# Patient Record
Sex: FEMALE | Race: WHITE | Employment: OTHER | URBAN - METROPOLITAN AREA
[De-identification: names, ages, dates, MRNs, and addresses within clinical notes are randomized per-mention and may not be internally consistent; named-entity substitution may affect disease eponyms.]

---

## 2020-09-04 ENCOUNTER — HOSPITAL ENCOUNTER (OUTPATIENT)
Dept: PREADMISSION TESTING | Age: 70
Discharge: HOME | End: 2020-09-04
Payer: MEDICARE

## 2020-09-04 PROCEDURE — 87635 SARS-COV-2 COVID-19 AMP PRB: CPT

## 2020-09-05 LAB
HEALTH STATUS, XMCV2T: NORMAL
SARS-COV-2, COV2NT: NOT DETECTED
SOURCE, COVRS: NORMAL
SPECIMEN SOURCE, FCOV2M: NORMAL
SPECIMEN TYPE, XMCV1T: NORMAL

## 2020-09-08 ENCOUNTER — HOSPITAL ENCOUNTER (OUTPATIENT)
Age: 70
Setting detail: OUTPATIENT SURGERY
Discharge: HOME | End: 2020-09-08
Attending: COLON & RECTAL SURGERY | Admitting: COLON & RECTAL SURGERY
Payer: MEDICARE

## 2020-09-08 ENCOUNTER — ANESTHESIA (OUTPATIENT)
Dept: ENDOSCOPY | Age: 70
End: 2020-09-08
Payer: MEDICARE

## 2020-09-08 ENCOUNTER — ANESTHESIA EVENT (OUTPATIENT)
Dept: ENDOSCOPY | Age: 70
End: 2020-09-08
Payer: MEDICARE

## 2020-09-08 VITALS
DIASTOLIC BLOOD PRESSURE: 41 MMHG | HEIGHT: 65 IN | OXYGEN SATURATION: 98 % | BODY MASS INDEX: 24.16 KG/M2 | WEIGHT: 145 LBS | TEMPERATURE: 97.9 F | RESPIRATION RATE: 16 BRPM | SYSTOLIC BLOOD PRESSURE: 129 MMHG | HEART RATE: 68 BPM

## 2020-09-08 PROCEDURE — 76060000031 HC ANESTHESIA FIRST 0.5 HR: Performed by: COLON & RECTAL SURGERY

## 2020-09-08 PROCEDURE — 74011250636 HC RX REV CODE- 250/636: Performed by: NURSE ANESTHETIST, CERTIFIED REGISTERED

## 2020-09-08 PROCEDURE — 74011250637 HC RX REV CODE- 250/637: Performed by: COLON & RECTAL SURGERY

## 2020-09-08 PROCEDURE — 76040000019: Performed by: COLON & RECTAL SURGERY

## 2020-09-08 PROCEDURE — 74011250636 HC RX REV CODE- 250/636: Performed by: COLON & RECTAL SURGERY

## 2020-09-08 RX ORDER — FLUMAZENIL 0.1 MG/ML
0.2 INJECTION INTRAVENOUS
Status: DISCONTINUED | OUTPATIENT
Start: 2020-09-08 | End: 2020-09-08 | Stop reason: HOSPADM

## 2020-09-08 RX ORDER — PROPOFOL 10 MG/ML
INJECTION, EMULSION INTRAVENOUS AS NEEDED
Status: DISCONTINUED | OUTPATIENT
Start: 2020-09-08 | End: 2020-09-08 | Stop reason: HOSPADM

## 2020-09-08 RX ORDER — EPINEPHRINE 0.1 MG/ML
1 INJECTION INTRACARDIAC; INTRAVENOUS
Status: DISCONTINUED | OUTPATIENT
Start: 2020-09-08 | End: 2020-09-08 | Stop reason: HOSPADM

## 2020-09-08 RX ORDER — SODIUM CHLORIDE 9 MG/ML
50 INJECTION, SOLUTION INTRAVENOUS CONTINUOUS
Status: DISCONTINUED | OUTPATIENT
Start: 2020-09-08 | End: 2020-09-08 | Stop reason: HOSPADM

## 2020-09-08 RX ORDER — SODIUM CHLORIDE 9 MG/ML
25 INJECTION, SOLUTION INTRAVENOUS CONTINUOUS
Status: DISCONTINUED | OUTPATIENT
Start: 2020-09-08 | End: 2020-09-08 | Stop reason: HOSPADM

## 2020-09-08 RX ORDER — ATROPINE SULFATE 0.1 MG/ML
0.5 INJECTION INTRAVENOUS
Status: DISCONTINUED | OUTPATIENT
Start: 2020-09-08 | End: 2020-09-08 | Stop reason: HOSPADM

## 2020-09-08 RX ORDER — DEXTROMETHORPHAN/PSEUDOEPHED 2.5-7.5/.8
1.2 DROPS ORAL
Status: DISCONTINUED | OUTPATIENT
Start: 2020-09-08 | End: 2020-09-08 | Stop reason: HOSPADM

## 2020-09-08 RX ORDER — NALOXONE HYDROCHLORIDE 0.4 MG/ML
0.4 INJECTION, SOLUTION INTRAMUSCULAR; INTRAVENOUS; SUBCUTANEOUS
Status: DISCONTINUED | OUTPATIENT
Start: 2020-09-08 | End: 2020-09-08 | Stop reason: HOSPADM

## 2020-09-08 RX ORDER — SODIUM CHLORIDE 0.9 % (FLUSH) 0.9 %
5-40 SYRINGE (ML) INJECTION AS NEEDED
Status: DISCONTINUED | OUTPATIENT
Start: 2020-09-08 | End: 2020-09-08 | Stop reason: HOSPADM

## 2020-09-08 RX ORDER — SODIUM CHLORIDE 0.9 % (FLUSH) 0.9 %
5-40 SYRINGE (ML) INJECTION EVERY 8 HOURS
Status: DISCONTINUED | OUTPATIENT
Start: 2020-09-08 | End: 2020-09-08 | Stop reason: HOSPADM

## 2020-09-08 RX ADMIN — SODIUM CHLORIDE 25 ML/HR: 900 INJECTION, SOLUTION INTRAVENOUS at 11:33

## 2020-09-08 RX ADMIN — SIMETHICONE 80 MG: 20 SUSPENSION/ DROPS ORAL at 12:33

## 2020-09-08 RX ADMIN — PROPOFOL 200 MG: 10 INJECTION, EMULSION INTRAVENOUS at 12:39

## 2020-09-08 RX ADMIN — SODIUM CHLORIDE 50 ML/HR: 900 INJECTION, SOLUTION INTRAVENOUS at 12:34

## 2020-09-08 NOTE — OP NOTES
Colonoscopy Procedure Note    Indications: Previous adenomatous polyp, guaiac + stool    Anesthesia/Sedation: MAC anesthesia Propofol    Pre-Procedure Exam:  Airway: clear   Heart: normal S1and S2    Lungs: clear bilateral  Abdomen: soft, nontender, bowel sounds present and normal in all quadrants   Mental Status: awake, alert, and oriented to person, place, and time      Procedure in Detail:  Informed consent was obtained for the procedure, including sedation. Risks of perforation, hemorrhage, adverse drug reaction, and aspiration were discussed. The patient was placed in the left lateral decubitus position. Based on the pre-procedure assessment, including review of the patient's medical history, medications, allergies, and review of systems, she had been deemed to be an appropriate candidate for moderate sedation; she was therefore sedated with the medications listed above. The patient was monitored continuously with ECG tracing, pulse oximetry, blood pressure monitoring, and direct observations. A rectal examination was performed. The [id number] was inserted into the rectum and advanced under direct vision to the cecum, which was identified by the ileocecal valve and appendiceal orifice. The quality of the colonic preparation was excellent. A careful inspection was made as the colonoscope was withdrawn, including a retroflexed view of the rectum; findings and interventions are described below. Appropriate photodocumentation was obtained. Findings:   Rectum:     - Protruding lesions:     -Internal Hemorrhoids  Sigmoid:     - normal colorectal anastomosis  Descending Colon:   Normal  Transverse Colon:   Normal  Ascending Colon:   Normal  Cecum:   Normal          Specimens: No specimens were collected. EBL: None    Complications: None; patient tolerated the procedure well. Attending Attestation: I performed the procedure. Recommendations:   - Repeat colonoscopy in 5 years.

## 2020-09-08 NOTE — PERIOP NOTES
Sabiha Gomez  1950  [id number]    Situation:  Verbal report received from: Karla Pinon RN  Procedure: Procedure(s):  COLONOSCOPY    Background:    Preoperative diagnosis: BLOOD IN STOOLS  Postoperative diagnosis: DIVERTICULOSIS, HEMORRHOIDS    :  Dr. Suárez Melvin  Assistant(s): Endoscopy Technician-1: Jennifer Campo  Endoscopy RN-1: Ruben Denis RN    Specimens: * No specimens in log *  H. Pylori  no    Assessment:  Intra-procedure medications   Anesthesia gave intra-procedure sedation and medications, see anesthesia flow sheet yes    Intravenous fluids: NS@ KVO     Vital signs stable   Abdominal assessment: round and soft     Recommendation:  Discharge patient per MD order.   Family or Friend   Permission to share finding with family or friend yes

## 2020-09-08 NOTE — DISCHARGE INSTRUCTIONS
Lisbet Hansen  [id number]  1950    COLON DISCHARGE INSTRUCTIONS  Discomfort:  Redness at IV site- apply warm compress to area; if redness or soreness persist- contact your physician  There may be a slight amount of blood passed from the rectum  Gaseous discomfort- walking, belching will help relieve any discomfort  You may not operate a vehicle for 12 hours  You may not engage in an occupation involving machinery or appliances for rest of today  You may not drink alcoholic beverages for at least 12 hours  Avoid making any critical decisions for at least 24 hour  DIET:   High fiber diet. - however -  remember your colon is empty and a heavy meal will produce gas. Avoid these foods:  vegetables, fried / greasy foods, carbonated drinks for today    MEDICATIONS:  resume       ACTIVITY:  You may resume your normal daily activities it is recommended that you spend the remainder of the day resting -  avoid any strenuous activity. CALL M.D. ANY SIGN OF:   Increasing pain, nausea, vomiting  Abdominal distension (swelling)  New increased bleeding (oral or rectal)  Fever (chills)  Pain in chest area  Bloody discharge from nose or mouth  Shortness of breath     Follow-up Instructions:   Call Rochelle Colorado MD if any questions or problems. Telephone # [phone]  Biopsy results will be available in  7 to10 days  Should have a repeat colonoscopy in 5 years. COLONOSCOPY FINDINGS:  Your colonoscopy showed: hemorrhoids, sigmoid diverticulosis.

## 2020-09-08 NOTE — ANESTHESIA PREPROCEDURE EVALUATION
Relevant Problems   No relevant active problems       Anesthetic History     PONV          Review of Systems / Medical History  Patient summary reviewed and pertinent labs reviewed    Pulmonary  Within defined limits                 Neuro/Psych   Within defined limits           Cardiovascular    Hypertension              Exercise tolerance: >4 METS     GI/Hepatic/Renal  Within defined limits              Endo/Other        Arthritis     Other Findings   Comments: Blood in stools    H/O polyps            Physical Exam    Airway  Mallampati: II  TM Distance: 4 - 6 cm  Neck ROM: normal range of motion   Mouth opening: Normal     Cardiovascular  Regular rate and rhythm,  S1 and S2 normal,  no murmur, click, rub, or gallop             Dental  No notable dental hx       Pulmonary  Breath sounds clear to auscultation               Abdominal  GI exam deferred       Other Findings            Anesthetic Plan    ASA: 2  Anesthesia type: MAC and total IV anesthesia          Induction: Intravenous  Anesthetic plan and risks discussed with: Patient

## 2020-09-08 NOTE — ANESTHESIA POSTPROCEDURE EVALUATION
Procedure(s):  COLONOSCOPY.    MAC, total IV anesthesia    Anesthesia Post Evaluation        Patient location during evaluation: PACU  Note status: Adequate. Level of consciousness: responsive to verbal stimuli and sleepy but conscious  Pain management: satisfactory to patient  Airway patency: patent  Anesthetic complications: no  Cardiovascular status: acceptable  Respiratory status: acceptable  Hydration status: acceptable  Comments: +Post-Anesthesia Evaluation and Assessment    Patient: Lisebt Hansen MRN: [id number]  SSN: [id number]   YOB: 1950  Age: 69 y.o. Sex: female      Cardiovascular Function/Vital Signs    /41   Pulse 68   Temp 36.6 °C (97.9 °F)   Resp 16   Ht 5' 5\" (1.651 m)   Wt 65.8 kg (145 lb)   SpO2 98%   Breastfeeding No   BMI 24.13 kg/m²     Patient is status post Procedure(s):  COLONOSCOPY. Nausea/Vomiting: Controlled. Postoperative hydration reviewed and adequate. Pain:  Pain Scale 1: Numeric (0 - 10) (09/08/20 1309)  Pain Intensity 1: 0 (09/08/20 1309)   Managed. Neurological Status: At baseline. Mental Status and Level of Consciousness: Arousable. Pulmonary Status:   O2 Device: Room air (09/08/20 1309)   Adequate oxygenation and airway patent. Complications related to anesthesia: None    Post-anesthesia assessment completed. No concerns. Signed By: Fernanda Breen MD    9/8/2020  Post anesthesia nausea and vomiting:  controlled      INITIAL Post-op Vital signs:   Vitals Value Taken Time   /40 9/8/2020  1:09 PM   Temp 36.6 °C (97.9 °F) 9/8/2020 12:49 PM   Pulse 65 9/8/2020  1:09 PM   Resp 15 9/8/2020  1:09 PM   SpO2 100 % 9/8/2020  1:09 PM   Vitals shown include unvalidated device data.

## 2021-08-11 ENCOUNTER — TRANSCRIBE ORDER (OUTPATIENT)
Dept: SCHEDULING | Age: 71
End: 2021-08-11

## 2021-08-11 DIAGNOSIS — R94.4 NONSPECIFIC ABNORMAL RESULTS OF KIDNEY FUNCTION STUDY: Primary | ICD-10-CM

## 2021-08-13 ENCOUNTER — HOSPITAL ENCOUNTER (OUTPATIENT)
Dept: ULTRASOUND IMAGING | Age: 71
Discharge: HOME | End: 2021-08-13
Attending: HOSPITALIST
Payer: MEDICARE

## 2021-08-13 DIAGNOSIS — R94.4 NONSPECIFIC ABNORMAL RESULTS OF KIDNEY FUNCTION STUDY: ICD-10-CM

## 2021-08-13 PROCEDURE — 76770 US EXAM ABDO BACK WALL COMP: CPT

## 2022-02-19 ENCOUNTER — HOSPITAL ENCOUNTER (INPATIENT)
Age: 72
LOS: 4 days | Discharge: HOME | DRG: 242 | End: 2022-02-23
Attending: EMERGENCY MEDICINE | Admitting: INTERNAL MEDICINE
Payer: MEDICARE

## 2022-02-19 ENCOUNTER — APPOINTMENT (OUTPATIENT)
Dept: GENERAL RADIOLOGY | Age: 72
DRG: 242 | End: 2022-02-19
Attending: EMERGENCY MEDICINE
Payer: MEDICARE

## 2022-02-19 DIAGNOSIS — I21.4 NON-STEMI (NON-ST ELEVATED MYOCARDIAL INFARCTION) (HCC): ICD-10-CM

## 2022-02-19 DIAGNOSIS — I21.4 NSTEMI (NON-ST ELEVATED MYOCARDIAL INFARCTION) (HCC): ICD-10-CM

## 2022-02-19 DIAGNOSIS — I45.9 HEART BLOCK: ICD-10-CM

## 2022-02-19 DIAGNOSIS — I44.1 SECOND DEGREE HEART BLOCK: Primary | ICD-10-CM

## 2022-02-19 DIAGNOSIS — I50.9 ACUTE CONGESTIVE HEART FAILURE, UNSPECIFIED HEART FAILURE TYPE (HCC): ICD-10-CM

## 2022-02-19 LAB
ABO + RH BLD: NORMAL
ACT BLD: 232 SECS (ref 79–138)
ACT BLD: 279 SECS (ref 79–138)
ACT BLD: 279 SECS (ref 79–138)
ALBUMIN SERPL-MCNC: 3.5 G/DL (ref 3.5–5)
ALBUMIN/GLOB SERPL: 0.8 {RATIO} (ref 1.1–2.2)
ALP SERPL-CCNC: 141 U/L (ref 45–117)
ALT SERPL-CCNC: 46 U/L (ref 12–78)
ANION GAP SERPL CALC-SCNC: 9 MMOL/L (ref 5–15)
APTT PPP: >130 SEC (ref 22.1–31)
AST SERPL-CCNC: 31 U/L (ref 15–37)
BASOPHILS # BLD: 0 K/UL (ref 0–0.1)
BASOPHILS # BLD: 0.1 K/UL (ref 0–0.1)
BASOPHILS NFR BLD: 0 % (ref 0–1)
BASOPHILS NFR BLD: 0 % (ref 0–1)
BILIRUB SERPL-MCNC: 0.5 MG/DL (ref 0.2–1)
BLOOD GROUP ANTIBODIES SERPL: NORMAL
BNP SERPL-MCNC: 4264 PG/ML
BUN SERPL-MCNC: 32 MG/DL (ref 6–20)
BUN/CREAT SERPL: 28 (ref 12–20)
CALCIUM SERPL-MCNC: 9 MG/DL (ref 8.5–10.1)
CHLORIDE SERPL-SCNC: 108 MMOL/L (ref 97–108)
CO2 SERPL-SCNC: 20 MMOL/L (ref 21–32)
COMMENT, HOLDF: NORMAL
COVID-19 RAPID TEST, COVR: NOT DETECTED
CREAT SERPL-MCNC: 1.13 MG/DL (ref 0.55–1.02)
DIFFERENTIAL METHOD BLD: ABNORMAL
DIFFERENTIAL METHOD BLD: ABNORMAL
EOSINOPHIL # BLD: 0.1 K/UL (ref 0–0.4)
EOSINOPHIL # BLD: 0.1 K/UL (ref 0–0.4)
EOSINOPHIL NFR BLD: 1 % (ref 0–7)
EOSINOPHIL NFR BLD: 1 % (ref 0–7)
ERYTHROCYTE [DISTWIDTH] IN BLOOD BY AUTOMATED COUNT: 13.4 % (ref 11.5–14.5)
ERYTHROCYTE [DISTWIDTH] IN BLOOD BY AUTOMATED COUNT: 13.7 % (ref 11.5–14.5)
GLOBULIN SER CALC-MCNC: 4.4 G/DL (ref 2–4)
GLUCOSE SERPL-MCNC: 123 MG/DL (ref 65–100)
HCT VFR BLD AUTO: 33.1 % (ref 35–47)
HCT VFR BLD AUTO: 34 % (ref 35–47)
HGB BLD-MCNC: 10.6 G/DL (ref 11.5–16)
HGB BLD-MCNC: 11 G/DL (ref 11.5–16)
IMM GRANULOCYTES # BLD AUTO: 0.1 K/UL (ref 0–0.04)
IMM GRANULOCYTES # BLD AUTO: 0.1 K/UL (ref 0–0.04)
IMM GRANULOCYTES NFR BLD AUTO: 0 % (ref 0–0.5)
IMM GRANULOCYTES NFR BLD AUTO: 1 % (ref 0–0.5)
LYMPHOCYTES # BLD: 1.4 K/UL (ref 0.8–3.5)
LYMPHOCYTES # BLD: 1.7 K/UL (ref 0.8–3.5)
LYMPHOCYTES NFR BLD: 12 % (ref 12–49)
LYMPHOCYTES NFR BLD: 13 % (ref 12–49)
MCH RBC QN AUTO: 26.6 PG (ref 26–34)
MCH RBC QN AUTO: 26.9 PG (ref 26–34)
MCHC RBC AUTO-ENTMCNC: 32 G/DL (ref 30–36.5)
MCHC RBC AUTO-ENTMCNC: 32.4 G/DL (ref 30–36.5)
MCV RBC AUTO: 83.1 FL (ref 80–99)
MCV RBC AUTO: 83.2 FL (ref 80–99)
MONOCYTES # BLD: 0.9 K/UL (ref 0–1)
MONOCYTES # BLD: 0.9 K/UL (ref 0–1)
MONOCYTES NFR BLD: 7 % (ref 5–13)
MONOCYTES NFR BLD: 7 % (ref 5–13)
NEUTS SEG # BLD: 9.6 K/UL (ref 1.8–8)
NEUTS SEG # BLD: 9.7 K/UL (ref 1.8–8)
NEUTS SEG NFR BLD: 79 % (ref 32–75)
NEUTS SEG NFR BLD: 79 % (ref 32–75)
NRBC # BLD: 0 K/UL (ref 0–0.01)
NRBC # BLD: 0 K/UL (ref 0–0.01)
NRBC BLD-RTO: 0 PER 100 WBC
NRBC BLD-RTO: 0 PER 100 WBC
PLATELET # BLD AUTO: 331 K/UL (ref 150–400)
PLATELET # BLD AUTO: 350 K/UL (ref 150–400)
PMV BLD AUTO: 9.3 FL (ref 8.9–12.9)
PMV BLD AUTO: 9.4 FL (ref 8.9–12.9)
POTASSIUM SERPL-SCNC: 4.6 MMOL/L (ref 3.5–5.1)
PROT SERPL-MCNC: 7.9 G/DL (ref 6.4–8.2)
RBC # BLD AUTO: 3.98 M/UL (ref 3.8–5.2)
RBC # BLD AUTO: 4.09 M/UL (ref 3.8–5.2)
SAMPLES BEING HELD,HOLD: NORMAL
SODIUM SERPL-SCNC: 137 MMOL/L (ref 136–145)
SOURCE, COVRS: NORMAL
SPECIMEN EXP DATE BLD: NORMAL
THERAPEUTIC RANGE,PTTT: ABNORMAL SECS (ref 58–77)
TROPONIN-HIGH SENSITIVITY: 2683 NG/L (ref 0–51)
WBC # BLD AUTO: 12.2 K/UL (ref 3.6–11)
WBC # BLD AUTO: 12.5 K/UL (ref 3.6–11)

## 2022-02-19 PROCEDURE — 74011250637 HC RX REV CODE- 250/637: Performed by: STUDENT IN AN ORGANIZED HEALTH CARE EDUCATION/TRAINING PROGRAM

## 2022-02-19 PROCEDURE — 77030019698 HC SYR ANGI MDLON MRTM -A: Performed by: STUDENT IN AN ORGANIZED HEALTH CARE EDUCATION/TRAINING PROGRAM

## 2022-02-19 PROCEDURE — B2151ZZ FLUOROSCOPY OF LEFT HEART USING LOW OSMOLAR CONTRAST: ICD-10-PCS | Performed by: STUDENT IN AN ORGANIZED HEALTH CARE EDUCATION/TRAINING PROGRAM

## 2022-02-19 PROCEDURE — 84484 ASSAY OF TROPONIN QUANT: CPT

## 2022-02-19 PROCEDURE — 93005 ELECTROCARDIOGRAM TRACING: CPT

## 2022-02-19 PROCEDURE — 80053 COMPREHEN METABOLIC PANEL: CPT

## 2022-02-19 PROCEDURE — 94761 N-INVAS EAR/PLS OXIMETRY MLT: CPT

## 2022-02-19 PROCEDURE — 86900 BLOOD TYPING SEROLOGIC ABO: CPT

## 2022-02-19 PROCEDURE — C1725 CATH, TRANSLUMIN NON-LASER: HCPCS | Performed by: STUDENT IN AN ORGANIZED HEALTH CARE EDUCATION/TRAINING PROGRAM

## 2022-02-19 PROCEDURE — 85730 THROMBOPLASTIN TIME PARTIAL: CPT

## 2022-02-19 PROCEDURE — 77030015766: Performed by: STUDENT IN AN ORGANIZED HEALTH CARE EDUCATION/TRAINING PROGRAM

## 2022-02-19 PROCEDURE — 33210 INSERT ELECTRD/PM CATH SNGL: CPT | Performed by: STUDENT IN AN ORGANIZED HEALTH CARE EDUCATION/TRAINING PROGRAM

## 2022-02-19 PROCEDURE — 74011000250 HC RX REV CODE- 250: Performed by: STUDENT IN AN ORGANIZED HEALTH CARE EDUCATION/TRAINING PROGRAM

## 2022-02-19 PROCEDURE — 74011000636 HC RX REV CODE- 636: Performed by: STUDENT IN AN ORGANIZED HEALTH CARE EDUCATION/TRAINING PROGRAM

## 2022-02-19 PROCEDURE — C1894 INTRO/SHEATH, NON-LASER: HCPCS | Performed by: STUDENT IN AN ORGANIZED HEALTH CARE EDUCATION/TRAINING PROGRAM

## 2022-02-19 PROCEDURE — 85347 COAGULATION TIME ACTIVATED: CPT

## 2022-02-19 PROCEDURE — 92928 PRQ TCAT PLMT NTRAC ST 1 LES: CPT | Performed by: STUDENT IN AN ORGANIZED HEALTH CARE EDUCATION/TRAINING PROGRAM

## 2022-02-19 PROCEDURE — 74011250637 HC RX REV CODE- 250/637: Performed by: EMERGENCY MEDICINE

## 2022-02-19 PROCEDURE — B2111ZZ FLUOROSCOPY OF MULTIPLE CORONARY ARTERIES USING LOW OSMOLAR CONTRAST: ICD-10-PCS | Performed by: STUDENT IN AN ORGANIZED HEALTH CARE EDUCATION/TRAINING PROGRAM

## 2022-02-19 PROCEDURE — C1887 CATHETER, GUIDING: HCPCS | Performed by: STUDENT IN AN ORGANIZED HEALTH CARE EDUCATION/TRAINING PROGRAM

## 2022-02-19 PROCEDURE — 77030010221 HC SPLNT WR POS TELE -B: Performed by: STUDENT IN AN ORGANIZED HEALTH CARE EDUCATION/TRAINING PROGRAM

## 2022-02-19 PROCEDURE — 99153 MOD SED SAME PHYS/QHP EA: CPT | Performed by: STUDENT IN AN ORGANIZED HEALTH CARE EDUCATION/TRAINING PROGRAM

## 2022-02-19 PROCEDURE — 93458 L HRT ARTERY/VENTRICLE ANGIO: CPT | Performed by: STUDENT IN AN ORGANIZED HEALTH CARE EDUCATION/TRAINING PROGRAM

## 2022-02-19 PROCEDURE — C1769 GUIDE WIRE: HCPCS | Performed by: STUDENT IN AN ORGANIZED HEALTH CARE EDUCATION/TRAINING PROGRAM

## 2022-02-19 PROCEDURE — 87635 SARS-COV-2 COVID-19 AMP PRB: CPT

## 2022-02-19 PROCEDURE — 71045 X-RAY EXAM CHEST 1 VIEW: CPT

## 2022-02-19 PROCEDURE — 85025 COMPLETE CBC W/AUTO DIFF WBC: CPT

## 2022-02-19 PROCEDURE — 99285 EMERGENCY DEPT VISIT HI MDM: CPT

## 2022-02-19 PROCEDURE — 77030008543 HC TBNG MON PRSS MRTM -A: Performed by: STUDENT IN AN ORGANIZED HEALTH CARE EDUCATION/TRAINING PROGRAM

## 2022-02-19 PROCEDURE — 77030042317 HC BND COMPR HEMSTAT -B: Performed by: STUDENT IN AN ORGANIZED HEALTH CARE EDUCATION/TRAINING PROGRAM

## 2022-02-19 PROCEDURE — 99152 MOD SED SAME PHYS/QHP 5/>YRS: CPT | Performed by: STUDENT IN AN ORGANIZED HEALTH CARE EDUCATION/TRAINING PROGRAM

## 2022-02-19 PROCEDURE — 77030002996 HC SUT SLK J&J -A: Performed by: STUDENT IN AN ORGANIZED HEALTH CARE EDUCATION/TRAINING PROGRAM

## 2022-02-19 PROCEDURE — 77030005320 HC CATH PACE TEMP STJU -B: Performed by: STUDENT IN AN ORGANIZED HEALTH CARE EDUCATION/TRAINING PROGRAM

## 2022-02-19 PROCEDURE — 65620000000 HC RM CCU GENERAL

## 2022-02-19 PROCEDURE — 77030040934 HC CATH DIAG DXTERITY MEDT -A: Performed by: STUDENT IN AN ORGANIZED HEALTH CARE EDUCATION/TRAINING PROGRAM

## 2022-02-19 PROCEDURE — 96365 THER/PROPH/DIAG IV INF INIT: CPT

## 2022-02-19 PROCEDURE — 74011250636 HC RX REV CODE- 250/636: Performed by: EMERGENCY MEDICINE

## 2022-02-19 PROCEDURE — 74011250636 HC RX REV CODE- 250/636: Performed by: STUDENT IN AN ORGANIZED HEALTH CARE EDUCATION/TRAINING PROGRAM

## 2022-02-19 PROCEDURE — 96375 TX/PRO/DX INJ NEW DRUG ADDON: CPT

## 2022-02-19 PROCEDURE — 77030012468 HC VLV BLEEDBK CNTRL ABBT -B: Performed by: STUDENT IN AN ORGANIZED HEALTH CARE EDUCATION/TRAINING PROGRAM

## 2022-02-19 PROCEDURE — 36415 COLL VENOUS BLD VENIPUNCTURE: CPT

## 2022-02-19 PROCEDURE — 83880 ASSAY OF NATRIURETIC PEPTIDE: CPT

## 2022-02-19 PROCEDURE — 027034Z DILATION OF CORONARY ARTERY, ONE ARTERY WITH DRUG-ELUTING INTRALUMINAL DEVICE, PERCUTANEOUS APPROACH: ICD-10-PCS | Performed by: STUDENT IN AN ORGANIZED HEALTH CARE EDUCATION/TRAINING PROGRAM

## 2022-02-19 PROCEDURE — C1874 STENT, COATED/COV W/DEL SYS: HCPCS | Performed by: STUDENT IN AN ORGANIZED HEALTH CARE EDUCATION/TRAINING PROGRAM

## 2022-02-19 PROCEDURE — 4A023N7 MEASUREMENT OF CARDIAC SAMPLING AND PRESSURE, LEFT HEART, PERCUTANEOUS APPROACH: ICD-10-PCS | Performed by: STUDENT IN AN ORGANIZED HEALTH CARE EDUCATION/TRAINING PROGRAM

## 2022-02-19 DEVICE — DISPOSABLE ADAPTER: Type: IMPLANTABLE DEVICE | Status: FUNCTIONAL

## 2022-02-19 DEVICE — BIPOLAR PACING CATHETER
Type: IMPLANTABLE DEVICE | Status: FUNCTIONAL
Brand: PACEL™

## 2022-02-19 DEVICE — STENT RONYX25015UX RESOLUTE ONYX 2.50X15
Type: IMPLANTABLE DEVICE | Status: FUNCTIONAL
Brand: RESOLUTE ONYX™

## 2022-02-19 RX ORDER — FUROSEMIDE 10 MG/ML
40 INJECTION INTRAMUSCULAR; INTRAVENOUS
Status: COMPLETED | OUTPATIENT
Start: 2022-02-19 | End: 2022-02-19

## 2022-02-19 RX ORDER — FUROSEMIDE 10 MG/ML
40 INJECTION INTRAMUSCULAR; INTRAVENOUS DAILY
Status: DISCONTINUED | OUTPATIENT
Start: 2022-02-20 | End: 2022-02-20

## 2022-02-19 RX ORDER — HEPARIN SODIUM 200 [USP'U]/100ML
INJECTION, SOLUTION INTRAVENOUS
Status: COMPLETED | OUTPATIENT
Start: 2022-02-19 | End: 2022-02-19

## 2022-02-19 RX ORDER — HEPARIN SODIUM 1000 [USP'U]/ML
2000 INJECTION, SOLUTION INTRAVENOUS; SUBCUTANEOUS AS NEEDED
Status: DISCONTINUED | OUTPATIENT
Start: 2022-02-20 | End: 2022-02-19

## 2022-02-19 RX ORDER — MIDAZOLAM HYDROCHLORIDE 1 MG/ML
INJECTION, SOLUTION INTRAMUSCULAR; INTRAVENOUS AS NEEDED
Status: DISCONTINUED | OUTPATIENT
Start: 2022-02-19 | End: 2022-02-19 | Stop reason: HOSPADM

## 2022-02-19 RX ORDER — LOSARTAN POTASSIUM 50 MG/1
50 TABLET ORAL DAILY
Status: DISCONTINUED | OUTPATIENT
Start: 2022-02-20 | End: 2022-02-21

## 2022-02-19 RX ORDER — GUAIFENESIN 100 MG/5ML
81 LIQUID (ML) ORAL DAILY
Status: DISCONTINUED | OUTPATIENT
Start: 2022-02-20 | End: 2022-02-23 | Stop reason: HOSPADM

## 2022-02-19 RX ORDER — FENTANYL CITRATE 50 UG/ML
INJECTION, SOLUTION INTRAMUSCULAR; INTRAVENOUS AS NEEDED
Status: DISCONTINUED | OUTPATIENT
Start: 2022-02-19 | End: 2022-02-19 | Stop reason: HOSPADM

## 2022-02-19 RX ORDER — HEPARIN SODIUM 10000 [USP'U]/100ML
12-25 INJECTION, SOLUTION INTRAVENOUS
Status: DISCONTINUED | OUTPATIENT
Start: 2022-02-19 | End: 2022-02-19

## 2022-02-19 RX ORDER — ROSUVASTATIN CALCIUM 20 MG/1
20 TABLET, COATED ORAL
Status: DISCONTINUED | OUTPATIENT
Start: 2022-02-19 | End: 2022-02-23 | Stop reason: HOSPADM

## 2022-02-19 RX ORDER — HEPARIN SODIUM 1000 [USP'U]/ML
INJECTION, SOLUTION INTRAVENOUS; SUBCUTANEOUS AS NEEDED
Status: DISCONTINUED | OUTPATIENT
Start: 2022-02-19 | End: 2022-02-19 | Stop reason: HOSPADM

## 2022-02-19 RX ORDER — HEPARIN SODIUM 1000 [USP'U]/ML
4000 INJECTION, SOLUTION INTRAVENOUS; SUBCUTANEOUS ONCE
Status: COMPLETED | OUTPATIENT
Start: 2022-02-19 | End: 2022-02-19

## 2022-02-19 RX ORDER — LIDOCAINE HYDROCHLORIDE 10 MG/ML
INJECTION INFILTRATION; PERINEURAL AS NEEDED
Status: DISCONTINUED | OUTPATIENT
Start: 2022-02-19 | End: 2022-02-19 | Stop reason: HOSPADM

## 2022-02-19 RX ORDER — GUAIFENESIN 100 MG/5ML
325 LIQUID (ML) ORAL
Status: COMPLETED | OUTPATIENT
Start: 2022-02-19 | End: 2022-02-19

## 2022-02-19 RX ORDER — VERAPAMIL HYDROCHLORIDE 2.5 MG/ML
INJECTION, SOLUTION INTRAVENOUS AS NEEDED
Status: DISCONTINUED | OUTPATIENT
Start: 2022-02-19 | End: 2022-02-19 | Stop reason: HOSPADM

## 2022-02-19 RX ORDER — HEPARIN SODIUM 1000 [USP'U]/ML
4000 INJECTION, SOLUTION INTRAVENOUS; SUBCUTANEOUS AS NEEDED
Status: DISCONTINUED | OUTPATIENT
Start: 2022-02-20 | End: 2022-02-19

## 2022-02-19 RX ADMIN — FUROSEMIDE 40 MG: 10 INJECTION, SOLUTION INTRAMUSCULAR; INTRAVENOUS at 19:19

## 2022-02-19 RX ADMIN — NITROGLYCERIN 1 INCH: 20 OINTMENT TOPICAL at 19:20

## 2022-02-19 RX ADMIN — HEPARIN SODIUM AND DEXTROSE 12 UNITS/KG/HR: 10000; 5 INJECTION INTRAVENOUS at 19:35

## 2022-02-19 RX ADMIN — ASPIRIN 324 MG: 81 TABLET, CHEWABLE ORAL at 19:16

## 2022-02-19 RX ADMIN — ROSUVASTATIN 20 MG: 20 TABLET, FILM COATED ORAL at 23:01

## 2022-02-19 RX ADMIN — HEPARIN SODIUM 4000 UNITS: 1000 INJECTION INTRAVENOUS; SUBCUTANEOUS at 19:30

## 2022-02-19 NOTE — Clinical Note
Temporary pacemaker inserted. Inserted through the right internal jugular. Temporary Pacer pacing in the right ventricle. Device secured using: tegaderm and suture.

## 2022-02-19 NOTE — Clinical Note
Left subclavian vein. Accessed successfully. Micropuncture needle used. Using fluoro guidance.  Access x2

## 2022-02-19 NOTE — ED PROVIDER NOTES
EMERGENCY DEPARTMENT HISTORY AND PHYSICAL EXAM      Date: 2/19/2022  Patient Name: Maurice Bell  Patient Age and Sex: 66 y.o. female     History of Presenting Illness     Chief Complaint   Patient presents with    Shortness of Breath     Reports worsening SOB x one week. Occasional left sided chest pain. Reports she has recently switched HTN meds. Denied hx of COPD or Asthma. History Provided By: Patient    HPI: Maurice Bell is a 19-year-old female with a history of IBS, hypertension presenting for shortness of breath. Patient states that they switched her high blood pressure medications in June and since then has been having some episodes of dizziness. In January started to measure her blood pressures and heart rates and noticed that her heart rate was as low as the 30s. PCP referred her to Cards. She has an appointment with Dr. Jessica Mojica in March. Then in the past 1 week has been having worsening dizziness and lightheadedness in addition to shortness of breath, fatigue. Has been having intermittent chest pain and had some chest pain around 4 PM when she first came to the ER but it is since resolved. Denies any nausea, vomiting, diarrhea but has been having some nausea.  states that patient does appear paler. Patient not on any blood thinners and she has noticed any blood in her stool with no melena. .     There are no other complaints, changes, or physical findings at this time. PCP: Daniel Frye MD    No current facility-administered medications on file prior to encounter. Current Outpatient Medications on File Prior to Encounter   Medication Sig Dispense Refill    OTHER Morphine 240mg / Aquaphon 160 mg  Ointment for right knee      AZELASTINE/FLUTICASONE (DYMISTA NA) by Nasal route. As needed      lidocaine (LIDODERM) 5 %(700 mg/patch) 1 Patch by TransDERmal route every twenty-four (24) hours.  As needed for pain      lisinopril-hydrochlorothiazide (PRINZIDE, ZESTORETIC) 20-12.5 mg per tablet Take 1 Tab by mouth every morning. Past History     Past Medical History:  Past Medical History:   Diagnosis Date    Arthritis     hands,  left knee    Colon polyps     Hypertension     IBS (irritable bowel syndrome)     colostomy  revresed    Nausea & vomiting     anesthesia related    Other ill-defined conditions(799.89)     seasonal allergies       Past Surgical History:  Past Surgical History:   Procedure Laterality Date    ABDOMEN SURGERY PROC UNLISTED  3/10    sigmoid colectomy (colostomy)-reversed    COLONOSCOPY N/A 9/8/2020    COL[facility] END[facility]COPY    HX APPENDECTOMY      HX CHOLECYSTECTOMY  09/06/13    Laparoscopic Cholecystectomty @ [facility]    HX DILATION AND CURETTAGE      HX HYSTERECTOMY      RSO    HX MOHS PROCEDURES      right    HX ORTHOPAEDIC  3457-3031    \"removal right kneecap\"--\"infection\"    HX ORTHOPAEDIC      right knee bursa    HX OTHER SURGICAL  2010    colonoscopy-polyps --perforated sigmoid     HX PELVIC LAPAROSCOPY      HX TONSILLECTOMY         Family History:  Family History   Problem Relation Age of Onset    Heart Failure Mother     Heart Attack Father     Diabetes Father     Heart Failure Brother        Social History:  Social History     Tobacco Use    Smoking status: Never Smoker    Smokeless tobacco: Never Used   Substance Use Topics    Alcohol use: No    Drug use: No     Types: Prescription, OTC       Allergies:   Allergies   Allergen Reactions    Adhesive Tape Rash     Severe rash if adhesive on for more than one day    Scopolamine Other (comments)     Had scopolamine patch when removed area looked burned and skin red and irritated    Codeine Nausea and Vomiting    Sulfa (Sulfonamide Antibiotics) Nausea and Vomiting    Azithromycin Rash    Ceclor [Cefaclor] Nausea and Vomiting    Trazodone Other (comments)     dizzy    Zolpidem Other (comments)     dizzy Review of Systems   Review of Systems   Constitutional: Positive for fatigue. Negative for chills and fever. Respiratory: Positive for cough and shortness of breath. Cardiovascular: Negative for chest pain. Gastrointestinal: Positive for nausea. Negative for abdominal pain, constipation, diarrhea and vomiting. Genitourinary: Negative for dysuria, frequency and hematuria. Neurological: Positive for light-headedness. Negative for weakness and numbness. All other systems reviewed and are negative. Physical Exam   Physical Exam  Vitals and nursing note reviewed. Constitutional:       Appearance: She is well-developed. HENT:      Head: Normocephalic and atraumatic. Nose: Nose normal.      Mouth/Throat:      Mouth: Mucous membranes are moist.   Eyes:      Extraocular Movements: Extraocular movements intact. Conjunctiva/sclera: Conjunctivae normal.   Cardiovascular:      Rate and Rhythm: Regular rhythm. Bradycardia present. Pulmonary:      Effort: Pulmonary effort is normal. No respiratory distress. Breath sounds: Normal breath sounds. Comments: Patient ambulated from triage all the way to room 9 and while she was able to ambulate on her own, was tachypneic afterwards. Abdominal:      General: There is no distension. Palpations: Abdomen is soft. Tenderness: There is no abdominal tenderness. Musculoskeletal:         General: Normal range of motion. Cervical back: Normal range of motion and neck supple. Skin:     General: Skin is warm and dry. Coloration: Skin is pale. Neurological:      General: No focal deficit present. Mental Status: She is alert and oriented to person, place, and time. Mental status is at baseline.    Psychiatric:         Mood and Affect: Mood normal.          Diagnostic Study Results     Labs -     Recent Results (from the past 12 hour(s))   EKG, 12 LEAD, INITIAL    Collection Time: 02/19/22  4:44 PM   Result Value Ref Range    Ventricular Rate 70 BPM    Atrial Rate 70 BPM    P-R Interval 178 ms    QRS Duration 126 ms    Q-T Interval 444 ms    QTC Calculation (Bezet) 479 ms    Calculated P Axis 68 degrees    Calculated R Axis 31 degrees    Calculated T Axis 56 degrees    Diagnosis       Sinus rhythm with marked sinus arrhythmia  Possible Left atrial enlargement  Left bundle branch block  When compared with ECG of 30-AUG-2013 09:50,  Left bundle branch block is now present     CBC WITH AUTOMATED DIFF    Collection Time: 02/19/22  6:03 PM   Result Value Ref Range    WBC 12.2 (H) 3.6 - 11.0 K/uL    RBC 3.98 3.80 - 5.20 M/uL    HGB 10.6 (L) 11.5 - 16.0 g/dL    HCT 33.1 (L) 35.0 - 47.0 %    MCV 83.2 80.0 - 99.0 FL    MCH 26.6 26.0 - 34.0 PG    MCHC 32.0 30.0 - 36.5 g/dL    RDW 13.7 11.5 - 14.5 %    PLATELET 764 521 - 715 K/uL    MPV 9.4 8.9 - 12.9 FL    NRBC 0.0 0  WBC    ABSOLUTE NRBC 0.00 0.00 - 0.01 K/uL    NEUTROPHILS 79 (H) 32 - 75 %    LYMPHOCYTES 12 12 - 49 %    MONOCYTES 7 5 - 13 %    EOSINOPHILS 1 0 - 7 %    BASOPHILS 0 0 - 1 %    IMMATURE GRANULOCYTES 1 (H) 0.0 - 0.5 %    ABS. NEUTROPHILS 9.6 (H) 1.8 - 8.0 K/UL    ABS. LYMPHOCYTES 1.4 0.8 - 3.5 K/UL    ABS. MONOCYTES 0.9 0.0 - 1.0 K/UL    ABS. EOSINOPHILS 0.1 0.0 - 0.4 K/UL    ABS. BASOPHILS 0.0 0.0 - 0.1 K/UL    ABS. IMM.  GRANS. 0.1 (H) 0.00 - 0.04 K/UL    DF AUTOMATED     METABOLIC PANEL, COMPREHENSIVE    Collection Time: 02/19/22  6:03 PM   Result Value Ref Range    Sodium 137 136 - 145 mmol/L    Potassium 4.6 3.5 - 5.1 mmol/L    Chloride 108 97 - 108 mmol/L    CO2 20 (L) 21 - 32 mmol/L    Anion gap 9 5 - 15 mmol/L    Glucose 123 (H) 65 - 100 mg/dL    BUN 32 (H) 6 - 20 MG/DL    Creatinine 1.13 (H) 0.55 - 1.02 MG/DL    BUN/Creatinine ratio 28 (H) 12 - 20      GFR est AA 57 (L) >60 ml/min/1.73m2    GFR est non-AA 47 (L) >60 ml/min/1.73m2    Calcium 9.0 8.5 - 10.1 MG/DL    Bilirubin, total 0.5 0.2 - 1.0 MG/DL    ALT (SGPT) 46 12 - 78 U/L    AST (SGOT) 31 15 - 37 U/L Alk. phosphatase 141 (H) 45 - 117 U/L    Protein, total 7.9 6.4 - 8.2 g/dL    Albumin 3.5 3.5 - 5.0 g/dL    Globulin 4.4 (H) 2.0 - 4.0 g/dL    A-G Ratio 0.8 (L) 1.1 - 2.2     TROPONIN-HIGH SENSITIVITY    Collection Time: 02/19/22  6:03 PM   Result Value Ref Range    Troponin-High Sensitivity 2,683 (HH) 0 - 51 ng/L   NT-PRO BNP    Collection Time: 02/19/22  6:03 PM   Result Value Ref Range    NT pro-BNP 4,264 (H) <125 PG/ML   TYPE & SCREEN    Collection Time: 02/19/22  6:03 PM   Result Value Ref Range    Crossmatch Expiration 02/22/2022,2359     ABO/Rh(D) Chana Bath POSITIVE     Antibody screen NEG    COVID-19 RAPID TEST    Collection Time: 02/19/22  6:19 PM   Result Value Ref Range    Specimen source Nasopharyngeal      COVID-19 rapid test Not detected NOTD     EKG, 12 LEAD, INITIAL    Collection Time: 02/19/22  7:13 PM   Result Value Ref Range    Ventricular Rate 53 BPM    Atrial Rate 53 BPM    P-R Interval 160 ms    QRS Duration 118 ms    Q-T Interval 522 ms    QTC Calculation (Bezet) 489 ms    Calculated P Axis 81 degrees    Calculated R Axis 88 degrees    Calculated T Axis 68 degrees    Diagnosis       Sinus bradycardia  Possible Left atrial enlargement  Low voltage QRS  Anterolateral infarct , age undetermined  When compared with ECG of 19-FEB-2022 16:44,  MANUAL COMPARISON REQUIRED, DATA IS UNCONFIRMED         Radiologic Studies -   XR CHEST PORT   Final Result   Diffuse increased interstitial prominence which can be seen with edema and   atypical (viral) infection. CT Results  (Last 48 hours)    None        CXR Results  (Last 48 hours)               02/19/22 1732  XR CHEST PORT Final result    Impression:  Diffuse increased interstitial prominence which can be seen with edema and   atypical (viral) infection. Narrative:  EXAM: XR CHEST PORT       HISTORY: Chest Pain. COMPARISON: 3/3/2010       FINDINGS: Single view(s) of the chest. The heart is on the upper limits of   normal for size.  There is diffuse interstitial prominence that has increased. The patient is status post bilateral breast implants. Medical Decision Making   I am the first provider for this patient. I reviewed the vital signs, available nursing notes, past medical history, past surgical history, family history and social history. Vital Signs-Reviewed the patient's vital signs. Patient Vitals for the past 12 hrs:   Temp Pulse Resp BP SpO2   02/19/22 1853 -- (!) 55 20 (!) 155/100 96 %   02/19/22 1635 97.7 °F (36.5 °C) (!) 49 18 (!) 177/67 97 %       Records Reviewed: Nursing Notes and Old Medical Records    Provider Notes (Medical Decision Making):   Patient presenting with fatigue and lightheadedness. Differential includes symptomatic bradycardia, anemia, dehydration, electrolyte abnormality, infection though less likely, blood loss anemia. ED Course:   Initial assessment performed. The patients presenting problems have been discussed, and they are in agreement with the care plan formulated and outlined with them. I have encouraged them to ask questions as they arise throughout their visit. ED Course as of 02/19/22 1933   Sat Feb 19, 2022   1800 EKG interpreted by me. Shows normal sinus rhythm with a heart rate of 70. Has a left bundle branch block. No STEMI [JS]   1851 Reassessed patient and she is still tachypneic and slightly diaphoretic. Troponin came back at 2600 and her proBNP at 4200. Given her chest x-ray was equivocal, most likely edema that is causing all of this. Likely had a small heart attack 2 weeks ago when this all began and then now heart failure. [JS]   1199 [facility] cardiology looked at EKG. ?dropped beat. Mukesh Muniz. Getting rpt EKG. Start Heparin. [JS]   1915 Repeat EKG shows sinus bradycardia with a heart rate of 53.   No STEMI.  2:1 heart block [JS]   1916 Spoke again with Paul Rao, patient has a 2-1 heart block based on her second EKG so plan will be to go to cardiac cath jeannette. [JS]   1925 Dr. Marina Avila called this in as a STEMI but the second EKG did not show any STEMI. This was to alert the Cath Lab and get them in sooner given the second-degree heart block. Patient already had the aspirin and heparin going now. [JS]      ED Course User Index  [JS] Sandy Israel MD     Critical Care Time:   CRITICAL CARE NOTE :    5:03 PM    IMPENDING DETERIORATION -Airway, Respiratory and Cardiovascular  ASSOCIATED RISK FACTORS - Hypotension, Shock, Hypoxia and Dysrhythmia  MANAGEMENT- Bedside Assessment and Supervision of Care  INTERPRETATION -  Xrays, ECG, Blood Pressure and Cardiac Output Measures   INTERVENTIONS - hemodynamic mngmt and vent mngmt  CASE REVIEW - Hospitalist/Intensivist, Medical Sub-Specialist, Nursing and Family  TREATMENT RESPONSE -Improved  PERFORMED BY - Self    NOTES   :    I have spent 70 minutes of critical care time involved in lab review, consultations with specialist, family decision- making, bedside attention and documentation. During this entire length of time I was immediately available to the patient . Disposition:    Admission Note:  Patient is being admitted to the hospital by Dr. Kalyan Coburn, Service: Hospitalist.  The results of their tests and reasons for their admission have been discussed with them and available family. They convey agreement and understanding for the need to be admitted and for their admission diagnosis. Diagnosis     Clinical Impression:   1. Second degree heart block    2. NSTEMI (non-ST elevated myocardial infarction) ([address]    3. Acute congestive heart failure, unspecified heart failure type (HCC)        Attestations:  Citlalli De Leon M.D. Please note that this dictation was completed with Relead, the computer voice recognition software. Quite often unanticipated grammatical, syntax, homophones, and other interpretive errors are inadvertently transcribed by the computer software. Please disregard these errors.   Please excuse any errors that have escaped final proofreading. Thank you.

## 2022-02-19 NOTE — Clinical Note
Right internal jugular vein. Accessed successfully. Micropuncture needle used. Using fluoro and ultrasound guidance.  Number of attempts =  1.

## 2022-02-19 NOTE — Clinical Note
TRANSFER - OUT REPORT:     Verbal report given to: CCU. Report consisted of patient's Situation, Background, Assessment and   Recommendations(SBAR). Opportunity for questions and clarification was provided. Patient transported with a Registered Nurse and [address] [place] [address] / [employer] [facility] [employer]. Patient transported to: CCU.

## 2022-02-19 NOTE — Clinical Note
A venogram was performed on the coronary sinus. Injected with single hand injection. Injection volume  = 15 mL.

## 2022-02-19 NOTE — Clinical Note
TRANSFER - IN REPORT:     Verbal report received from: CCU. Report consisted of patient's Situation, Background, Assessment and   Recommendations(SBAR). Opportunity for questions and clarification was provided. Assessment completed upon patient's arrival to unit and care assumed. Patient transported with a Registered Nurse and [address] [place] [address] / [facility] [employer]

## 2022-02-19 NOTE — Clinical Note
Rate = 60 bpm.   Sensitivity setting = 5.   2 mA. Electrical capture and mechanical capture obtained.

## 2022-02-19 NOTE — Clinical Note
TRANSFER - OUT REPORT:     Verbal report given to: LINDA VALENCIA. Report consisted of patient's Situation, Background, Assessment and   Recommendations(SBAR). Opportunity for questions and clarification was provided. Patient transported with a Registered Nurse. Patient transported to: CCU, 2548.

## 2022-02-20 ENCOUNTER — APPOINTMENT (OUTPATIENT)
Dept: NON INVASIVE DIAGNOSTICS | Age: 72
DRG: 242 | End: 2022-02-20
Attending: INTERNAL MEDICINE
Payer: MEDICARE

## 2022-02-20 LAB
ANION GAP SERPL CALC-SCNC: 12 MMOL/L (ref 5–15)
ANION GAP SERPL CALC-SCNC: 5 MMOL/L (ref 5–15)
ATRIAL RATE: 53 BPM
ATRIAL RATE: 70 BPM
BASOPHILS # BLD: 0 K/UL (ref 0–0.1)
BASOPHILS NFR BLD: 0 % (ref 0–1)
BUN SERPL-MCNC: 33 MG/DL (ref 6–20)
BUN SERPL-MCNC: 42 MG/DL (ref 6–20)
BUN/CREAT SERPL: 28 (ref 12–20)
BUN/CREAT SERPL: 31 (ref 12–20)
CALCIUM SERPL-MCNC: 8.5 MG/DL (ref 8.5–10.1)
CALCIUM SERPL-MCNC: 8.9 MG/DL (ref 8.5–10.1)
CALCULATED P AXIS, ECG09: 68 DEGREES
CALCULATED P AXIS, ECG09: 81 DEGREES
CALCULATED R AXIS, ECG10: 31 DEGREES
CALCULATED R AXIS, ECG10: 88 DEGREES
CALCULATED T AXIS, ECG11: 56 DEGREES
CALCULATED T AXIS, ECG11: 68 DEGREES
CHLORIDE SERPL-SCNC: 108 MMOL/L (ref 97–108)
CHLORIDE SERPL-SCNC: 111 MMOL/L (ref 97–108)
CO2 SERPL-SCNC: 16 MMOL/L (ref 21–32)
CO2 SERPL-SCNC: 21 MMOL/L (ref 21–32)
CREAT SERPL-MCNC: 1.08 MG/DL (ref 0.55–1.02)
CREAT SERPL-MCNC: 1.52 MG/DL (ref 0.55–1.02)
DIAGNOSIS, 93000: NORMAL
DIAGNOSIS, 93000: NORMAL
DIFFERENTIAL METHOD BLD: ABNORMAL
ECHO AV AREA PEAK VELOCITY: 2.7 CM2
ECHO AV AREA PEAK VELOCITY: 2.9 CM2
ECHO AV AREA PEAK VELOCITY: 2.9 CM2
ECHO AV AREA PEAK VELOCITY: 3.9 CM2
ECHO AV AREA PEAK VELOCITY: 3.9 CM2
ECHO AV AREA VTI: 2.4 CM2
ECHO AV AREA VTI: 2.9 CM2
ECHO AV CUSP MM: 1.3 CM
ECHO AV MEAN GRADIENT: 2 MMHG
ECHO AV MEAN VELOCITY: 0.7 M/S
ECHO AV PEAK GRADIENT: 4 MMHG
ECHO AV PEAK GRADIENT: 5 MMHG
ECHO AV PEAK VELOCITY: 1 M/S
ECHO AV PEAK VELOCITY: 1.1 M/S
ECHO AV VTI: 20 CM
ECHO LA DIAMETER INDEX: 2.16 CM/M2
ECHO LA DIAMETER: 3.8 CM
ECHO LA VOL 2C: 51 ML (ref 22–52)
ECHO LA VOL 4C: 55 ML (ref 22–52)
ECHO LA VOL BP: 55 ML (ref 22–52)
ECHO LA VOL BP: 55 ML (ref 22–52)
ECHO LA VOLUME AREA LENGTH: 57 ML
ECHO LA VOLUME INDEX A2C: 29 ML/M2 (ref 16–34)
ECHO LA VOLUME INDEX A4C: 31 ML/M2 (ref 16–34)
ECHO LA VOLUME INDEX AREA LENGTH: 32 ML/M2 (ref 16–34)
ECHO LV FRACTIONAL SHORTENING: 14 % (ref 28–44)
ECHO LV INTERNAL DIMENSION DIASTOLE INDEX: 2.5 CM/M2
ECHO LV INTERNAL DIMENSION DIASTOLIC: 4.4 CM (ref 3.9–5.3)
ECHO LV INTERNAL DIMENSION SYSTOLIC INDEX: 2.16 CM/M2
ECHO LV INTERNAL DIMENSION SYSTOLIC: 3.8 CM
ECHO LV IVSD: 1.2 CM (ref 0.6–0.9)
ECHO LV MASS 2D: 158.2 G (ref 67–162)
ECHO LV MASS INDEX 2D: 89.9 G/M2 (ref 43–95)
ECHO LV POSTERIOR WALL DIASTOLIC: 0.9 CM (ref 0.6–0.9)
ECHO LV RELATIVE WALL THICKNESS RATIO: 0.41
ECHO LVOT AREA: 3.1 CM2
ECHO LVOT AV VTI INDEX: 0.74
ECHO LVOT DIAM: 2 CM
ECHO LVOT MEAN GRADIENT: 2 MMHG
ECHO LVOT PEAK GRADIENT: 3 MMHG
ECHO LVOT PEAK GRADIENT: 3 MMHG
ECHO LVOT PEAK VELOCITY: 0.9 M/S
ECHO LVOT PEAK VELOCITY: 0.9 M/S
ECHO LVOT STROKE VOLUME INDEX: 26.2 ML/M2
ECHO LVOT SV: 46.2 ML
ECHO LVOT VTI: 14.7 CM
ECHO MV AREA PHT: 8.5 CM2
ECHO MV E DECELERATION TIME (DT): 78.4 MS
ECHO MV E VELOCITY: 1.06 M/S
ECHO MV PRESSURE HALF TIME (PHT): 26 MS
ECHO MV REGURGITANT PEAK GRADIENT: 74 MMHG
ECHO MV REGURGITANT PEAK VELOCITY: 4.3 M/S
ECHO PV MAX VELOCITY: 1 M/S
ECHO PV PEAK GRADIENT: 4 MMHG
ECHO RV FREE WALL PEAK S': 15 CM/S
ECHO RV INTERNAL DIMENSION: 3.6 CM
ECHO RVOT PEAK GRADIENT: 2 MMHG
ECHO RVOT PEAK VELOCITY: 0.8 M/S
ECHO TV REGURGITANT MAX VELOCITY: 3.36 M/S
ECHO TV REGURGITANT PEAK GRADIENT: 45 MMHG
EOSINOPHIL # BLD: 0 K/UL (ref 0–0.4)
EOSINOPHIL NFR BLD: 0 % (ref 0–7)
ERYTHROCYTE [DISTWIDTH] IN BLOOD BY AUTOMATED COUNT: 13.3 % (ref 11.5–14.5)
GLUCOSE SERPL-MCNC: 109 MG/DL (ref 65–100)
GLUCOSE SERPL-MCNC: 143 MG/DL (ref 65–100)
HCT VFR BLD AUTO: 32.7 % (ref 35–47)
HGB BLD-MCNC: 10.8 G/DL (ref 11.5–16)
IMM GRANULOCYTES # BLD AUTO: 0.1 K/UL (ref 0–0.04)
IMM GRANULOCYTES NFR BLD AUTO: 1 % (ref 0–0.5)
LYMPHOCYTES # BLD: 0.8 K/UL (ref 0.8–3.5)
LYMPHOCYTES NFR BLD: 6 % (ref 12–49)
MAGNESIUM SERPL-MCNC: 2.2 MG/DL (ref 1.6–2.4)
MCH RBC QN AUTO: 26.7 PG (ref 26–34)
MCHC RBC AUTO-ENTMCNC: 33 G/DL (ref 30–36.5)
MCV RBC AUTO: 80.9 FL (ref 80–99)
MONOCYTES # BLD: 0.9 K/UL (ref 0–1)
MONOCYTES NFR BLD: 7 % (ref 5–13)
NEUTS SEG # BLD: 11.1 K/UL (ref 1.8–8)
NEUTS SEG NFR BLD: 86 % (ref 32–75)
NRBC # BLD: 0 K/UL (ref 0–0.01)
NRBC BLD-RTO: 0 PER 100 WBC
P-R INTERVAL, ECG05: 160 MS
P-R INTERVAL, ECG05: 178 MS
PHOSPHATE SERPL-MCNC: 4.1 MG/DL (ref 2.6–4.7)
PLATELET # BLD AUTO: 327 K/UL (ref 150–400)
PMV BLD AUTO: 9.4 FL (ref 8.9–12.9)
POTASSIUM SERPL-SCNC: 3.5 MMOL/L (ref 3.5–5.1)
POTASSIUM SERPL-SCNC: 4.3 MMOL/L (ref 3.5–5.1)
Q-T INTERVAL, ECG07: 444 MS
Q-T INTERVAL, ECG07: 522 MS
QRS DURATION, ECG06: 118 MS
QRS DURATION, ECG06: 126 MS
QTC CALCULATION (BEZET), ECG08: 479 MS
QTC CALCULATION (BEZET), ECG08: 489 MS
RBC # BLD AUTO: 4.04 M/UL (ref 3.8–5.2)
RBC MORPH BLD: ABNORMAL
SODIUM SERPL-SCNC: 136 MMOL/L (ref 136–145)
SODIUM SERPL-SCNC: 137 MMOL/L (ref 136–145)
TROPONIN-HIGH SENSITIVITY: 4525 NG/L (ref 0–51)
TROPONIN-HIGH SENSITIVITY: 5140 NG/L (ref 0–51)
VENTRICULAR RATE, ECG03: 53 BPM
VENTRICULAR RATE, ECG03: 70 BPM
WBC # BLD AUTO: 12.9 K/UL (ref 3.6–11)

## 2022-02-20 PROCEDURE — 74011250636 HC RX REV CODE- 250/636: Performed by: NURSE PRACTITIONER

## 2022-02-20 PROCEDURE — 84484 ASSAY OF TROPONIN QUANT: CPT

## 2022-02-20 PROCEDURE — 65620000000 HC RM CCU GENERAL

## 2022-02-20 PROCEDURE — 74011250637 HC RX REV CODE- 250/637: Performed by: NURSE PRACTITIONER

## 2022-02-20 PROCEDURE — 77030038269 HC DRN EXT URIN PURWCK BARD -A

## 2022-02-20 PROCEDURE — 83735 ASSAY OF MAGNESIUM: CPT

## 2022-02-20 PROCEDURE — 80048 BASIC METABOLIC PNL TOTAL CA: CPT

## 2022-02-20 PROCEDURE — 77010033678 HC OXYGEN DAILY

## 2022-02-20 PROCEDURE — 74011000250 HC RX REV CODE- 250: Performed by: INTERNAL MEDICINE

## 2022-02-20 PROCEDURE — 74011250637 HC RX REV CODE- 250/637: Performed by: STUDENT IN AN ORGANIZED HEALTH CARE EDUCATION/TRAINING PROGRAM

## 2022-02-20 PROCEDURE — 36415 COLL VENOUS BLD VENIPUNCTURE: CPT

## 2022-02-20 PROCEDURE — 84100 ASSAY OF PHOSPHORUS: CPT

## 2022-02-20 PROCEDURE — 74011250636 HC RX REV CODE- 250/636: Performed by: STUDENT IN AN ORGANIZED HEALTH CARE EDUCATION/TRAINING PROGRAM

## 2022-02-20 PROCEDURE — 85025 COMPLETE CBC W/AUTO DIFF WBC: CPT

## 2022-02-20 PROCEDURE — 93306 TTE W/DOPPLER COMPLETE: CPT

## 2022-02-20 RX ORDER — SPIRONOLACTONE 25 MG/1
25 TABLET ORAL DAILY
Status: DISCONTINUED | OUTPATIENT
Start: 2022-02-21 | End: 2022-02-21

## 2022-02-20 RX ORDER — POTASSIUM CHLORIDE 20 MEQ/1
40 TABLET, EXTENDED RELEASE ORAL ONCE
Status: COMPLETED | OUTPATIENT
Start: 2022-02-20 | End: 2022-02-20

## 2022-02-20 RX ORDER — POLYETHYLENE GLYCOL 3350 17 G/17G
17 POWDER, FOR SOLUTION ORAL DAILY
Status: DISCONTINUED | OUTPATIENT
Start: 2022-02-21 | End: 2022-02-23 | Stop reason: HOSPADM

## 2022-02-20 RX ORDER — AMOXICILLIN 250 MG
1 CAPSULE ORAL DAILY
Status: DISCONTINUED | OUTPATIENT
Start: 2022-02-21 | End: 2022-02-23 | Stop reason: HOSPADM

## 2022-02-20 RX ORDER — HYDRALAZINE HYDROCHLORIDE 20 MG/ML
10 INJECTION INTRAMUSCULAR; INTRAVENOUS ONCE
Status: COMPLETED | OUTPATIENT
Start: 2022-02-20 | End: 2022-02-20

## 2022-02-20 RX ORDER — SPIRONOLACTONE 25 MG/1
25 TABLET ORAL DAILY
Status: DISCONTINUED | OUTPATIENT
Start: 2022-02-20 | End: 2022-02-21

## 2022-02-20 RX ORDER — POTASSIUM CHLORIDE 20 MEQ/1
40 TABLET, EXTENDED RELEASE ORAL 2 TIMES DAILY
Status: DISCONTINUED | OUTPATIENT
Start: 2022-02-20 | End: 2022-02-21

## 2022-02-20 RX ORDER — SODIUM BICARBONATE 1 MEQ/ML
50 SYRINGE (ML) INTRAVENOUS ONCE
Status: COMPLETED | OUTPATIENT
Start: 2022-02-20 | End: 2022-02-20

## 2022-02-20 RX ADMIN — TICAGRELOR 90 MG: 90 TABLET ORAL at 21:39

## 2022-02-20 RX ADMIN — ASPIRIN 81 MG CHEWABLE TABLET 81 MG: 81 TABLET CHEWABLE at 08:59

## 2022-02-20 RX ADMIN — ROSUVASTATIN 20 MG: 20 TABLET, FILM COATED ORAL at 21:39

## 2022-02-20 RX ADMIN — TICAGRELOR 90 MG: 90 TABLET ORAL at 09:02

## 2022-02-20 RX ADMIN — HYDRALAZINE HYDROCHLORIDE 10 MG: 20 INJECTION INTRAMUSCULAR; INTRAVENOUS at 00:47

## 2022-02-20 RX ADMIN — FUROSEMIDE 40 MG: 10 INJECTION, SOLUTION INTRAVENOUS at 08:59

## 2022-02-20 RX ADMIN — POTASSIUM CHLORIDE 40 MEQ: 20 TABLET, EXTENDED RELEASE ORAL at 08:59

## 2022-02-20 RX ADMIN — SODIUM BICARBONATE 50 MEQ: 84 INJECTION, SOLUTION INTRAVENOUS at 12:45

## 2022-02-20 RX ADMIN — LOSARTAN POTASSIUM 50 MG: 50 TABLET, FILM COATED ORAL at 09:00

## 2022-02-20 NOTE — H&P
Hospitalist Admission Note    NAME: Saeed Seo   :  1950   MRN:  [id number]     Date/Time:  2022 7:36 PM    Patient PCP: Cabrera Youngblood MD  _____________________________________________________________________  Given the patient's current clinical presentation, I have a high level of concern for decompensation if discharged from the emergency department. Complex decision making was performed, which includes reviewing the patient's available past medical records, laboratory results, and x-ray films. My assessment of this patient's clinical condition and my plan of care is as follows. Assessment / Plan:  NSTEMI  Interstitial edema  Bradycardia  Admit to step down  Trop 2.6k  BNP 4k  Cardiology consulted  Plan for cardiac cath tonight  Full dose ASA  Hold off on BB due to bradycardia  Currently with nitro patch  Lasix 40 mg IV given  Check ECHO  Tele  Admit to step down  Maintain SPO2 > 92%   C/w heparin gtt    HTN  Hold antihypertensives    Code Status: Full  Surrogate Decision Maker: Umu Guidry    DVT Prophylaxis: Heparin gtt  GI Prophylaxis: not indicated    Baseline: Independent        Subjective:   CHIEF COMPLAINT:  SOB, diziness    HISTORY OF PRESENT ILLNESS:     Lyly Bone is a 66 y.o. female with a past medical history significant for hypertension who presents to the ED with worsening shortness of breath. Patient states that she began to have symptoms of a slow heart rate and occasional shortness of breath back in /July when she was started on nifedipine for her blood pressure. Her symptoms began to worsen in January and her physician instructed her to cut back to 1 tab a day from 2 tabs. She also states that she has been taking her heart rate at home and sometimes drops into the 40s. She states that she will often get lightheaded and dizzy and shortness of breath on exertion.   However, over the past week her shortness of breath progressed and she is now short of breath at rest.  She does have conversational dyspnea. She did have a appointment to see the cardiologist but was not till next month and she cannot wait that long due to her worsening symptoms. She states that she is unable to lie flat without becoming short of breath. She also has frequent episodes of lightheadedness and dizziness. She did have one isolated episode of chest pain that was located on the left side today but that has since resolved. She denies any fevers or chills. No nausea vomiting or diarrhea. She states that she has never had anything like this before starting her medication in June/July    She denies any swelling in her lower extremities    Patient's  and nephew are at bedside during the encounter    In the ED, she was found to have an elevated troponin with abnormal EKG. Cardiology was called and she is going to undergo cardiac catheterization this evening. She has placed on supplemental oxygen as well. She was given full dose aspirin and a nitro patch and placed on a heparin drip. We were asked to admit for work up and evaluation of the above problems.      Past Medical History:   Diagnosis Date    Arthritis     hands,  left knee    Colon polyps     Hypertension     IBS (irritable bowel syndrome)     colostomy  revresed    Nausea & vomiting     anesthesia related    Other ill-defined conditions(799.89)     seasonal allergies        Past Surgical History:   Procedure Laterality Date    ABDOMEN SURGERY PROC UNLISTED  3/10    sigmoid colectomy (colostomy)-reversed    COLONOSCOPY N/A 9/8/2020    COLONOSCOPY performed by Mariia Campos [facility] at [facility]COPY    HX APPENDECTOMY      HX CHOLECYSTECTOMY  09/06/13    Laparoscopic Cholecystectomty @ [address]    HX DILATION AND CURETTAGE      HX HYSTERECTOMY      RSO    HX MOHS PROCEDURES      right    HX ORTHOPAEDIC  [id number]    \"removal right kneecap\"--\"infection\"    HX ORTHOPAEDIC      right knee bursa  HX OTHER SURGICAL  2010    colonoscopy-polyps --perforated sigmoid     HX PELVIC LAPAROSCOPY      HX TONSILLECTOMY         Social History     Tobacco Use    Smoking status: Never Smoker    Smokeless tobacco: Never Used   Substance Use Topics    Alcohol use: No        Family History   Problem Relation Age of Onset    Heart Failure Mother     Heart Attack Father     Diabetes Father     Heart Failure Brother      Allergies   Allergen Reactions    Adhesive Tape Rash     Severe rash if adhesive on for more than one day    Scopolamine Other (comments)     Had scopolamine patch when removed area looked burned and skin red and irritated    Codeine Nausea and Vomiting    Sulfa (Sulfonamide Antibiotics) Nausea and Vomiting    Azithromycin Rash    Ceclor [Cefaclor] Nausea and Vomiting    Trazodone Other (comments)     dizzy    Zolpidem Other (comments)     dizzy        Prior to Admission medications    Medication Sig Start Date End Date Taking? Authorizing Provider   OTHER Morphine 240mg / Aquaphon 160 mg  Ointment for right knee    Provider, Historical   AZELASTINE/FLUTICASONE (DYMISTA NA) by Nasal route. As needed    Provider, Historical   lidocaine (LIDODERM) 5 %(700 mg/patch) 1 Patch by TransDERmal route every twenty-four (24) hours. As needed for pain    Provider, Historical   lisinopril-hydrochlorothiazide (PRINZIDE, ZESTORETIC) 20-12.5 mg per tablet Take 1 Tab by mouth every morning. Other, MD Brennon       REVIEW OF SYSTEMS:     I am not able to complete the review of systems because:    The patient is intubated and sedated    The patient has altered mental status due to his acute medical problems    The patient has baseline aphasia from prior stroke(s)    The patient has baseline dementia and is not reliable historian    The patient is in acute medical distress and unable to provide information           Total of 12 systems reviewed as follows:       POSITIVE= underlined text  Negative = text not underlined  General:  fever, chills, sweats, generalized weakness, weight loss/gain,      loss of appetite   Eyes:    blurred vision, eye pain, loss of vision, double vision  ENT:    rhinorrhea, pharyngitis      Gastrointestinal:  abdominal pain , N/V, diarrhea, dysphagia, constipation, bleeding   Genitourinary:  frequency, urgency, dysuria, hematuria, incontinence   Muskuloskeletal :  arthralgia, myalgia, back pain  Hematology:  easy bruising, nose or gum bleeding, lymphadenopathy   Dermatological: rash, ulceration, pruritis, color change / jaundice  Endocrine:   hot flashes or polydipsia   Neurological:  headache, dizziness, confusion, focal weakness, paresthesia,     Speech difficulties, memory loss, gait difficulty  Psychological: Feelings of anxiety, depression, agitation    Objective:   VITALS:    Visit Vitals  BP (!) 155/100 (BP Patient Position: At rest)   Pulse (!) 55   Temp 97.7 °F (36.5 °C)   Resp 20   Ht 5' 5\" (1.651 m)   Wt 69.2 kg (152 lb 8.9 oz)   SpO2 96%   BMI 25.39 kg/m²       PHYSICAL EXAM:    General:    Alert, cooperative, no distress, appears stated age. HEENT: Atraumatic, anicteric sclerae, pink conjunctivae     No oral ulcers, mucosa moist, throat clear, dentition fair  Neck:  Supple, symmetrical,  thyroid: non tender  Lungs:   Clear to auscultation bilaterally. No Wheezing or Rhonchi. No rales. Chest wall:  No tenderness  No Accessory muscle use. Heart:   Regular  rhythm,  No  murmur   No edema  Abdomen:   Soft, non-tender. Not distended. Bowel sounds normal  Extremities: No cyanosis. No clubbing,      Skin turgor normal, Capillary refill normal, Radial dial pulse 2+  Skin:     Not pale. Not Jaundiced  No rashes   Psych:  Good insight. Not depressed. Not anxious or agitated. Neurologic: EOMs intact. No facial asymmetry. No aphasia or slurred speech. Symmetrical strength, Sensation grossly intact. Alert and oriented X 4. _______________________________________________________________________  Care Plan discussed with:    Comments   Patient x    Family      RN     Care Manager                    Consultant:      _______________________________________________________________________  Expected  Disposition:   Home with Family    HH/PT/OT/RN    SNF/LTC    [facility]    ________________________________________________________________________  TOTAL TIME:  39  Minutes    Critical Care Provided     Minutes non procedure based      Comments     Reviewed previous records   >50% of visit spent in counseling and coordination of care  Discussion with patient and/or family and questions answered       Given the patient's current clinical presentation, I have a high level of concern for decompensation if discharged from the ED. Complex decision making was performed which includes reviewing the patient's available past medical records, laboratory results, and Xray films. I have also directly communicated my plan and discussed this case with the involved ED physician.     ____________________________________________________________________  Jamil Guerrier MD    Procedures: see electronic medical records for all procedures/Xrays and details which were not copied into this note but were reviewed prior to creation of Plan.     LAB DATA REVIEWED:    Recent Results (from the past 24 hour(s))   EKG, 12 LEAD, INITIAL    Collection Time: 02/19/22  4:44 PM   Result Value Ref Range    Ventricular Rate 70 BPM    Atrial Rate 70 BPM    P-R Interval 178 ms    QRS Duration 126 ms    Q-T Interval 444 ms    QTC Calculation (Bezet) 479 ms    Calculated P Axis 68 degrees    Calculated R Axis 31 degrees    Calculated T Axis 56 degrees    Diagnosis       Sinus rhythm with marked sinus arrhythmia  Possible Left atrial enlargement  Left bundle branch block  When compared with ECG of 30-AUG-2013 09:50,  Left bundle branch block is now present     CBC WITH AUTOMATED DIFF Collection Time: 02/19/22  6:03 PM   Result Value Ref Range    WBC 12.2 (H) 3.6 - 11.0 K/uL    RBC 3.98 3.80 - 5.20 M/uL    HGB 10.6 (L) 11.5 - 16.0 g/dL    HCT 33.1 (L) 35.0 - 47.0 %    MCV 83.2 80.0 - 99.0 FL    MCH 26.6 26.0 - 34.0 PG    MCHC 32.0 30.0 - 36.5 g/dL    RDW 13.7 11.5 - 14.5 %    PLATELET 428 325 - 864 K/uL    MPV 9.4 8.9 - 12.9 FL    NRBC 0.0 0  WBC    ABSOLUTE NRBC 0.00 0.00 - 0.01 K/uL    NEUTROPHILS 79 (H) 32 - 75 %    LYMPHOCYTES 12 12 - 49 %    MONOCYTES 7 5 - 13 %    EOSINOPHILS 1 0 - 7 %    BASOPHILS 0 0 - 1 %    IMMATURE GRANULOCYTES 1 (H) 0.0 - 0.5 %    ABS. NEUTROPHILS 9.6 (H) 1.8 - 8.0 K/UL    ABS. LYMPHOCYTES 1.4 0.8 - 3.5 K/UL    ABS. MONOCYTES 0.9 0.0 - 1.0 K/UL    ABS. EOSINOPHILS 0.1 0.0 - 0.4 K/UL    ABS. BASOPHILS 0.0 0.0 - 0.1 K/UL    ABS. IMM. GRANS. 0.1 (H) 0.00 - 0.04 K/UL    DF AUTOMATED     METABOLIC PANEL, COMPREHENSIVE    Collection Time: 02/19/22  6:03 PM   Result Value Ref Range    Sodium 137 136 - 145 mmol/L    Potassium 4.6 3.5 - 5.1 mmol/L    Chloride 108 97 - 108 mmol/L    CO2 20 (L) 21 - 32 mmol/L    Anion gap 9 5 - 15 mmol/L    Glucose 123 (H) 65 - 100 mg/dL    BUN 32 (H) 6 - 20 MG/DL    Creatinine 1.13 (H) 0.55 - 1.02 MG/DL    BUN/Creatinine ratio 28 (H) 12 - 20      GFR est AA 57 (L) >60 ml/min/1.73m2    GFR est non-AA 47 (L) >60 ml/min/1.73m2    Calcium 9.0 8.5 - 10.1 MG/DL    Bilirubin, total 0.5 0.2 - 1.0 MG/DL    ALT (SGPT) 46 12 - 78 U/L    AST (SGOT) 31 15 - 37 U/L    Alk.  phosphatase 141 (H) 45 - 117 U/L    Protein, total 7.9 6.4 - 8.2 g/dL    Albumin 3.5 3.5 - 5.0 g/dL    Globulin 4.4 (H) 2.0 - 4.0 g/dL    A-G Ratio 0.8 (L) 1.1 - 2.2     TROPONIN-HIGH SENSITIVITY    Collection Time: 02/19/22  6:03 PM   Result Value Ref Range    Troponin-High Sensitivity 2,683 (HH) 0 - 51 ng/L   NT-PRO BNP    Collection Time: 02/19/22  6:03 PM   Result Value Ref Range    NT pro-BNP 4,264 (H) <125 PG/ML   TYPE & SCREEN    Collection Time: 02/19/22  6:03 PM   Result Value Ref Range    Crossmatch Expiration 02/22/2022,2359     ABO/Rh(D) Andreina Davidson POSITIVE     Antibody screen NEG    COVID-19 RAPID TEST    Collection Time: 02/19/22  6:19 PM   Result Value Ref Range    Specimen source Nasopharyngeal      COVID-19 rapid test Not detected NOTD     EKG, 12 LEAD, INITIAL    Collection Time: 02/19/22  7:13 PM   Result Value Ref Range    Ventricular Rate 53 BPM    Atrial Rate 53 BPM    P-R Interval 160 ms    QRS Duration 118 ms    Q-T Interval 522 ms    QTC Calculation (Bezet) 489 ms    Calculated P Axis 81 degrees    Calculated R Axis 88 degrees    Calculated T Axis 68 degrees    Diagnosis       Sinus bradycardia  Possible Left atrial enlargement  Low voltage QRS  Anterolateral infarct , age undetermined  When compared with ECG of 19-FEB-2022 16:44,  MANUAL COMPARISON REQUIRED, DATA IS UNCONFIRMED

## 2022-02-20 NOTE — PROGRESS NOTES
[facility]    ICU TEAM Progress Note    Name: Gomez Thompson   : 1950   MRN: [id number]   Date: 2022      Subjective:     Progress Note: 2022    Denies any chest pain or shortness of breath  Still appears to be 2: 1 block    Reason for ICU Admission: s/p temp pacemaker     Overnight Events: cardiac cath and temp pacemaker insertion    HPI: Oswald Nix is a 66 y.o. female with a past medical history significant for hypertension who presents to the ED with worsening shortness of breath. Patient states that she began to have symptoms of a slow heart rate and occasional shortness of breath back in  when she was started on nifedipine for her blood pressure. Her symptoms began to worsen in January and her physician instructed her to cut back to 1 tab a day from 2 tabs. She also states that she has been taking her heart rate at home and sometimes drops into the 40s. She states that she will often get lightheaded and dizzy and shortness of breath on exertion. However, over the past week her shortness of breath progressed and she is now short of breath at rest.  She does have conversational dyspnea. She did have a appointment to see the cardiologist but was not till next month and she cannot wait that long due to her worsening symptoms. She states that she is unable to lie flat without becoming short of breath. She also has frequent episodes of lightheadedness and dizziness. She did have one isolated episode of chest pain that was located on the left side today but that has since resolved. She denies any fevers or chills. No nausea vomiting or diarrhea. She states that she has never had anything like this before starting her medication in      She denies any swelling in her lower extremities     Patient's  and nephew are at bedside during the encounter     In the ED, she was found to have an elevated troponin with abnormal EKG.   Cardiology was called and she is going to undergo cardiac catheterization this evening. She has placed on supplemental oxygen as well. She was given full dose aspirin and a nitro patch and placed on a heparin drip\" (Dr. Chiquis Burnett MD). Transferred to the ICU s/p cath and temp pacemaker        POD:Day of Surgery 2/19/22    S/P: Procedure(s):  LEFT HEART CATH / CORONARY ANGIOGRAPHY  PERCUTANEOUS CORONARY INTERVENTION  ANGIOPLASTY CORONARY  INSERT STENT ANAYA CORONARY  LEFT VENTRICULOGRAPHY  INSERT TEMPORARY PACEMAKER    Active Problem List:     Problem List  Date Reviewed: 9/8/2020          Codes Class    NSTEMI (non-ST elevated myocardial infarction) ([facility] [address] ICD-10-CM: I21.4  ICD-9-CM: 410.70         [facility] ICD-10-CM: K80.20  ICD-9-CM: 574.20               Past Medical History:      has a past medical history of Arthritis, Colon polyps, Hypertension, IBS (irritable bowel syndrome), Nausea & vomiting, and Other ill-defined conditions(799.89). She has no past medical history of Unspecified adverse effect of anesthesia. Past Surgical History:      has a past surgical history that includes hx mohs procedure; pr abdomen surgery proc unlisted (3/10); hx cholecystectomy (09/06/13); hx appendectomy; hx hysterectomy; hx dilation and curettage; hx pelvic laparoscopy; hx other surgical (2010); hx orthopaedic ([id number]); hx orthopaedic; hx tonsillectomy; and colonoscopy (N/A, 9/8/2020). Home Medications:     Prior to Admission medications    Medication Sig Start Date End Date Taking? Authorizing Provider   OTHER Morphine 240mg / Aquaphon 160 mg  Ointment for right knee    Provider, Historical   AZELASTINE/FLUTICASONE (DYMISTA NA) by Nasal route. As needed    Provider, Historical   lidocaine (LIDODERM) 5 %(700 mg/patch) 1 Patch by TransDERmal route every twenty-four (24) hours. As needed for pain    Provider, Historical   lisinopril-hydrochlorothiazide (PRINZIDE, ZESTORETIC) 20-12.5 mg per tablet Take 1 Tab by mouth every morning.     Other, MD Brennon       Allergies/Social/Family History: Allergies   Allergen Reactions    Adhesive Tape Rash     Severe rash if adhesive on for more than one day    Scopolamine Other (comments)     Had scopolamine patch when removed area looked burned and skin red and irritated    Codeine Nausea and Vomiting    Sulfa (Sulfonamide Antibiotics) Nausea and Vomiting    Azithromycin Rash    Ceclor [Cefaclor] Nausea and Vomiting    Trazodone Other (comments)     dizzy    Zolpidem Other (comments)     dizzy      Social History     Tobacco Use    Smoking status: Never Smoker    Smokeless tobacco: Never Used   Substance Use Topics    Alcohol use: No      Family History   Problem Relation Age of Onset    Heart Failure Mother     Heart Attack Father     Diabetes Father     Heart Failure Brother        Review of Systems:     Deferred     Objective:   Vital Signs:  Visit Vitals  /62   Pulse (!) 54   Temp 97.9 °F (36.6 °C)   Resp 24   Ht 5' 5\" (1.651 m)   Wt 69.2 kg (152 lb 8.9 oz)   SpO2 95%   BMI 25.39 kg/m²    O2 Flow Rate (L/min): 2 l/min O2 Device: Nasal cannula Temp (24hrs), Av.8 °F (36.6 °C), Min:97.6 °F (36.4 °C), Max:97.9 °F (36.6 °C)           Intake/Output:     Intake/Output Summary (Last 24 hours) at 2022 1158  Last data filed at 2022 0856  Gross per 24 hour   Intake --   Output 1200 ml   Net -1200 ml       Physical Exam:    Physical Exam  Constitutional:       Appearance: Normal appearance. She is normal weight. HENT:      Head: Normocephalic and atraumatic. Nose: Nose normal.      Mouth/Throat:      Mouth: Mucous membranes are moist.      Pharynx: No oropharyngeal exudate or posterior oropharyngeal erythema. Eyes:      Extraocular Movements: Extraocular movements intact. Conjunctiva/sclera: Conjunctivae normal.      Pupils: Pupils are equal, round, and reactive to light. Cardiovascular:      Rate and Rhythm: Normal rate and regular rhythm.       Heart sounds: No murmur heard.  No friction rub. No gallop. Comments: Pm at 50 with mAs 5   Pulmonary:      Effort: Pulmonary effort is normal.      Breath sounds: Normal breath sounds. Abdominal:      General: There is no distension. Palpations: Abdomen is soft. Tenderness: There is no abdominal tenderness. Musculoskeletal:         General: Normal range of motion. Cervical back: Normal range of motion. Skin:     General: Skin is warm and dry. Capillary Refill: Capillary refill takes less than 2 seconds. Comments: Right IJ temp pm insertion site c/d/i  Right radial with band in place   Neurological:      General: No focal deficit present. Mental Status: She is alert and oriented to person, place, and time. Psychiatric:         Mood and Affect: Mood normal.         Behavior: Behavior normal.           LABS AND  DATA: Personally reviewed  Recent Labs     02/20/22 0431 02/19/22 1939   WBC 12.9* 12.5*   HGB 10.8* 11.0*   HCT 32.7* 34.0*    350     Recent Labs     02/20/22 0431 02/19/22  1803    137   K 3.5 4.6    108   CO2 16* 20*   BUN 33* 32*   CREA 1.08* 1.13*   * 123*   CA 8.9 9.0   MG 2.2  --    PHOS 4.1  --      Recent Labs     02/19/22 1803   *   TP 7.9   ALB 3.5   GLOB 4.4*     Recent Labs     02/19/22 1939   APTT >130.0*      No results for input(s): PHI, PCO2I, PO2I, FIO2I in the last 72 hours. No results for input(s): CPK, CKMB, TROIQ, BNPP in the last 72 hours. H  MEDS: Reviewed    Chest X-Ray: personally reviewed and report checked     FINDINGS: Single view(s) of the chest. The heart is on the upper limits of  normal for size. There is diffuse interstitial prominence that has increased.   The patient is status post bilateral breast implants.     IMPRESSION  Diffuse increased interstitial prominence which can be seen with edema and  atypical (viral) infection.     Cath results:  Brief procedure Result:   Single vessel CAD involving critical lesion of mid LAD but with SIMON 3 flow   Successful PCI of mid LAD with ANAYA x1  Successful placement of temporary pacemaker through right IJ approach (at HR of 50 and amp of 5)   EF 30-35% with apical hypokinesis     Assessment & Plan:   Heart block, type 2 with LBBB: s/p temp pacemaker. Currently set at 48 with mAs 5.   - Hold beta blocker  - consult to EP  - plan for ppm on Monday    NSTEMI r/t CAD: Status post cardiac catheterization demonstrating single-vessel CAD involving critical lesion of the mid LAD. Patient received drug-eluting stent to the mid LAD. -Continue aspirin and Brilinta  -Start statin therapy  -Resume losartan as blood pressure allows  -Follow-up cardiology recommendations  -Continuous telemetry monitoring    Heart failure with reduced EF: LV gram showed reduced EF of 30 to 35% during cardiac catheterization.  -Check echocardiogram in a.m.  -Optimize volume status  -Daily weights  -Strict I's and O's    Hypertension, essential:   -Continue losartan  -Add spironolactone as blood pressure allows    Renal : correct potassium   One amp of sodium bicarb  Cause of acidosis not clear : any hypotension? Appears to be non anion gap         ICU Comprehensive Plan of Care:   Plans for this Shift:   1. Cath recovery protocol  2. Supportive care   3. Echo in am     Multidisciplinary Rounds Completed:  Pending    ABCDEF Bundle/Checklist  Pain Medications: None  Target RASS: 0 - Alert & Calm - Spontaneously pays attention to caregiver  Sedation Medications: None  CAM-ICU:  Negative  Mobility: Good   PT/OT: NI   Restraints: None needed at this time  Discussed Plan of Care (goals of care): No  Addressed Code Status: Full Code    CARDIOVASCULAR  Cardiac Gtts: None  SBP Goal of: < 160 mmHg  MAP Goal of: > 65 mmHg  Transfusion Trigger (Hgb): <7 g/dL    RESPIRATORY  Vent Goals:    Incentive spirometry  DVT Prophylaxis (if no, list reason): SCD's or Sequential Compression Device   SPO2 Goal: > 92%  Pulmonary toilet: Incentive Spirometry     GI/  Montanez Catheter Present: Yes  GI Prophylaxis: NI  Nutrition: Pending   IVFs: PIV  Bowel Movement: Pending  Bowel Regimen: None needed at this time  Insulin: NI    ANTIBIOTICS  Antibiotics:  none     T/L/D  Tubes: None  Lines: Peripheral IV  Drains: Montanez Catheter    SPECIAL EQUIPMENT  Temporary Pacemaker    DISPOSITION  Stay in ICU    CRITICAL CARE CONSULTANT NOTE  I had a face to face encounter with the patient, reviewed and interpreted patient data including clinical events, labs, images, vital signs, I/O's, and examined patient. I have discussed the case and the plan and management of the patient's care with the consulting services, the bedside nurses and the respiratory therapist.      NOTE OF PERSONAL INVOLVEMENT IN CARE   This patient has a high probability of imminent, clinically significant deterioration, which requires the highest level of preparedness to intervene urgently. I participated in the decision-making and personally managed or directed the management of the following life and organ supporting interventions that required my frequent assessment to treat or prevent imminent deterioration. I personally spent 30 minutes of critical care time. This is time spent at this critically ill patient's bedside actively involved in patient care as well as the coordination of care and discussions with the patient's family. This does not include any procedural time which has been billed separately.     [address] [facility]  2/20/2022

## 2022-02-20 NOTE — PROGRESS NOTES
1700  BMP sent as ordered. 1900  poor po intake today. States she feel constipated. Dr. Joseph Hernandez notified.  Will start bowel regimen in AM.

## 2022-02-20 NOTE — CONSULTS
IP Cardiology Consult       Date of consult:  02/19/22  Date of admission: 2/19/2022  Primary Cardiologist: JL  Physician Requesting consult: Dr Dimas Nephew:    Problem list:   1. NSTEMI  2. Second degree type 2 AV block   3. LBBB  4. Heart failure, unknown EF  5. HTN  6. FH of CAD   7. Retired, , lives with         Recommendations:    1. Cont aspirin and heparin   2. Will need urgent LHC, high suspicion for ACS, discussed about risk and benefit of LHC, agreed to proceed   3. Will also need temp PM for now   4. Given IV lasix   5. Nitro paste   6. Cont crestor   7. No b blocker due to bradycardia   8. Echocardiogram     Thank you for this consult and allowing me to take part in this patients care. Please call with questions. [x]        High complexity decision making was performed      CC / Reason for consult:  NSTEMI, Bradycardia     History of the presenting illness:  Gomez Thompson is a 66 y.o. female with PMH of HTN   Had dizziness intermittently since January, thought it was due to nifedipine she was started on, it was discontinued   Her HR has been in 45s in January and since February beginning her HR is 30-40s multiple times (She has diary of these recordings)   She then on Tuesday started having sob with exertion, she talked to PCP and referred to [facility], has appointment in march   Today started chest pain, around 12 and intermittent, says if she tries to do any thing then has chest pain and resolves with rest   EKG showed LBBB, type 2 AV block, one EKG showed 2:1 AV block with LBBB pattern and there was minimal concordant ST elevation in V6   Trop 2683 and BNP 4264. CXR showed pulm edema.          Past Medical History:   Diagnosis Date    Arthritis     hands,  left knee    Colon polyps     Hypertension     IBS (irritable bowel syndrome)     colostomy  revresed    Nausea & vomiting     anesthesia related    Other ill-defined conditions(799.89)     seasonal allergies       Prior to Admission medications    Medication Sig Start Date End Date Taking? Authorizing Provider   OTHER Morphine 240mg / Aquaphon 160 mg  Ointment for right knee    Provider, Historical   AZELASTINE/FLUTICASONE (DYMISTA NA) by Nasal route. As needed    Provider, Historical   lidocaine (LIDODERM) 5 %(700 mg/patch) 1 Patch by TransDERmal route every twenty-four (24) hours. As needed for pain    Provider, Historical   lisinopril-hydrochlorothiazide (PRINZIDE, ZESTORETIC) 20-12.5 mg per tablet Take 1 Tab by mouth every morning. Other, MD Brennon       Family History   Problem Relation Age of Onset    Heart Failure Mother     Heart Attack Father     Diabetes Father     Heart Failure Brother         Social History     Socioeconomic History    Marital status:      Spouse name: Not on file    Number of children: Not on file    Years of education: Not on file    Highest education level: Not on file   Occupational History    Not on file   Tobacco Use    Smoking status: Never Smoker    Smokeless tobacco: Never Used   Substance and Sexual Activity    Alcohol use: No    Drug use: No     Types: Prescription, OTC    Sexual activity: Not on file   Other Topics Concern    Not on file   Social History Narrative    Not on file     Social Determinants of Health     Financial Resource Strain:     Difficulty of Paying Living Expenses: Not on file   Food Insecurity:     Worried About Running Out of Food in the Last Year: Not on file    Gonzalez of Food in the Last Year: Not on file   Transportation Needs:     Lack of Transportation (Medical): Not on file    Lack of Transportation (Non-Medical):  Not on file   Physical Activity:     Days of Exercise per Week: Not on file    Minutes of Exercise per Session: Not on file   Stress:     Feeling of Stress : Not on file   Social Connections:     Frequency of Communication with Friends and Family: Not on file    Frequency of Social Gatherings with Friends and Family: Not on file    Attends Sabianist Services: Not on file    Active Member of Clubs or Organizations: Not on file    Attends Club or Organization Meetings: Not on file    Marital Status: Not on file   Intimate Partner Violence:     Fear of Current or Ex-Partner: Not on file    Emotionally Abused: Not on file    Physically Abused: Not on file    Sexually Abused: Not on file   Housing Stability:     Unable to Pay for Housing in the Last Year: Not on file    Number of [place] in the Last Year: Not on file    Unstable Housing in the Last Year: Not on file         ROS      Total of 12 systems reviewed, all systems review was negative except Pertinent Positives included in HPI     Visit Vitals  BP (!) 155/100 (BP Patient Position: At rest)   Pulse (!) 55   Temp 97.7 °F (36.5 °C)   Resp 20   Ht 5' 5\" (1.651 m)   Wt 69.2 kg (152 lb 8.9 oz)   SpO2 96%   BMI 25.39 kg/m²       Physical Exam  Examination:     General: Alert + Oriented x3, no acute distress   HEENT: Normocephalic aromatic, MMM   Neck: Supple, JVP- not well appreciated   RS: Non labored, Wheezing    CVS: Regular rate and rhythm, S1S2, no murmur   Abd: Soft, non tender, non distended   Lower extremity: Warm to touch, Edema- None   Skin: Warm and dry, No significant bruises or rash   CNS: Oriented x3, no focal neuro deficit     Lab review:  BMP:   Lab Results   Component Value Date/Time     02/19/2022 06:03 PM    K 4.6 02/19/2022 06:03 PM     02/19/2022 06:03 PM    CO2 20 (L) 02/19/2022 06:03 PM    AGAP 9 02/19/2022 06:03 PM     (H) 02/19/2022 06:03 PM    BUN 32 (H) 02/19/2022 06:03 PM    CREA 1.13 (H) 02/19/2022 06:03 PM    GFRAA 57 (L) 02/19/2022 06:03 PM    GFRNA 47 (L) 02/19/2022 06:03 PM        CBC:  Lab Results   Component Value Date/Time    WBC 12.2 (H) 02/19/2022 06:03 PM    HGB 10.6 (L) 02/19/2022 06:03 PM    HCT 33.1 (L) 02/19/2022 06:03 PM    PLATELET 384 57/93/0170 06:03 PM    MCV 83.2 02/19/2022 06:03 PM       All Cardiac Markers in the last 24 hours:    Lab Results   Component Value Date/Time    BNPNT 4,264 (H) 02/19/2022 06:03 PM       Data review:    Tele:  2:1 AV block     EKG tracing personally reviewed:   NST, 2nd degree type 2 AV block, LBBB     Echocardiogram:  NA    Other cardiac testing:  NA    Other imaging:  Pulm edema       Signed:  Haley Devine MD  Interventional Cardiology  2/19/2022

## 2022-02-20 NOTE — PROGRESS NOTES
Cardiology Progress Note  2/20/2022     Admit Date: 2/19/2022  Admit Diagnosis: NSTEMI (non-ST elevated myocardial infarction) ([facility] [address] [I21.4]  CC: none currently  Cardiac Assessment/Plan (per Dr Alexsandra Núñez):     1. NSTEMI  2. Second degree type 2 AV block   3. LBBB  4. Heart failure, unknown EF  5. HTN  6. FH of CAD     Cath 2/19/22:  Single vessel CAD involving critical lesion of mid LAD but with SIMON 3 flow   Successful PCI of mid LAD with ANAYA x1  Successful placement of temporary pacemaker through right IJ approach (at HR of 50 and amp of 5)   EF 30-35% with apical hypokinesis      Recommendations:      Continue aspirin and brilinta   Statin   Hold b blocker due to bradycardia   Diuresis with IV lasix as tolerates   Resume losartan   Add spironolactone as BP tolerates      LV gram showed reduced EF- 30-35%, but appears to be stress cardiomyopathy pattern - though it could not be labeled as stress CMP as she has LAD disease. Will check 2d echocardiogram.       Her bradycardia is present since last several weeks and she started having chest pain today. Her bradycardia is NOT related to LAD disease, she has conduction system disease with type 2 AV block and 2:1 AV block and LBBB pattern. She will need EP eval and PPM or BiV pacemaker. Echo to reassess LVEF.     2/20: No CP/dyspnea at rest.  BPs 130-150; HR 50s; 2:1 AVblock, intermittent Vpacing after dropped P; 95% 2L; Good UOP; PM threshold 1. WBC 13 (c/w stress shift); Hg 10.8; C 3.5; Cr 1.08; CO2 16. Trop 2.7, 5.1, 4.5 ng/ml    Cardiac meds: asa; brilinta 90 bid; lasix 40 IV qday; losartan 50; crestor 20;     Changed PM to rate 45 and output 10 (to see if still having type 2 AVB). IV lasix today; add aldactone 25 tomorrow (extra lasix as needed). NPO p MN for possible PPM  Echo pending. Obviously no bblocker. For other plans, see orders.       High complexity decision making was performed  X Yes   High-risk of decompensation with multiple organ involvement X Yes         Hospital problem list     Active Hospital Problems    Diagnosis Date Noted    NSTEMI (non-ST elevated myocardial infarction) ([facility] [address] 2022        Subjective: Senthil Kelly reports       Chest pain X none  consistent with:  Non-cardiac CP         Atypical CP     None now  On going  Anginal CP     Dyspnea  none  at rest  with exertion        x improved  unchanged  worse              PND X none  overnight       Orthopnea X none  improved  unchanged  worse   Presyncope X none  improved  unchanged  worse     Ambulated in hallway without symptoms   Yes   Ambulated in room without symptoms  Yes   Objective:     Physical Exam:  Overall VSSAF;    Visit Vitals  BP (!) 143/61   Pulse (!) 54   Temp 97.8 °F (36.6 °C)   Resp 21   Ht 5' 5\" (1.651 m)   Wt 69.2 kg (152 lb 8.9 oz)   SpO2 95%   BMI 25.39 kg/m²     Temp (24hrs), Av.7 °F (36.5 °C), Min:97.6 °F (36.4 °C), Max:97.8 °F (36.6 °C)    Patient Vitals for the past 8 hrs:   Pulse   22 0500 (!) 54   22 0430 (!) 55   22 0400 (!) 52   22 0330 (!) 55   22 0300 (!) 54   22 0230 89   22 0200 (!) 109   22 0140 (!) 56   22 0135 (!) 56   22 0130 (!) 107   22 0125 (!) 55   22 0120 (!) 54   22 0115 72   22 0110 99   22 0105 (!) 54   22 0100 (!) 52     Patient Vitals for the past 8 hrs:   Resp   22 0500 21   22 0430 22   22 0400 26   22 0330 19   22 0300 23   22 0230 17   22 0200 28   22 0140 29   22 0135 24   22 0130 29   22 0125 26   22 0120 23   22 0115 25   22 0110 24   22 0105 27   22 0100 22     Patient Vitals for the past 8 hrs:   BP   22 0500 (!) 143/61   22 0430 (!) 143/59   22 0400 138/64   22 0330 135/68   22 0300 (!) 142/63   22 0230 (!) 147/58   22 0200 (!) 158/75   22 0140 (!) 141/53   22 0135 (!) 141/57   02/20/22 0130 (!) 155/73   02/20/22 0125 (!) 141/51   02/20/22 0120 (!) 151/54   02/20/22 0115 (!) 143/55   02/20/22 0110 (!) 145/55   02/20/22 0105 (!) 149/54   02/20/22 0100 (!) 150/69       Intake/Output Summary (Last 24 hours) at 2/20/2022 0855  Last data filed at 2/20/2022 3044  Gross per 24 hour   Intake --   Output 850 ml   Net -850 ml     General Appearance: Well developed, well nourished, no acute distress. Ears/Nose/Mouth/Throat:   Normal MM; anicteric. JVP: WNL   Resp:   Lungs few basal crackles bilaterally. Nl resp effort. Cardiovascular:  RRR, S1, S2 normal, no new murmur. No gallop or rub. Abdomen:   Soft, non-tender, bowel sounds are present. Extremities: No edema bilaterally. Skin:  Neuro: Warm and dry. A/O x3, grossly nonfocal       cath site intact w/o hematoma or new bruit; distal pulse unchanged  Yes   Data Review:     Telemetry independently reviewed x sinus x 2:1 AVB  parox afib  NSVT     ECG independently reviewed  NSR  afib  no significant changes  NSST-Tw chgs     x no new ECG provided for review   Lab results reviewed as noted below. Current medications reviewed as noted below. No results for input(s): PH, PCO2, PO2 in the last 72 hours. No results for input(s): CPK, CKMB, CKNDX, TROIQ in the last 72 hours.   Recent Labs     02/20/22  0431 02/19/22  1939 02/19/22  1803     --  137   K 3.5  --  4.6     --  108   CO2 16*  --  20*   BUN 33*  --  32*   CREA 1.08*  --  1.13*   GFRAA >60  --  57*   *  --  123*   PHOS 4.1  --   --    CA 8.9  --  9.0   ALB  --   --  3.5   WBC 12.9* 12.5* 12.2*   HGB 10.8* 11.0* 10.6*   HCT 32.7* 34.0* 33.1*    350 331     No results found for: CHOL, CHOLX, CHLST, CHOLV, HDL, HDLP, LDL, LDLC, DLDLP, TGLX, TRIGL, TRIGP, CHHD, CHHDX  Recent Labs     02/19/22  1803   *   TP 7.9   ALB 3.5   GLOB 4.4*     Recent Labs     02/19/22  1939   APTT >130.0*      No components found for: [place]    Current Facility-Administered Medications   Medication Dose Route Frequency    potassium chloride (K-DUR, KLOR-CON M20) SR tablet 40 mEq  40 mEq Oral ONCE    aspirin chewable tablet 81 mg  81 mg Oral DAILY    ticagrelor (BRILINTA) tablet 90 mg  90 mg Oral Q12H    rosuvastatin (CRESTOR) tablet 20 mg  20 mg Oral QHS    losartan (COZAAR) tablet 50 mg  50 mg Oral DAILY    nitroglycerin (NITROBID) 2 % ointment 1 Inch  1 Inch Topical BID PRN    furosemide (LASIX) injection 40 mg  40 mg IntraVENous DAILY        Jenna Mensah MD

## 2022-02-20 NOTE — PROGRESS NOTES
[facility]    ICU TEAM Progress Note    Name: Kimberly Colon   : 1950   MRN: [id number]   Date: 2022      Subjective:   Progress Note: 2022      Reason for ICU Admission: s/p temp pacemaker     Overnight Events: cardiac cath and temp pacemaker insertion    HPI: Stuart Mccurdy is a 66 y.o. female with a past medical history significant for hypertension who presents to the ED with worsening shortness of breath. Patient states that she began to have symptoms of a slow heart rate and occasional shortness of breath back in  when she was started on nifedipine for her blood pressure. Her symptoms began to worsen in January and her physician instructed her to cut back to 1 tab a day from 2 tabs. She also states that she has been taking her heart rate at home and sometimes drops into the 40s. She states that she will often get lightheaded and dizzy and shortness of breath on exertion. However, over the past week her shortness of breath progressed and she is now short of breath at rest.  She does have conversational dyspnea. She did have a appointment to see the cardiologist but was not till next month and she cannot wait that long due to her worsening symptoms. She states that she is unable to lie flat without becoming short of breath. She also has frequent episodes of lightheadedness and dizziness. She did have one isolated episode of chest pain that was located on the left side today but that has since resolved. She denies any fevers or chills. No nausea vomiting or diarrhea. She states that she has never had anything like this before starting her medication in      She denies any swelling in her lower extremities     Patient's  and nephew are at bedside during the encounter     In the ED, she was found to have an elevated troponin with abnormal EKG. Cardiology was called and she is going to undergo cardiac catheterization this evening.   She has placed on supplemental oxygen as well. She was given full dose aspirin and a nitro patch and placed on a heparin drip\" (Dr. Cristy Owen MD). Transferred to the ICU s/p cath and temp pacemaker        POD:Day of Surgery    S/P: Procedure(s):  LEFT HEART CATH / CORONARY ANGIOGRAPHY  PERCUTANEOUS CORONARY INTERVENTION  ANGIOPLASTY CORONARY  INSERT STENT ANAYA CORONARY  LEFT VENTRICULOGRAPHY  INSERT TEMPORARY PACEMAKER    Active Problem List:     Problem List  Date Reviewed: 9/8/2020          Codes Class    NSTEMI (non-ST elevated myocardial infarction) ([facility] [address] ICD-10-CM: I21.4  ICD-9-CM: 410.70         [place] ICD-10-CM: K80.20  ICD-9-CM: 574.20               Past Medical History:      has a past medical history of Arthritis, Colon polyps, Hypertension, IBS (irritable bowel syndrome), Nausea & vomiting, and Other ill-defined conditions(799.89). She has no past medical history of Unspecified adverse effect of anesthesia. Past Surgical History:      has a past surgical history that includes hx mohs procedure; pr abdomen surgery proc unlisted (3/10); hx cholecystectomy (09/06/13); hx appendectomy; hx hysterectomy; hx dilation and curettage; hx pelvic laparoscopy; hx other surgical (2010); hx orthopaedic ([id number]); hx orthopaedic; hx tonsillectomy; and colonoscopy (N/A, 9/8/2020). Home Medications:     Prior to Admission medications    Medication Sig Start Date End Date Taking? Authorizing Provider   OTHER Morphine 240mg / Aquaphon 160 mg  Ointment for right knee    Provider, Historical   AZELASTINE/FLUTICASONE (DYMISTA NA) by Nasal route. As needed    Provider, Historical   lidocaine (LIDODERM) 5 %(700 mg/patch) 1 Patch by TransDERmal route every twenty-four (24) hours. As needed for pain    Provider, Historical   lisinopril-hydrochlorothiazide (PRINZIDE, ZESTORETIC) 20-12.5 mg per tablet Take 1 Tab by mouth every morning. Other, MD Brennon       Allergies/Social/Family History:      Allergies   Allergen Reactions    Adhesive Tape Rash     Severe rash if adhesive on for more than one day    Scopolamine Other (comments)     Had scopolamine patch when removed area looked burned and skin red and irritated    Codeine Nausea and Vomiting    Sulfa (Sulfonamide Antibiotics) Nausea and Vomiting    Azithromycin Rash    Ceclor [Cefaclor] Nausea and Vomiting    Trazodone Other (comments)     dizzy    Zolpidem Other (comments)     dizzy      Social History     Tobacco Use    Smoking status: Never Smoker    Smokeless tobacco: Never Used   Substance Use Topics    Alcohol use: No      Family History   Problem Relation Age of Onset    Heart Failure Mother     Heart Attack Father     Diabetes Father     Heart Failure Brother        Review of Systems:     Deferred     Objective:   Vital Signs:  Visit Vitals  BP (!) 155/100 (BP Patient Position: At rest)   Pulse (!) 55   Temp 97.7 °F (36.5 °C)   Resp 20   Ht 5' 5\" (1.651 m)   Wt 69.2 kg (152 lb 8.9 oz)   SpO2 96%   BMI 25.39 kg/m²    O2 Flow Rate (L/min): 2 l/min O2 Device: None (Room air) Temp (24hrs), Av.7 °F (36.5 °C), Min:97.7 °F (36.5 °C), Max:97.7 °F (36.5 °C)           Intake/Output:   No intake or output data in the 24 hours ending 22    Physical Exam:    Physical Exam  Constitutional:       Appearance: Normal appearance. She is normal weight. HENT:      Head: Normocephalic and atraumatic. Nose: Nose normal.      Mouth/Throat:      Mouth: Mucous membranes are moist.      Pharynx: No oropharyngeal exudate or posterior oropharyngeal erythema. Eyes:      Extraocular Movements: Extraocular movements intact. Conjunctiva/sclera: Conjunctivae normal.      Pupils: Pupils are equal, round, and reactive to light. Cardiovascular:      Rate and Rhythm: Normal rate and regular rhythm. Heart sounds: No murmur heard. No friction rub. No gallop.        Comments: Pm at 50 with mAs 5   Pulmonary:      Effort: Pulmonary effort is normal.      Breath sounds: Normal breath sounds. Abdominal:      General: There is no distension. Palpations: Abdomen is soft. Tenderness: There is no abdominal tenderness. Musculoskeletal:         General: Normal range of motion. Cervical back: Normal range of motion. Skin:     General: Skin is warm and dry. Capillary Refill: Capillary refill takes less than 2 seconds. Comments: Right IJ temp pm insertion site c/d/i  Right radial with band in place   Neurological:      General: No focal deficit present. Mental Status: She is alert and oriented to person, place, and time. Psychiatric:         Mood and Affect: Mood normal.         Behavior: Behavior normal.           LABS AND  DATA: Personally reviewed  Recent Labs     02/19/22 1939 02/19/22 1803   WBC 12.5* 12.2*   HGB 11.0* 10.6*   HCT 34.0* 33.1*    331     Recent Labs     02/19/22 1803      K 4.6      CO2 20*   BUN 32*   CREA 1.13*   *   CA 9.0     Recent Labs     02/19/22 1803   *   TP 7.9   ALB 3.5   GLOB 4.4*     Recent Labs     02/19/22 1939   APTT >130.0*      No results for input(s): PHI, PCO2I, PO2I, FIO2I in the last 72 hours. No results for input(s): CPK, CKMB, TROIQ, BNPP in the last 72 hours. Hemodynamics:   PAP:   CO:     Wedge:   CI:     CVP:    SVR:       PVR:       Ventilator Settings:  Mode Rate Tidal Volume Pressure FiO2 PEEP                    Peak airway pressure:      Minute ventilation:          MEDS: Reviewed    Chest X-Ray: personally reviewed and report checked     FINDINGS: Single view(s) of the chest. The heart is on the upper limits of  normal for size. There is diffuse interstitial prominence that has increased.   The patient is status post bilateral breast implants.     IMPRESSION  Diffuse increased interstitial prominence which can be seen with edema and  atypical (viral) infection.     Cath results:  Brief procedure Result:   Single vessel CAD involving critical lesion of mid LAD but with SIMON 3 flow   Successful PCI of mid LAD with ANAYA x1  Successful placement of temporary pacemaker through right IJ approach (at HR of 50 and amp of 5)   EF 30-35% with apical hypokinesis     Assessment & Plan:   Heart block, type 2 with LBBB: s/p temp pacemaker. Currently set at 48 with mAs 5.   - Hold beta blocker  - consult to EP  - plan for ppm on Monday    NSTEMI r/t CAD: Status post cardiac catheterization demonstrating single-vessel CAD involving critical lesion of the mid LAD. Patient received drug-eluting stent to the mid LAD. -Continue aspirin and Brilinta  -Start statin therapy  -Resume losartan as blood pressure allows  -Follow-up cardiology recommendations  -Continuous telemetry monitoring    Heart failure with reduced EF: LV gram showed reduced EF of 30 to 35% during cardiac catheterization.  -Check echocardiogram in a.m.  -Optimize volume status  -Daily weights  -Strict I's and O's    Hypertension, essential:   -Continue losartan  -Add spironolactone as blood pressure allows        ICU Comprehensive Plan of Care:   Plans for this Shift:   1. Cath recovery protocol  2. Supportive care   3. Echo in am     Multidisciplinary Rounds Completed:  Pending    ABCDEF Bundle/Checklist  Pain Medications: None  Target RASS: 0 - Alert & Calm - Spontaneously pays attention to caregiver  Sedation Medications: None  CAM-ICU:  Negative  Mobility: Good   PT/OT: NI   Restraints: None needed at this time  Discussed Plan of Care (goals of care): No  Addressed Code Status: Full Code    CARDIOVASCULAR  Cardiac Gtts: None  SBP Goal of: < 160 mmHg  MAP Goal of: > 65 mmHg  Transfusion Trigger (Hgb): <7 g/dL    RESPIRATORY  Vent Goals:    Incentive spirometry  DVT Prophylaxis (if no, list reason): SCD's or Sequential Compression Device   SPO2 Goal: > 92%  Pulmonary toilet: Incentive Spirometry     GI/  Montanez Catheter Present: Yes  GI Prophylaxis: NI  Nutrition: Pending   IVFs: PIV  Bowel Movement: Pending  Bowel Regimen: None needed at this time  Insulin: NI    ANTIBIOTICS  Antibiotics:  none     T/L/D  Tubes: None  Lines: Peripheral IV  Drains: Montanez Catheter    SPECIAL EQUIPMENT  Temporary Pacemaker    DISPOSITION  Stay in ICU    CRITICAL CARE CONSULTANT NOTE  I had a face to face encounter with the patient, reviewed and interpreted patient data including clinical events, labs, images, vital signs, I/O's, and examined patient. I have discussed the case and the plan and management of the patient's care with the consulting services, the bedside nurses and the respiratory therapist.      NOTE OF PERSONAL INVOLVEMENT IN CARE   This patient has a high probability of imminent, clinically significant deterioration, which requires the highest level of preparedness to intervene urgently. I participated in the decision-making and personally managed or directed the management of the following life and organ supporting interventions that required my frequent assessment to treat or prevent imminent deterioration. I personally spent 45 minutes of critical care time. This is time spent at this critically ill patient's bedside actively involved in patient care as well as the coordination of care and discussions with the patient's family. This does not include any procedural time which has been billed separately.     Heavenly Betts NP  [facility]  2/19/2022

## 2022-02-20 NOTE — PROGRESS NOTES
Brief Procedure Note:         Indication: NSTEMI, Type 2 AV block, 2:1 conduction     Procedure:   LHC, Cors,   Rt radial artery access   Rt IJ access, US guided   PCI of LAD   Temp PM through tight IJ approach   LV gram     Complications: None   Blood loss: Minimal   Condition: Stable     Brief procedure Result:   Single vessel CAD involving critical lesion of mid LAD but with SIMON 3 flow   Successful PCI of mid LAD with ANAYA x1  Successful placement of temporary pacemaker through right IJ approach (at HR of 50 and amp of 5)   EF 30-35% with apical hypokinesis     Recommendations:     Continue aspirin and brilinta   Statin   Hold b blocker due to bradycardia   Diuresis with IV lasix as tolerates   Resume losartan   Add spironolactone as BP tolerates     LV gram showed reduced EF- 30-35%, but appears to be stress cardiomyopathy pattern - though it could not be labeled as stress CMP as she has LAD disease. Will check 2d echocardiogram.      Her bradycardia is present since last several weeks and she started having chest pain today. Her bradycardia is NOT related to LAD disease, she has conduction system disease with type 2 AV block and 2:1 AV block and LBBB pattern. She will need EP eval and PPM or BiV pacemaker. Echo to reassess LVEF. Discussed with Dr Gracie Moyer who will see patient tomorrow.      Full note and recommendations to follow. '

## 2022-02-20 NOTE — PROGRESS NOTES
2120 TRANSFER - IN REPORT:    Verbal report received from LINDA Burgos(name) on [address]  being received from Cath lab(unit) for routine progression of care      Report consisted of patients Situation, Background, Assessment and   Recommendations(SBAR). Information from the following report(s) SBAR, Kardex, ED Summary, Intake/Output, MAR and Recent Results was reviewed with the receiving nurse. Opportunity for questions and clarification was provided. 2200 Assessment and CHG bath completed upon patients arrival to unit and care assumed. 9 total amount of air in TR band. Start removal time 0100 .     2ml withdrawn. 0102. No bleeding, no hematoma. VSS  2ml withdrawn. 0107. No bleeding, no hematoma. VSS  2ml withdrawn. 0112. No bleeding, no hematoma. VSS  2ml withdrawn. 0118. No bleeding, no hematoma. VSS  1ml withdrawn. 8255. No bleeding, no hematoma. VSS    0130 TR band removed. Tegaderm placed. No bleeding, No hematoma. VSS. Will continue to monitor per protocol. 0430 Reassessment complete. Bedside and Verbal shift change report given to LINDA George (oncoming nurse) by Terri Mccoy (offgoing nurse). Report included the following information SBAR, Kardex, ED Summary, Intake/Output, MAR and Recent Results.

## 2022-02-20 NOTE — PROGRESS NOTES
Cath Lab team paged as requested by Dr. Malorie Pineda and all three have responded. ER Veronique MCKEON notified.

## 2022-02-21 ENCOUNTER — APPOINTMENT (OUTPATIENT)
Dept: GENERAL RADIOLOGY | Age: 72
DRG: 242 | End: 2022-02-21
Attending: INTERNAL MEDICINE
Payer: MEDICARE

## 2022-02-21 ENCOUNTER — TRANSCRIBE ORDER (OUTPATIENT)
Dept: CARDIAC REHAB | Age: 72
End: 2022-02-21

## 2022-02-21 DIAGNOSIS — I21.4 ACUTE MYOCARDIAL INFARCTION, SUBENDOCARDIAL INFARCTION, INITIAL EPISODE OF CARE (HCC): Primary | ICD-10-CM

## 2022-02-21 LAB
ANION GAP SERPL CALC-SCNC: 11 MMOL/L (ref 5–15)
BASOPHILS # BLD: 0 K/UL (ref 0–0.1)
BASOPHILS NFR BLD: 0 % (ref 0–1)
BUN SERPL-MCNC: 45 MG/DL (ref 6–20)
BUN/CREAT SERPL: 28 (ref 12–20)
CALCIUM SERPL-MCNC: 8.7 MG/DL (ref 8.5–10.1)
CHLORIDE SERPL-SCNC: 109 MMOL/L (ref 97–108)
CO2 SERPL-SCNC: 19 MMOL/L (ref 21–32)
CREAT SERPL-MCNC: 1.58 MG/DL (ref 0.55–1.02)
DIFFERENTIAL METHOD BLD: ABNORMAL
EOSINOPHIL # BLD: 0.2 K/UL (ref 0–0.4)
EOSINOPHIL NFR BLD: 2 % (ref 0–7)
ERYTHROCYTE [DISTWIDTH] IN BLOOD BY AUTOMATED COUNT: 13.8 % (ref 11.5–14.5)
GLUCOSE SERPL-MCNC: 111 MG/DL (ref 65–100)
HCT VFR BLD AUTO: 32.1 % (ref 35–47)
HGB BLD-MCNC: 10.7 G/DL (ref 11.5–16)
IMM GRANULOCYTES # BLD AUTO: 0.1 K/UL (ref 0–0.04)
IMM GRANULOCYTES NFR BLD AUTO: 1 % (ref 0–0.5)
LYMPHOCYTES # BLD: 2.3 K/UL (ref 0.8–3.5)
LYMPHOCYTES NFR BLD: 19 % (ref 12–49)
MAGNESIUM SERPL-MCNC: 2.4 MG/DL (ref 1.6–2.4)
MCH RBC QN AUTO: 26.9 PG (ref 26–34)
MCHC RBC AUTO-ENTMCNC: 33.3 G/DL (ref 30–36.5)
MCV RBC AUTO: 80.7 FL (ref 80–99)
MONOCYTES # BLD: 1.1 K/UL (ref 0–1)
MONOCYTES NFR BLD: 9 % (ref 5–13)
NEUTS SEG # BLD: 8.4 K/UL (ref 1.8–8)
NEUTS SEG NFR BLD: 69 % (ref 32–75)
NRBC # BLD: 0 K/UL (ref 0–0.01)
NRBC BLD-RTO: 0 PER 100 WBC
PHOSPHATE SERPL-MCNC: 4.2 MG/DL (ref 2.6–4.7)
PLATELET # BLD AUTO: 372 K/UL (ref 150–400)
PMV BLD AUTO: 9.4 FL (ref 8.9–12.9)
POTASSIUM SERPL-SCNC: 4.3 MMOL/L (ref 3.5–5.1)
RBC # BLD AUTO: 3.98 M/UL (ref 3.8–5.2)
SODIUM SERPL-SCNC: 139 MMOL/L (ref 136–145)
WBC # BLD AUTO: 12.1 K/UL (ref 3.6–11)

## 2022-02-21 PROCEDURE — C1892 INTRO/SHEATH,FIXED,PEEL-AWAY: HCPCS | Performed by: INTERNAL MEDICINE

## 2022-02-21 PROCEDURE — 77030031139 HC SUT VCRL2 J&J -A: Performed by: INTERNAL MEDICINE

## 2022-02-21 PROCEDURE — 74011250637 HC RX REV CODE- 250/637: Performed by: INTERNAL MEDICINE

## 2022-02-21 PROCEDURE — 77030018729 HC ELECTRD DEFIB PAD CARD -B: Performed by: INTERNAL MEDICINE

## 2022-02-21 PROCEDURE — 83735 ASSAY OF MAGNESIUM: CPT

## 2022-02-21 PROCEDURE — C1894 INTRO/SHEATH, NON-LASER: HCPCS | Performed by: INTERNAL MEDICINE

## 2022-02-21 PROCEDURE — 74011250636 HC RX REV CODE- 250/636: Performed by: INTERNAL MEDICINE

## 2022-02-21 PROCEDURE — 99152 MOD SED SAME PHYS/QHP 5/>YRS: CPT | Performed by: INTERNAL MEDICINE

## 2022-02-21 PROCEDURE — C1900 LEAD, CORONARY VENOUS: HCPCS | Performed by: INTERNAL MEDICINE

## 2022-02-21 PROCEDURE — 02H43JZ INSERTION OF PACEMAKER LEAD INTO CORONARY VEIN, PERCUTANEOUS APPROACH: ICD-10-PCS | Performed by: INTERNAL MEDICINE

## 2022-02-21 PROCEDURE — 36415 COLL VENOUS BLD VENIPUNCTURE: CPT

## 2022-02-21 PROCEDURE — 99153 MOD SED SAME PHYS/QHP EA: CPT | Performed by: INTERNAL MEDICINE

## 2022-02-21 PROCEDURE — 33208 INSRT HEART PM ATRIAL & VENT: CPT | Performed by: INTERNAL MEDICINE

## 2022-02-21 PROCEDURE — 74011000250 HC RX REV CODE- 250: Performed by: INTERNAL MEDICINE

## 2022-02-21 PROCEDURE — C2621 PMKR, OTHER THAN SING/DUAL: HCPCS | Performed by: INTERNAL MEDICINE

## 2022-02-21 PROCEDURE — 0JH606Z INSERTION OF PACEMAKER, DUAL CHAMBER INTO CHEST SUBCUTANEOUS TISSUE AND FASCIA, OPEN APPROACH: ICD-10-PCS | Performed by: INTERNAL MEDICINE

## 2022-02-21 PROCEDURE — 77030002996 HC SUT SLK J&J -A: Performed by: INTERNAL MEDICINE

## 2022-02-21 PROCEDURE — C1769 GUIDE WIRE: HCPCS | Performed by: INTERNAL MEDICINE

## 2022-02-21 PROCEDURE — 65660000001 HC RM ICU INTERMED STEPDOWN

## 2022-02-21 PROCEDURE — 74011000636 HC RX REV CODE- 636: Performed by: INTERNAL MEDICINE

## 2022-02-21 PROCEDURE — 84100 ASSAY OF PHOSPHORUS: CPT

## 2022-02-21 PROCEDURE — 77030039266 HC ADH SKN EXOFIN S2SG -A: Performed by: INTERNAL MEDICINE

## 2022-02-21 PROCEDURE — 74011250637 HC RX REV CODE- 250/637: Performed by: STUDENT IN AN ORGANIZED HEALTH CARE EDUCATION/TRAINING PROGRAM

## 2022-02-21 PROCEDURE — 77010033678 HC OXYGEN DAILY

## 2022-02-21 PROCEDURE — C1751 CATH, INF, PER/CENT/MIDLINE: HCPCS | Performed by: INTERNAL MEDICINE

## 2022-02-21 PROCEDURE — 74011000272 HC RX REV CODE- 272: Performed by: INTERNAL MEDICINE

## 2022-02-21 PROCEDURE — 71045 X-RAY EXAM CHEST 1 VIEW: CPT

## 2022-02-21 PROCEDURE — 85025 COMPLETE CBC W/AUTO DIFF WBC: CPT

## 2022-02-21 PROCEDURE — 80048 BASIC METABOLIC PNL TOTAL CA: CPT

## 2022-02-21 PROCEDURE — C1898 LEAD, PMKR, OTHER THAN TRANS: HCPCS | Performed by: INTERNAL MEDICINE

## 2022-02-21 PROCEDURE — 2709999900 HC NON-CHARGEABLE SUPPLY: Performed by: INTERNAL MEDICINE

## 2022-02-21 DEVICE — LEAD PCMKR TENDRIL 58CM --: Type: IMPLANTABLE DEVICE | Status: FUNCTIONAL

## 2022-02-21 DEVICE — LEAD PCMKR TENDRIL 52CM --: Type: IMPLANTABLE DEVICE | Status: FUNCTIONAL

## 2022-02-21 DEVICE — IMPLANTABLE DEVICE: Type: IMPLANTABLE DEVICE | Status: FUNCTIONAL

## 2022-02-21 DEVICE — LEAD PACE 47FR L86CM 60MM SPACE L HRT 16 CRV TRAD S SHP: Type: IMPLANTABLE DEVICE | Status: FUNCTIONAL

## 2022-02-21 RX ORDER — FENTANYL CITRATE 50 UG/ML
INJECTION, SOLUTION INTRAMUSCULAR; INTRAVENOUS AS NEEDED
Status: DISCONTINUED | OUTPATIENT
Start: 2022-02-21 | End: 2022-02-21 | Stop reason: HOSPADM

## 2022-02-21 RX ORDER — ACETAMINOPHEN 325 MG/1
650 TABLET ORAL
Status: DISCONTINUED | OUTPATIENT
Start: 2022-02-21 | End: 2022-02-23 | Stop reason: HOSPADM

## 2022-02-21 RX ORDER — MIDAZOLAM HYDROCHLORIDE 1 MG/ML
INJECTION, SOLUTION INTRAMUSCULAR; INTRAVENOUS AS NEEDED
Status: DISCONTINUED | OUTPATIENT
Start: 2022-02-21 | End: 2022-02-21 | Stop reason: HOSPADM

## 2022-02-21 RX ORDER — SODIUM CHLORIDE 0.9 % (FLUSH) 0.9 %
5-40 SYRINGE (ML) INJECTION AS NEEDED
Status: DISCONTINUED | OUTPATIENT
Start: 2022-02-21 | End: 2022-02-23 | Stop reason: HOSPADM

## 2022-02-21 RX ORDER — SODIUM CHLORIDE 0.9 % (FLUSH) 0.9 %
5-40 SYRINGE (ML) INJECTION EVERY 8 HOURS
Status: DISCONTINUED | OUTPATIENT
Start: 2022-02-21 | End: 2022-02-23 | Stop reason: HOSPADM

## 2022-02-21 RX ORDER — HYDROCODONE BITARTRATE AND ACETAMINOPHEN 5; 325 MG/1; MG/1
1 TABLET ORAL
Status: DISCONTINUED | OUTPATIENT
Start: 2022-02-21 | End: 2022-02-23 | Stop reason: HOSPADM

## 2022-02-21 RX ORDER — LIDOCAINE HYDROCHLORIDE AND EPINEPHRINE 10; 10 MG/ML; UG/ML
INJECTION, SOLUTION INFILTRATION; PERINEURAL AS NEEDED
Status: DISCONTINUED | OUTPATIENT
Start: 2022-02-21 | End: 2022-02-21 | Stop reason: HOSPADM

## 2022-02-21 RX ORDER — HEPARIN SODIUM 200 [USP'U]/100ML
INJECTION, SOLUTION INTRAVENOUS
Status: COMPLETED | OUTPATIENT
Start: 2022-02-21 | End: 2022-02-21

## 2022-02-21 RX ORDER — METOPROLOL TARTRATE 25 MG/1
25 TABLET, FILM COATED ORAL 2 TIMES DAILY
Status: DISCONTINUED | OUTPATIENT
Start: 2022-02-21 | End: 2022-02-22

## 2022-02-21 RX ORDER — LOSARTAN POTASSIUM 25 MG/1
25 TABLET ORAL DAILY
Status: DISCONTINUED | OUTPATIENT
Start: 2022-02-21 | End: 2022-02-23 | Stop reason: HOSPADM

## 2022-02-21 RX ORDER — METOPROLOL TARTRATE 5 MG/5ML
5 INJECTION INTRAVENOUS
Status: DISCONTINUED | OUTPATIENT
Start: 2022-02-21 | End: 2022-02-23 | Stop reason: HOSPADM

## 2022-02-21 RX ADMIN — TICAGRELOR 90 MG: 90 TABLET ORAL at 22:19

## 2022-02-21 RX ADMIN — Medication 1 AMPULE: at 22:19

## 2022-02-21 RX ADMIN — METOPROLOL TARTRATE 25 MG: 25 TABLET, FILM COATED ORAL at 17:08

## 2022-02-21 RX ADMIN — ACETAMINOPHEN 325MG 650 MG: 325 TABLET ORAL at 18:13

## 2022-02-21 RX ADMIN — SODIUM CHLORIDE, PRESERVATIVE FREE 10 ML: 5 INJECTION INTRAVENOUS at 18:12

## 2022-02-21 RX ADMIN — Medication 1 AMPULE: at 13:09

## 2022-02-21 RX ADMIN — TICAGRELOR 90 MG: 90 TABLET ORAL at 08:53

## 2022-02-21 RX ADMIN — ROSUVASTATIN 20 MG: 20 TABLET, FILM COATED ORAL at 22:19

## 2022-02-21 RX ADMIN — SODIUM CHLORIDE, PRESERVATIVE FREE 10 ML: 5 INJECTION INTRAVENOUS at 22:19

## 2022-02-21 RX ADMIN — LOSARTAN POTASSIUM 25 MG: 25 TABLET, FILM COATED ORAL at 08:53

## 2022-02-21 RX ADMIN — ASPIRIN 81 MG CHEWABLE TABLET 81 MG: 81 TABLET CHEWABLE at 08:31

## 2022-02-21 RX ADMIN — DOCUSATE SODIUM 50 MG AND SENNOSIDES 8.6 MG 1 TABLET: 8.6; 5 TABLET, FILM COATED ORAL at 08:31

## 2022-02-21 NOTE — PROGRESS NOTES
1630  Returned from EP lab. Left chest incision noted, with glue. np c/o pain at this time. 1845  Right neck sheath removed. Dressing applied.

## 2022-02-21 NOTE — PROGRESS NOTES
1900 - Verbal shift change report given to LINDA Wright (oncoming nurse) by Jamar Anand RN (offgoing nurse). Report included the following information SBAR, Kardex, Intake/Output, MAR, Recent Results, Cardiac Rhythm 2nd degree block, and dual skin assessment at the bedside. 2000 - Assessment complete. Receiving oxygen via Nasal cannula 2L. Lines:  PIV: x2, R IJ sheath TVP VVI 45/10ma     0000 - Reassessment complete. VSS.    0400 - Reassessment complete. VSS. Labs sent. 0615 CHG Bath complete. Bedside and Verbal shift change report given to LINDA George (oncoming nurse) by Yumiko Young (offgoing nurse). Report included the following information SBAR, Kardex, ED Summary, Intake/Output, MAR and Recent Results.

## 2022-02-21 NOTE — CARDIO/PULMONARY
Cardiac Rehab Note: chart review/referral 02/21/2022 02/21/2022 Patient status post cath for nstemi with stent    Smoking history assessed. Patient is a non smoker. Smoking Cessation Program link has not been added to the AVS.     EF 20%  on 02/20/2022 per echo. CAD education folder, with heart heathy diet, warning signs, heart facts, catheterization brochure, and out patient cardiac rehab program provided to [employer] on 02/21/2022. Educated using teach back method. Reviewed CAD diagnosis definition and purpose of intervention. Discussed risk factors for CAD to include the following: family history, elevated BMI, hyperlipidemia, hypertension, diabetes, and stress. Discussed Heart Healthy/Low Sodium (less than 2000 mg) diet. Reviewed the importance of medication compliance, follow up appointments with cardiologist, signs and symptoms of angina, and what to report to physician after discharge. Emphasized the value of cardiac rehab. Discussed Cardiac Rehab Program format, benefits, and encouraged enrollment to assist with risk modification and management. Initial Cardiac Rehab Program intake appointment scheduled for 03/23/2022 @8:00am and appointment information is on the AVS. Patient provided orientation packet, instructed to complete packet a couple days prior to appointment, wear gym appropriate clothing and shoes, and bring current list of medications. Patient is to call if unable to keep appointment, need to reschedule or additional questions. [employer] verbalized understanding with questions answered.   Susan Silva RN

## 2022-02-21 NOTE — PROGRESS NOTES
S/p [employer] BIV pacemaker implant at the left chest using transvenous leads, full note in cardiology section.

## 2022-02-21 NOTE — PROGRESS NOTES
I discussed the potential benefits, risks, and alternatives to a biventricular pacemaker with her and she agrees to proceed. She currently is in 2:1 AV block with LBBB, HR 55 bpm.  I am hopeful that her EF will improve with time, she'll need an echo in 3 months to reassess her candidacy for a BIV-ICD upgrade. In the meanwhile, I'd get her set up with a LIFEVEST. All questions answered for her and her .

## 2022-02-21 NOTE — PROGRESS NOTES
Patient seen and examined post pacemaker placement.  at bedside. She is doing well overall, except pain at surgical site. Vitals noted. Beta-blockers initiated. [facility] Report given to Dr. Alma Silva.     Olivia Mendoza MD, FCCP, FCCM, ATSF, FACP, [place]  Interventional Pulmonology/Critical [address] [place] [address]

## 2022-02-21 NOTE — PROGRESS NOTES
Care Management:    Transition of Care Plan:    RUR: 12%  Disposition:Home with  and follow up with PCP. Follow up appointments:PCP and cardiologist.   DME needed:She has a cane at home. May discharge home with life vest.... Transportation at [address] or means to access home:        IM Medicare Letter: Will need second IM letter at discharge. Is patient a BCPI-A Bundle:      N/A       Is patient a  and connected with the [place]? N/a  Caregiver Contact: Mansoor Wellton  Discharge Caregiver contacted prior to discharge? Patient and her   Care Conference needed?:    Not anticipated . Reason for Admission:   1. NSTEMI  2. Second degree type 2 AV block   3. LBBB  4. Heart failure      Cath 2/19/22:  Successful PCI of mid LAD with ANAYA x1    Pacemaker placed today. RUR Score:   12%                  Plan for utilizing home health:          PCP: First and Last name:  Emma Johnson MD     Name of Practice: Dr Frantz Biggs   Are you a current patient: Yes   Approximate date of last visit: 9-10 months ago. Can you participate in a virtual visit with your PCP:                   YES  Current Advanced Directive/Advance Care Plan: Full Code    Healthcare Decision Maker:   Brent Gomez -             Care Management Interventions  PCP Verified by CM: Yes  Mode of Transport at Discharge: Other (see comment)  Support Systems: Spouse/Significant Other  Confirm Follow Up Transport: Family  Discharge Location  Patient Expects to be Discharged to[de-identified] Home with family assistance       CM spoke with  and able to confirm demographics. Patient lives with her  in a one story home. She is independent at baseline including driving. She has a cane. She may discharge home with a life vest. She is active with her PCP. She uses [address]. Chart reviewed and we will cont to follow for discharge needs as appropriate. Colletta Neptune RN [facility]

## 2022-02-21 NOTE — PROGRESS NOTES
Cardiology Progress Note  2/21/2022     Admit Date: 2/19/2022  Admit Diagnosis: NSTEMI (non-ST elevated myocardial infarction) ([facility] [address] [I21.4]  CC: none currently  Cardiac Assessment/Plan (per Dr Cordell Francois):     1. NSTEMI  2. Second degree type 2 AV block   3. LBBB  4. Heart failure, unknown EF  5. HTN  6. FH of CAD     Cath 2/19/22:  Single vessel CAD involving critical lesion of mid LAD but with SIMON 3 flow   Successful PCI of mid LAD with ANAYA x1  Successful placement of temporary pacemaker through right IJ approach (at HR of 50 and amp of 5)   EF 30-35% with apical hypokinesis      Recommendations:      Continue aspirin and brilinta   Statin   Hold b blocker due to bradycardia   Diuresis with IV lasix as tolerates   Resume losartan   Add spironolactone as BP tolerates      LV gram showed reduced EF- 30-35%, but appears to be stress cardiomyopathy pattern - though it could not be labeled as stress CMP as she has LAD disease. Will check 2d echocardiogram.       Her bradycardia is present since last several weeks and she started having chest pain today. Her bradycardia is NOT related to LAD disease, she has conduction system disease with type 2 AV block and 2:1 AV block and LBBB pattern. She will need EP eval and PPM or BiV pacemaker. Echo to reassess LVEF.     2/20: No CP/dyspnea at rest.  BPs 130-150; HR 50s; 2:1 AVblock, intermittent Vpacing after dropped P; 95% 2L; Good UOP; PM threshold 1. WBC 13 (c/w stress shift); Hg 10.8; C 3.5; Cr 1.08; CO2 16. Trop 2.7, 5.1, 4.5 ng/ml    Cardiac meds: asa; brilinta 90 bid; lasix 40 IV qday; losartan 50; crestor 20;     Changed PM to rate 45 and output 10 (to see if still having type 2 AVB). IV lasix today; add aldactone 25 tomorrow (extra lasix as needed). NPO p MN for possible PPM  Echo pending. Obviously no bblocker for now. Echo 2/20/21:   Left Ventricle: Left ventricle size is normal. Normal wall thickness.  Yqb-ad-fyllaf anterior/septal, apical, & distal inferior AK. EF  is 20%. :   BPs 110-130; HR 50s; 2:1 AV block; 95% 2L. Good UOP  Hg 10.7; Cr 1.58; BUN 45;     Cardiac meds: asa; brilinta; losartan 25 (down from 50; stopped aldactone/kcl). D/W Dr Dion Campbell @ possible PM, ? Biv. Will need LifeVest for now. For other plans, see orders.       High complexity decision making was performed  X Yes   High-risk of decompensation with multiple organ involvement X Yes         Hospital problem list     Active Hospital Problems    Diagnosis Date Noted    NSTEMI (non-ST elevated myocardial infarction) ([facility] [address] 2022        Subjective: [place] reports       Chest pain X none  consistent with:  Non-cardiac CP         Atypical CP     None now  On going  Anginal CP     Dyspnea  none  at rest  with exertion        x improved  unchanged  worse              PND X none  overnight       Orthopnea X none  improved  unchanged  worse   Presyncope X none  improved  unchanged  worse     Ambulated in hallway without symptoms   Yes   Ambulated in room without symptoms  Yes   Objective:     Physical Exam:  Overall VSSAF;    Visit Vitals  BP (!) 116/51   Pulse (!) 51   Temp 97.9 °F (36.6 °C)   Resp 21   Ht 5' 5\" (1.651 m)   Wt 69.2 kg (152 lb 8.9 oz)   SpO2 95%   BMI 25.39 kg/m²     Temp (24hrs), Av.1 °F (36.7 °C), Min:97.7 °F (36.5 °C), Max:98.6 °F (37 °C)    Patient Vitals for the past 8 hrs:   Pulse   22 0800 (!) 51   22 0700 (!) 49   22 0600 (!) 58   22 0500 60   22 0400 (!) 57   22 0300 63   22 0200 (!) 57   22 0100 (!) 47     Patient Vitals for the past 8 hrs:   Resp   22 0800 21   22 0700 23   22 0600 16   22 0500 21   22 0400 24   22 0300 20   22 0200 22   22 0100 23     Patient Vitals for the past 8 hrs:   BP   22 0800 (!) 116/51   22 0700 (!) 128/51   22 0600 (!) 123/49   22 0500 (!) 133/50   22 0400 (!) 124/42 02/21/22 0300 129/64   02/21/22 0200 (!) 120/38   02/21/22 0100 122/63       Intake/Output Summary (Last 24 hours) at 2/21/2022 9889  Last data filed at 2/21/2022 7019  Gross per 24 hour   Intake 780 ml   Output 1650 ml   Net -870 ml     General Appearance: Well developed, well nourished, no acute distress. Ears/Nose/Mouth/Throat:   Normal MM; anicteric. JVP: WNL   Resp:   Lungs few basal crackles bilaterally. Nl resp effort. Cardiovascular:  RRR, S1, S2 normal, no new murmur. No gallop or rub. Abdomen:   Soft, non-tender, bowel sounds are present. Extremities: No edema bilaterally. Skin:  Neuro: Warm and dry. A/O x3, grossly nonfocal       cath site intact w/o hematoma or new bruit; distal pulse unchanged  Yes   Data Review:     Telemetry independently reviewed x sinus x 2:1 AVB  parox afib  NSVT     ECG independently reviewed  NSR  afib  no significant changes  NSST-Tw chgs     x no new ECG provided for review   Lab results reviewed as noted below. Current medications reviewed as noted below. No results for input(s): PH, PCO2, PO2 in the last 72 hours. No results for input(s): CPK, CKMB, CKNDX, TROIQ in the last 72 hours. Recent Labs     02/21/22  0437 02/20/22  1703 02/20/22  0431 02/19/22  1939 02/19/22  1803 02/19/22  1803    137 136  --    < > 137   K 4.3 4.3 3.5  --    < > 4.6   * 111* 108  --    < > 108   CO2 19* 21 16*  --    < > 20*   BUN 45* 42* 33*  --    < > 32*   CREA 1.58* 1.52* 1.08*  --    < > 1.13*   GFRAA 39* 41* >60  --    < > 57*   * 109* 143*  --    < > 123*   PHOS 4.2  --  4.1  --   --   --    CA 8.7 8.5 8.9  --    < > 9.0   ALB  --   --   --   --   --  3.5   WBC 12.1*  --  12.9* 12.5*   < > 12.2*   HGB 10.7*  --  10.8* 11.0*   < > 10.6*   HCT 32.1*  --  32.7* 34.0*   < > 33.1*     --  327 350   < > 331    < > = values in this interval not displayed.      No results found for: CHOL, CHOLX, CHLST, CHOLV, HDL, HDLP, LDL, LDLC, DLDLP, TGLX, TRIGL, Berton Goodell, [facility]  Recent Labs     02/19/22  1803   *   TP 7.9   ALB 3.5   GLOB 4.4*     Recent Labs     02/19/22  1939   APTT >130.0*      No components found for: [place]    Current Facility-Administered Medications   Medication Dose Route Frequency    spironolactone (ALDACTONE) tablet 25 mg  25 mg Oral DAILY    potassium chloride (K-DUR, KLOR-CON M20) SR tablet 40 mEq  40 mEq Oral BID    spironolactone (ALDACTONE) tablet 25 mg  25 mg Oral DAILY    polyethylene glycol (MIRALAX) packet 17 g  17 g Oral DAILY    senna-docusate (PERICOLACE) 8.6-50 mg per tablet 1 Tablet  1 Tablet Oral DAILY    aspirin chewable tablet 81 mg  81 mg Oral DAILY    ticagrelor (BRILINTA) tablet 90 mg  90 mg Oral Q12H    rosuvastatin (CRESTOR) tablet 20 mg  20 mg Oral QHS    losartan (COZAAR) tablet 50 mg  50 mg Oral DAILY    nitroglycerin (NITROBID) 2 % ointment 1 Inch  1 Inch Topical BID PRN        Carolann Brown MD

## 2022-02-22 LAB
ANION GAP SERPL CALC-SCNC: 8 MMOL/L (ref 5–15)
BUN SERPL-MCNC: 45 MG/DL (ref 6–20)
BUN/CREAT SERPL: 39 (ref 12–20)
CALCIUM SERPL-MCNC: 8.3 MG/DL (ref 8.5–10.1)
CHLORIDE SERPL-SCNC: 111 MMOL/L (ref 97–108)
CO2 SERPL-SCNC: 19 MMOL/L (ref 21–32)
CREAT SERPL-MCNC: 1.16 MG/DL (ref 0.55–1.02)
GLUCOSE SERPL-MCNC: 97 MG/DL (ref 65–100)
POTASSIUM SERPL-SCNC: 4.4 MMOL/L (ref 3.5–5.1)
SODIUM SERPL-SCNC: 138 MMOL/L (ref 136–145)

## 2022-02-22 PROCEDURE — 74011250637 HC RX REV CODE- 250/637: Performed by: STUDENT IN AN ORGANIZED HEALTH CARE EDUCATION/TRAINING PROGRAM

## 2022-02-22 PROCEDURE — 74011250637 HC RX REV CODE- 250/637: Performed by: INTERNAL MEDICINE

## 2022-02-22 PROCEDURE — 74011000250 HC RX REV CODE- 250: Performed by: INTERNAL MEDICINE

## 2022-02-22 PROCEDURE — 80048 BASIC METABOLIC PNL TOTAL CA: CPT

## 2022-02-22 PROCEDURE — 65660000001 HC RM ICU INTERMED STEPDOWN

## 2022-02-22 PROCEDURE — 36415 COLL VENOUS BLD VENIPUNCTURE: CPT

## 2022-02-22 RX ORDER — CARVEDILOL 3.12 MG/1
3.12 TABLET ORAL 2 TIMES DAILY WITH MEALS
Status: DISCONTINUED | OUTPATIENT
Start: 2022-02-22 | End: 2022-02-23

## 2022-02-22 RX ADMIN — DOCUSATE SODIUM 50 MG AND SENNOSIDES 8.6 MG 1 TABLET: 8.6; 5 TABLET, FILM COATED ORAL at 08:29

## 2022-02-22 RX ADMIN — CARVEDILOL 3.12 MG: 3.12 TABLET, FILM COATED ORAL at 16:52

## 2022-02-22 RX ADMIN — ASPIRIN 81 MG CHEWABLE TABLET 81 MG: 81 TABLET CHEWABLE at 08:29

## 2022-02-22 RX ADMIN — HYDROCODONE BITARTRATE AND ACETAMINOPHEN 1 TABLET: 5; 325 TABLET ORAL at 22:46

## 2022-02-22 RX ADMIN — SODIUM CHLORIDE, PRESERVATIVE FREE 10 ML: 5 INJECTION INTRAVENOUS at 13:50

## 2022-02-22 RX ADMIN — Medication 1 AMPULE: at 08:29

## 2022-02-22 RX ADMIN — ACETAMINOPHEN 325MG 650 MG: 325 TABLET ORAL at 05:52

## 2022-02-22 RX ADMIN — ACETAMINOPHEN 325MG 650 MG: 325 TABLET ORAL at 14:26

## 2022-02-22 RX ADMIN — Medication 1 AMPULE: at 21:49

## 2022-02-22 RX ADMIN — SODIUM CHLORIDE, PRESERVATIVE FREE 10 ML: 5 INJECTION INTRAVENOUS at 21:50

## 2022-02-22 RX ADMIN — TICAGRELOR 90 MG: 90 TABLET ORAL at 08:32

## 2022-02-22 RX ADMIN — HYDROCODONE BITARTRATE AND ACETAMINOPHEN 1 TABLET: 5; 325 TABLET ORAL at 00:23

## 2022-02-22 RX ADMIN — NITROGLYCERIN 1 INCH: 20 OINTMENT TOPICAL at 23:20

## 2022-02-22 RX ADMIN — TICAGRELOR 90 MG: 90 TABLET ORAL at 21:49

## 2022-02-22 RX ADMIN — LOSARTAN POTASSIUM 25 MG: 25 TABLET, FILM COATED ORAL at 08:29

## 2022-02-22 RX ADMIN — ACETAMINOPHEN 325MG 650 MG: 325 TABLET ORAL at 19:36

## 2022-02-22 RX ADMIN — SODIUM CHLORIDE, PRESERVATIVE FREE 10 ML: 5 INJECTION INTRAVENOUS at 05:46

## 2022-02-22 RX ADMIN — ROSUVASTATIN 20 MG: 20 TABLET, FILM COATED ORAL at 21:49

## 2022-02-22 RX ADMIN — METOPROLOL TARTRATE 25 MG: 25 TABLET, FILM COATED ORAL at 08:31

## 2022-02-22 NOTE — PROGRESS NOTES
Ms. Collette Moose is a 65 yo  female who was admitted with a NSTEMI and 2:1 AV block. Echo revealed EF 20%, with dilated cardiomyopathy. She received cardiac cath with PCI/ANAYA to PCI to LAD. Due to her 2:1 AV block a BIV pacemaker was implanted. Bull Alvarado is recommended for 3 months.

## 2022-02-22 NOTE — PROGRESS NOTES
Cardiology Progress Note  2/22/2022     Admit Date: 2/19/2022  Admit Diagnosis: NSTEMI (non-ST elevated myocardial infarction) ([facility] [address] [I21.4]  CC: none currently  Cardiac Assessment/Plan:     1) CAD: NQWMI (peak Trop 5.1); Rx w/ LAD ANAYA   2) CM: EF 20%; Will need LifeVest on discharge. 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    7) DJD  8) IBS     Cath 2/19/21:  LM: Large caliber vessel without significant stenosis   LAD: Large caliber vessel with mild 30% disease in proximal segment and 95-99% disease in mid segment. D1: Moderate caliber vessel without significant stenosis. D2: Small caliber vessel without significant stenosis.  LCX: Large caliber vessel with mild ostial disease with up to 30-40% narrowing, mild disease in mid segment. OM1: Small caliber vessel without significant stenosis. OM2: Moderate caliber vessel with luminal irregularities.  RCA: Large caliber dominant vessel with mild disease with up to 30% narrowing. PDA: Moderate caliber vessel with luminal irregularities. PLB: Moderate caliber vessel with luminal irregularities.    LV angiography: EF 30-35% with distal 2/3 of apical hypokinesis     LAD ANAYA & temp PM (IJ). Echo 2/20/21:   Left Ventricle: Left ventricle size is normal. Normal wall thickness. Cfp-ck-vmsgrs anterior/septal, apical, & distal inferior AK. EF  is 20%. BiV PPM ([facility]) 2/21/22 2/22: No CP/dyspnea. BPs 120-160; HR 80-90s; Vpacing; 95% RA. Good UOP  Cr 1.16; K 4.4;     Cardiac meds: asa; losartan 25; metoprolol 25 bid; crestor 20; brilinta. Change metoprolol to coreg; Mobilize (d/w nursing); Needs LifeVest setup prior to discharge  Consider future aldactone (euvolemic/CKD). For other plans, see orders.   Hospital problem list     Active Hospital Problems    Diagnosis Date Noted    [facility]non[facility]ST [facility]        Subjective: Sharon Leon reports       Chest pain X none  consistent with:  Non-cardiac CP         Atypical CP     None now  On going  Anginal CP     Dyspnea X none  at rest  with exertion         improved  unchanged  worse              PND X none  overnight       Orthopnea X none  improved  unchanged  worse   Presyncope X none  improved  unchanged  worse     Ambulated in hallway without symptoms   Yes   Ambulated in room without symptoms  Yes   Objective:     Physical Exam:  Overall VSSAF;    Visit Vitals  BP (!) 152/79 (BP 1 Location: Left upper arm, BP Patient Position: At rest)   Pulse 91   Temp 98.3 °F (36.8 °C)   Resp 16   Ht 5' 5\" (1.651 m)   Wt 68.1 kg (150 lb 1.6 oz)   SpO2 95%   BMI 24.98 kg/m²     Temp (24hrs), Av.2 °F (36.8 °C), Min:97.5 °F (36.4 °C), Max:98.6 °F (37 °C)    Patient Vitals for the past 8 hrs:   Pulse   22 0728 91   22 0302 93     Patient Vitals for the past 8 hrs:   Resp   22 0728 16   22 0302 20     Patient Vitals for the past 8 hrs:   BP   22 0728 (!) 152/79   22 0302 (!) 153/80       Intake/Output Summary (Last 24 hours) at 2022 0954  Last data filed at 2022 1900  Gross per 24 hour   Intake 300 ml   Output 100 ml   Net 200 ml     General Appearance: Well developed, well nourished, no acute distress. Ears/Nose/Mouth/Throat:   Normal MM; anicteric. JVP: WNL   Resp:   Lungs clear to auscultation bilaterally. Nl resp effort. Cardiovascular:  RRR, S1, S2 normal, no new murmur. No gallop or rub. Abdomen:   Soft, non-tender, bowel sounds are present. Extremities: No edema bilaterally. Skin:  Neuro: Warm and dry.   A/O x3, grossly nonfocal       cath site intact w/o hematoma or new bruit; distal pulse unchanged x Yes   Data Review:     Telemetry independently reviewed x sinus x Vpacing  parox afib  NSVT     ECG independently reviewed  NSR  afib  no significant changes  NSST-Tw chgs     x no new ECG provided for review   Lab results reviewed as noted below. Current medications reviewed as noted below. No results for input(s): PH, PCO2, PO2 in the last 72 hours. No results for input(s): CPK, CKMB, CKNDX, TROIQ in the last 72 hours. Recent Labs     02/22/22  0030 02/21/22  0437 02/20/22  1703 02/20/22  0431 02/20/22  0431 02/19/22 1939 02/19/22 1803 02/19/22 1803    139 137   < > 136  --    < > 137   K 4.4 4.3 4.3   < > 3.5  --    < > 4.6   * 109* 111*   < > 108  --    < > 108   CO2 19* 19* 21   < > 16*  --    < > 20*   BUN 45* 45* 42*   < > 33*  --    < > 32*   CREA 1.16* 1.58* 1.52*   < > 1.08*  --    < > 1.13*   GFRAA 56* 39* 41*   < > >60  --    < > 57*   GLU 97 111* 109*   < > 143*  --    < > 123*   PHOS  --  4.2  --   --  4.1  --   --   --    CA 8.3* 8.7 8.5   < > 8.9  --    < > 9.0   ALB  --   --   --   --   --   --   --  3.5   WBC  --  12.1*  --   --  12.9* 12.5*   < > 12.2*   HGB  --  10.7*  --   --  10.8* 11.0*   < > 10.6*   HCT  --  32.1*  --   --  32.7* 34.0*   < > 33.1*   PLT  --  372  --   --  327 350   < > 331    < > = values in this interval not displayed.      No results found for: CHOL, CHOLX, CHLST, CHOLV, HDL, HDLP, LDL, LDLC, DLDLP, TGLX, Austin Borrero, CHHD, CHHDX  Recent Labs     02/19/22 1803   *   TP 7.9   ALB 3.5   GLOB 4.4*     Recent Labs     02/19/22 1939   APTT >130.0*      No components found for: [place]    Current Facility-Administered Medications   Medication Dose Route Frequency    losartan (COZAAR) tablet 25 mg  25 mg Oral DAILY    alcohol 62% (NOZIN) nasal  1 Ampule  1 Ampule Topical Q12H    sodium chloride (NS) flush 5-40 mL  5-40 mL IntraVENous Q8H    sodium chloride (NS) flush 5-40 mL  5-40 mL IntraVENous PRN    acetaminophen (TYLENOL) tablet 650 mg  650 mg Oral Q4H PRN    HYDROcodone-acetaminophen (NORCO) 5-325 mg per tablet 1 Tablet  1 Tablet Oral Q6H PRN    metoprolol tartrate (LOPRESSOR) tablet 25 mg  25 mg Oral BID    metoprolol (LOPRESSOR) injection 5 mg  5 mg IntraVENous Q6H PRN    polyethylene glycol (MIRALAX) packet 17 g  17 g Oral DAILY    senna-docusate (PERICOLACE) 8.6-50 mg per tablet 1 Tablet  1 Tablet Oral DAILY    aspirin chewable tablet 81 mg  81 mg Oral DAILY    ticagrelor (BRILINTA) tablet 90 mg  90 mg Oral Q12H    rosuvastatin (CRESTOR) tablet 20 mg  20 mg Oral QHS    nitroglycerin (NITROBID) 2 % ointment 1 Inch  1 Inch Topical BID PRN        Carolann Brown MD

## 2022-02-22 NOTE — PROGRESS NOTES
Problem: Pressure Injury - Risk of  Goal: *Prevention of pressure injury  Description: Document Juan Antonio Scale and appropriate interventions in the flowsheet. Outcome: Progressing Towards Goal  Note: Pressure Injury Interventions:       Moisture Interventions: Absorbent underpads,Limit adult briefs,Minimize layers,Moisture barrier    Activity Interventions: Increase time out of bed,Pressure redistribution bed/mattress(bed type),PT/OT evaluation    Mobility Interventions: Pressure redistribution bed/mattress (bed type),PT/OT evaluation    Nutrition Interventions: Document food/fluid/supplement intake    Friction and Shear Interventions: Apply protective barrier, creams and emollients,Foam dressings/transparent film/skin sealants,HOB 30 degrees or less,Lift sheet,Minimize layers                Problem: Patient Education: Go to Patient Education Activity  Goal: Patient/Family Education  Outcome: Progressing Towards Goal     Problem: Falls - Risk of  Goal: *Absence of Falls  Description: Document Chema Fall Risk and appropriate interventions in the flowsheet.   Outcome: Progressing Towards Goal  Note: Fall Risk Interventions:  Mobility Interventions: Patient to call before getting OOB,OT consult for ADLs,PT Consult for mobility concerns         Medication Interventions: Bed/chair exit alarm,Patient to call before getting OOB,Teach patient to arise slowly    Elimination Interventions: Bed/chair exit alarm,Call light in reach,Stay With Me (per policy),Toilet paper/wipes in reach              Problem: Patient Education: Go to Patient Education Activity  Goal: Patient/Family Education  Outcome: Progressing Towards Goal     Problem: Patient Education: Go to Patient Education Activity  Goal: Patient/Family Education  Outcome: Progressing Towards Goal     Problem: Unstable angina/NSTEMI: Day of Admission/Day 1  Goal: Off Pathway (Use only if patient is Off Pathway)  Outcome: Progressing Towards Goal  Goal: Activity/Safety  Outcome: Progressing Towards Goal  Goal: Consults, if ordered  Outcome: Progressing Towards Goal  Goal: Diagnostic Test/Procedures  Outcome: Progressing Towards Goal  Goal: Nutrition/Diet  Outcome: Progressing Towards Goal  Goal: Discharge Planning  Outcome: Progressing Towards Goal  Goal: Medications  Outcome: Progressing Towards Goal  Goal: Respiratory  Outcome: Progressing Towards Goal  Goal: Treatments/Interventions/Procedures  Outcome: Progressing Towards Goal  Goal: Psychosocial  Outcome: Progressing Towards Goal  Goal: *Hemodynamically stable  Outcome: Progressing Towards Goal  Goal: *Optimal pain control at patient's stated goal  Outcome: Progressing Towards Goal  Goal: *Lungs clear or at baseline  Outcome: Progressing Towards Goal     Problem: Unstable angina/NSTEMI: Day 2  Goal: Off Pathway (Use only if patient is Off Pathway)  Outcome: Progressing Towards Goal  Goal: Activity/Safety  Outcome: Progressing Towards Goal  Goal: Consults, if ordered  Outcome: Progressing Towards Goal  Goal: Diagnostic Test/Procedures  Outcome: Progressing Towards Goal  Goal: Nutrition/Diet  Outcome: Progressing Towards Goal  Goal: Discharge Planning  Outcome: Progressing Towards Goal  Goal: Medications  Outcome: Progressing Towards Goal  Goal: Respiratory  Outcome: Progressing Towards Goal  Goal: Treatments/Interventions/Procedures  Outcome: Progressing Towards Goal  Goal: Psychosocial  Outcome: Progressing Towards Goal  Goal: *Hemodynamically stable  Outcome: Progressing Towards Goal  Goal: *Optimal pain control at patient's stated goal  Outcome: Progressing Towards Goal  Goal: *Lungs clear or at baseline  Outcome: Progressing Towards Goal     Problem: Unstable Angina/NSTEMI: Discharge Outcomes  Goal: *Hemodynamically stable  Outcome: Progressing Towards Goal  Goal: *Stable cardiac rhythm  Outcome: Progressing Towards Goal  Goal: *Lungs clear or at baseline  Outcome: Progressing Towards Goal  Goal: *Optimal pain control at patient's stated goal  Outcome: Progressing Towards Goal  Goal: *Identifies cardiac risk factors  Outcome: Progressing Towards Goal  Goal: *Verbalizes home exercise program, activity guidelines, cardiac precautions  Outcome: Progressing Towards Goal  Goal: *Verbalizes understanding and describes prescribed diet  Outcome: Progressing Towards Goal  Goal: *Verbalizes name, dosage, time, side effects, and number of days to continue medications  Outcome: Progressing Towards Goal  Goal: *Anxiety reduced or absent  Outcome: Progressing Towards Goal  Goal: *Understands and describes signs and symptoms to report to providers(Stroke Metric)  Outcome: Progressing Towards Goal  Goal: *Describes follow-up/return visits to physicians  Outcome: Progressing Towards Goal  Goal: *Describes available resources and support systems  Outcome: Progressing Towards Goal  Goal: *Influenza immunization  Outcome: Progressing Towards Goal  Goal: *Pneumococcal immunization  Outcome: Progressing Towards Goal  Goal: *Describes smoking cessation resources  Outcome: Progressing Towards Goal     Problem: Patient Education: Go to Patient Education Activity  Goal: Patient/Family Education  Outcome: Progressing Towards Goal     Problem: Pacer/ICD: Pre-Procedure  Goal: Off Pathway (Use only if patient is Off Pathway)  Outcome: Progressing Towards Goal  Goal: Activity/Safety  Outcome: Progressing Towards Goal  Goal: Consults, if ordered  Outcome: Progressing Towards Goal  Goal: Diagnostic Test/Procedures  Outcome: Progressing Towards Goal  Goal: Nutrition/Diet  Outcome: Progressing Towards Goal  Goal: Discharge Planning  Outcome: Progressing Towards Goal  Goal: Medications  Outcome: Progressing Towards Goal  Goal: Respiratory  Outcome: Progressing Towards Goal  Goal: Treatments/Interventions/Procedures  Outcome: Progressing Towards Goal  Goal: Psychosocial  Outcome: Progressing Towards Goal  Goal: *Verbalize description of procedure  Outcome: Progressing Towards Goal  Goal: *Consent signed  Outcome: Progressing Towards Goal     Problem: Pacer/ICD: Post-Procedure  Goal: Off Pathway (Use only if patient is Off Pathway)  Outcome: Progressing Towards Goal  Goal: Activity/Safety  Outcome: Progressing Towards Goal  Goal: Consults, if ordered  Outcome: Progressing Towards Goal  Goal: Diagnostic Test/Procedures  Outcome: Progressing Towards Goal  Goal: Nutrition/Diet  Outcome: Progressing Towards Goal  Goal: Discharge Planning  Outcome: Progressing Towards Goal  Goal: Medications  Outcome: Progressing Towards Goal  Goal: Respiratory  Outcome: Progressing Towards Goal  Goal: Treatments/Interventions/Procedures  Outcome: Progressing Towards Goal  Goal: Psychosocial  Outcome: Progressing Towards Goal  Goal: *Hemodynamically stable  Outcome: Progressing Towards Goal  Goal: *Optimal pain control at patient's stated goal  Outcome: Progressing Towards Goal  Goal: *Absence of signs and symptoms of infection or wound complication  Outcome: Progressing Towards Goal     Problem: Pacer/ICD: Day 1  Goal: Off Pathway (Use only if patient is Off Pathway)  Outcome: Progressing Towards Goal  Goal: Activity/Safety  Outcome: Progressing Towards Goal  Goal: Diagnostic Test/Procedures  Outcome: Progressing Towards Goal  Goal: Nutrition/Diet  Outcome: Progressing Towards Goal  Goal: Discharge Planning  Outcome: Progressing Towards Goal  Goal: Medications  Outcome: Progressing Towards Goal  Goal: Respiratory  Outcome: Progressing Towards Goal  Goal: Treatments/Interventions/Procedures  Outcome: Progressing Towards Goal  Goal: Psychosocial  Outcome: Progressing Towards Goal     Problem: Pacer/ICD: Discharge Outcomes  Goal: *Hemodynamically stable  Outcome: Progressing Towards Goal  Goal: *Stable cardiac rhythm  Outcome: Progressing Towards Goal  Goal: *Lungs clear or at baseline  Outcome: Progressing Towards Goal  Goal: *Demonstrates ability to perform prescribed activity without shortness of breath or discomfort  Outcome: Progressing Towards Goal  Goal: *Verbalizes home exercise program, activity guidelines, cardiac precautions  Outcome: Progressing Towards Goal  Goal: *Verbalizes understanding and describes prescribed diet  Outcome: Progressing Towards Goal  Goal: *Verbalizes understanding and describes medication purposes and frequencies  Outcome: Progressing Towards Goal  Goal: *Identifies cardiac risk factors  Outcome: Progressing Towards Goal  Goal: *No signs and symptoms of infection or wound complications  Outcome: Progressing Towards Goal  Goal: *Anxiety reduced or absent  Outcome: Progressing Towards Goal  Goal: *Understands and describes signs and symptoms to report to providers(Stroke Metric)  Outcome: Progressing Towards Goal  Goal: *Describes follow-up/return visits to physicians  Outcome: Progressing Towards Goal  Goal: *Describes available resources and support systems  Outcome: Progressing Towards Goal  Goal: *Influenza immunization  Outcome: Progressing Towards Goal  Goal: *Pneumococcal immunization  Outcome: Progressing Towards Goal  Goal: *Optimal pain control at patient's stated goal  Outcome: Progressing Towards Goal

## 2022-02-22 NOTE — PROGRESS NOTES
Problem: Pressure Injury - Risk of  Goal: *Prevention of pressure injury  Description: Document Juan Antonio Scale and appropriate interventions in the flowsheet. Outcome: Progressing Towards Goal  Note: Pressure Injury Interventions:       Moisture Interventions: Absorbent underpads,Apply protective barrier, creams and emollients,Assess need for specialty bed,Internal/External urinary devices,Limit adult briefs,Maintain skin hydration (lotion/cream),Minimize layers,Moisture barrier,Offer toileting Q_hr    Activity Interventions: Assess need for specialty bed,Increase time out of bed,Pressure redistribution bed/mattress(bed type)    Mobility Interventions: Assess need for specialty bed,Float heels,HOB 30 degrees or less    Nutrition Interventions: Document food/fluid/supplement intake,Discuss nutritional consult with provider    Friction and Shear Interventions: Apply protective barrier, creams and emollients,Feet elevated on foot rest,HOB 30 degrees or less,Lift sheet,Minimize layers                Problem: Patient Education: Go to Patient Education Activity  Goal: Patient/Family Education  Outcome: Progressing Towards Goal     Problem: Falls - Risk of  Goal: *Absence of Falls  Description: Document Chema Fall Risk and appropriate interventions in the flowsheet.   Outcome: Progressing Towards Goal  Note: Fall Risk Interventions:            Medication Interventions: Bed/chair exit alarm,Patient to call before getting OOB,Teach patient to arise slowly    Elimination Interventions: Bed/chair exit alarm,Call light in reach,Patient to call for help with toileting needs,Stay With Me (per policy),Toilet paper/wipes in reach,Toileting schedule/hourly rounds              Problem: Patient Education: Go to Patient Education Activity  Goal: Patient/Family Education  Outcome: Progressing Towards Goal

## 2022-02-22 NOTE — PROGRESS NOTES
0700: Bedside and Verbal shift change report given to Joseluis Tony RN (oncoming nurse) by Desiree Morales (offgoing nurse). Report included the following information SBAR, Kardex, Intake/Output, MAR, Recent Results and Cardiac Rhythm Biventricular paced. 0830: Cardiology at bedside. Per cardiology pt no longer needs to wear the arm sling. 1100: Pt ambulated in the hallway. Tolerated ambulation well. 1230: Life Vest representative at bedside. 1500: Pt ambulating in the hallway. Tolerated ambulation well. 1655: Pt ambulating in the hallway. Tolerated ambulation well.     1900:   End of Shift Note    Bedside shift change report given to LINAD Bass (oncoming nurse) by Joseluis Tony RN (offgoing nurse). Report included the following information SBAR, Kardex, Intake/Output, MAR, Recent Results and Cardiac Rhythm Biventricular PAced    Shift worked:  [id number]     Shift summary and any significant changes:     See above     Concerns for physician to address:  none     Zone phone for oncoming shift:          Activity:  Activity Level: Up with Assistance  Number times ambulated in hallways past shift: 3  Number of times OOB to chair past shift: 3    Cardiac:   Cardiac Monitoring: Yes      Cardiac Rhythm: Biventricular Paced    Access:   Current line(s): PIV     Genitourinary:   Urinary status: voiding    Respiratory:   O2 Device: None (Room air)  Chronic home O2 use?: NO  Incentive spirometer at bedside: YES       GI:  Last Bowel Movement Date: 02/18/22 (at home)  Current diet:  ADULT DIET Regular; Low Sodium (2 gm); 1800 ml  Passing flatus: YES  Tolerating current diet: YES       Pain Management:   Patient states pain is manageable on current regimen: YES    Skin:  Juan Antonio Score: 19  Interventions: increase time out of bed    Patient Safety:  Fall Score:  Total Score: 3  Interventions: gripper socks  High Fall Risk: Yes    Length of Stay:  Expected LOS: 5d 4h  Actual LOS: Jose Li, RN

## 2022-02-22 NOTE — PROGRESS NOTES
End of Shift Note    Bedside shift change report given to Anna Martines RN (oncoming nurse) by Marielos Ghosh RN (offgoing nurse). Report included the following information SBAR, Kardex, ED Summary, Procedure Summary, Intake/Output, MAR, Recent Results, Med Rec Status and Cardiac Rhythm Biventicular paced    Shift worked:  7p-7a     Shift summary and any significant changes:     transfered from CCU, L arm stayed in sling with ice majority of the night. Norco and tylenol given X1. Surgical site clean and dry with surgical glue. lifevest fitting today     Concerns for physician to address:       Zone phone for oncoming shift:          Activity:  Activity Level: Bed Rest  Number times ambulated in hallways past shift: 0  Number of times OOB to chair past shift: 0    Cardiac:   Cardiac Monitoring: Yes      Cardiac Rhythm: Biventricular Paced    Access:   Current line(s): PIV     Genitourinary:   Urinary status: voiding and external catheter    Respiratory:   O2 Device: None (Room air)  Chronic home O2 use?: NO  Incentive spirometer at bedside: NO     GI:  Last Bowel Movement Date: 02/18/22 (at home)  Current diet:  ADULT DIET Regular; Low Sodium (2 gm); 1800 ml  Passing flatus: YES  Tolerating current diet: YES       Pain Management:   Patient states pain is manageable on current regimen: YES    Skin:  Juan Antonio Score: 15  Interventions: float heels, increase time out of bed and limit briefs    Patient Safety:  Fall Score:  Total Score: 2  Interventions: bed/chair alarm, gripper socks, pt to call before getting OOB and stay with me (per policy)       Length of Stay:  Expected LOS: 3d 21h  Actual LOS: Via Lilly Miranda RN

## 2022-02-22 NOTE — PROGRESS NOTES
Hospitalist Progress Note    NAME: Negrita Chandler   :  1950   MRN:  [id number]       Assessment / Plan:  Second degree heart block Mobitz type ll  -s/p pacemaker insertion   -cardiology consult     NSTEMI   -s/p cardiac cath   -s/p ANAYA   -continue dual antiplatelet therapy       CHF systolic chronic   -continue Losartan and lopressor         25.0 - 29.9 Overweight / Body mass index is 25.39 kg/m². Estimated discharge date:   Barriers:    Code status: Full  Prophylaxis: SCD's  Recommended Disposition: Home w/Family     Subjective:     Chief Complaint / Reason for Physician Visit  \"\". Discussed with RN events overnight. Patient seen and examined ,denies any chest pain any SOB     Review of Systems:  Symptom Y/N Comments  Symptom Y/N Comments   Fever/Chills N   Chest Pain N    Poor Appetite    Edema     Cough    Abdominal Pain     Sputum    Joint Pain     SOB/FRANCO N   Pruritis/Rash     Nausea/vomit    Tolerating PT/OT     Diarrhea    Tolerating Diet Y    Constipation    Other       Could NOT obtain due to:      Objective:     VITALS:   Last 24hrs VS reviewed since prior progress note.  Most recent are:  Patient Vitals for the past 24 hrs:   Temp Pulse Resp BP SpO2   22 2222 98.4 °F (36.9 °C) 87 20 134/87 96 %   22 2032 98.2 °F (36.8 °C) 82 20 136/82 96 %   22 1900 -- 80 24 (!) 125/51 93 %   22 1800 -- 86 22 137/69 97 %   22 1700 -- 100 24 (!) 168/72 98 %   22 1645 -- 97 23 -- 91 %   22 1630 -- (!) 104 22 (!) 188/74 (!) 89 %   22 1300 -- (!) 51 24 (!) 157/46 95 %   22 1200 97.5 °F (36.4 °C) (!) 52 25 (!) 141/45 95 %   22 1100 -- (!) 51 26 (!) 140/60 95 %   22 1000 -- (!) 52 23 (!) 133/51 97 %   22 0900 -- (!) 53 22 (!) 139/53 96 %   22 0800 97.9 °F (36.6 °C) (!) 51 21 (!) 116/51 95 %   22 0700 -- (!) 49 23 (!) 128/51 94 %   22 0600 -- (!) 58 16 (!) 123/49 92 %   22 0500 -- 60 21 (!) 133/50 91 % 02/21/22 0400 97.7 °F (36.5 °C) (!) 57 24 (!) 124/42 93 %   02/21/22 0300 -- 63 20 129/64 95 %   02/21/22 0200 -- (!) 57 22 (!) 120/38 93 %   02/21/22 0100 -- (!) 47 23 122/63 94 %   02/21/22 0000 98.2 °F (36.8 °C) (!) 54 23 (!) 112/45 94 %   02/20/22 2300 -- (!) 51 23 (!) 125/51 94 %       Intake/Output Summary (Last 24 hours) at 2/21/2022 2246  Last data filed at 2/21/2022 1900  Gross per 24 hour   Intake 300 ml   Output 400 ml   Net -100 ml        I had a face to face encounter and independently examined this patient on 2/21/2022, as outlined below:  PHYSICAL EXAM:  General: WD, WN. Alert, cooperative, no acute distress    EENT:  EOMI. Anicteric sclerae. MMM  Resp:  CTA bilaterally, no wheezing or rales. No accessory muscle use  CV:  Regular  rhythm,  No edema  GI:  Soft, Non distended, Non tender. +Bowel sounds  Neurologic:  Alert and oriented X 3, normal speech,   Psych:   Good insight. Not anxious nor agitated  Skin:  No rashes. No jaundice    Reviewed most current lab test results and cultures  YES  Reviewed most current radiology test results   YES  Review and summation of old records today    NO  Reviewed patient's current orders and MAR    YES  PMH/ reviewed - no change compared to H&P  ________________________________________________________________________  Care Plan discussed with:    Comments   Patient Y    Family      RN Y    Care Manager     Consultant                        Multidiciplinary team rounds were held today with , nursing, pharmacist and clinical coordinator. Patient's plan of care was discussed; medications were reviewed and discharge planning was addressed.      ________________________________________________________________________  Total NON critical care TIME:  35   Minutes    Total CRITICAL CARE TIME Spent:   Minutes non procedure based      Comments   >50% of visit spent in counseling and coordination of care ________________________________________________________________________  Nery Man MD     Procedures: see electronic medical records for all procedures/Xrays and details which were not copied into this note but were reviewed prior to creation of Plan. LABS:  I reviewed today's most current labs and imaging studies. Pertinent labs include:  Recent Labs     02/21/22 0437 02/20/22  0431 02/19/22  1939   WBC 12.1* 12.9* 12.5*   HGB 10.7* 10.8* 11.0*   HCT 32.1* 32.7* 34.0*    327 350     Recent Labs     02/21/22  0437 02/20/22  1703 02/20/22  0431 02/19/22  1803 02/19/22  1803    137 136   < > 137   K 4.3 4.3 3.5   < > 4.6   * 111* 108   < > 108   CO2 19* 21 16*   < > 20*   * 109* 143*   < > 123*   BUN 45* 42* 33*   < > 32*   CREA 1.58* 1.52* 1.08*   < > 1.13*   CA 8.7 8.5 8.9   < > 9.0   MG 2.4  --  2.2  --   --    PHOS 4.2  --  4.1  --   --    ALB  --   --   --   --  3.5   TBILI  --   --   --   --  0.5   ALT  --   --   --   --  46    < > = values in this interval not displayed. Signed:  Nery Man MD

## 2022-02-23 VITALS
TEMPERATURE: 98 F | WEIGHT: 149.91 LBS | SYSTOLIC BLOOD PRESSURE: 133 MMHG | RESPIRATION RATE: 18 BRPM | DIASTOLIC BLOOD PRESSURE: 71 MMHG | BODY MASS INDEX: 24.98 KG/M2 | HEART RATE: 89 BPM | HEIGHT: 65 IN | OXYGEN SATURATION: 92 %

## 2022-02-23 LAB
ANION GAP SERPL CALC-SCNC: 7 MMOL/L (ref 5–15)
BUN SERPL-MCNC: 35 MG/DL (ref 6–20)
BUN/CREAT SERPL: 36 (ref 12–20)
CALCIUM SERPL-MCNC: 8.5 MG/DL (ref 8.5–10.1)
CHLORIDE SERPL-SCNC: 110 MMOL/L (ref 97–108)
CHOLEST SERPL-MCNC: 76 MG/DL
CO2 SERPL-SCNC: 19 MMOL/L (ref 21–32)
CREAT SERPL-MCNC: 0.96 MG/DL (ref 0.55–1.02)
GLUCOSE SERPL-MCNC: 96 MG/DL (ref 65–100)
HDLC SERPL-MCNC: 27 MG/DL
HDLC SERPL: 2.8 {RATIO} (ref 0–5)
LDLC SERPL CALC-MCNC: 16.4 MG/DL (ref 0–100)
POTASSIUM SERPL-SCNC: 4.2 MMOL/L (ref 3.5–5.1)
SODIUM SERPL-SCNC: 136 MMOL/L (ref 136–145)
TRIGL SERPL-MCNC: 163 MG/DL (ref ?–150)
TSH SERPL DL<=0.05 MIU/L-ACNC: 2.27 UIU/ML (ref 0.36–3.74)
VLDLC SERPL CALC-MCNC: 32.6 MG/DL

## 2022-02-23 PROCEDURE — 36415 COLL VENOUS BLD VENIPUNCTURE: CPT

## 2022-02-23 PROCEDURE — 74011250637 HC RX REV CODE- 250/637: Performed by: INTERNAL MEDICINE

## 2022-02-23 PROCEDURE — 97161 PT EVAL LOW COMPLEX 20 MIN: CPT

## 2022-02-23 PROCEDURE — 74011000250 HC RX REV CODE- 250: Performed by: INTERNAL MEDICINE

## 2022-02-23 PROCEDURE — 97116 GAIT TRAINING THERAPY: CPT

## 2022-02-23 PROCEDURE — 74011250637 HC RX REV CODE- 250/637: Performed by: STUDENT IN AN ORGANIZED HEALTH CARE EDUCATION/TRAINING PROGRAM

## 2022-02-23 PROCEDURE — 80061 LIPID PANEL: CPT

## 2022-02-23 PROCEDURE — 80048 BASIC METABOLIC PNL TOTAL CA: CPT

## 2022-02-23 PROCEDURE — 84443 ASSAY THYROID STIM HORMONE: CPT

## 2022-02-23 RX ORDER — SPIRONOLACTONE 25 MG/1
12.5 TABLET ORAL EVERY MORNING
Qty: 30 TABLET | Refills: 12 | Status: SHIPPED | OUTPATIENT
Start: 2022-02-23

## 2022-02-23 RX ORDER — GUAIFENESIN 100 MG/5ML
81 LIQUID (ML) ORAL DAILY
Qty: 30 TABLET | Refills: 12 | Status: SHIPPED
Start: 2022-02-23

## 2022-02-23 RX ORDER — CARVEDILOL 6.25 MG/1
6.25 TABLET ORAL 2 TIMES DAILY WITH MEALS
Status: DISCONTINUED | OUTPATIENT
Start: 2022-02-23 | End: 2022-02-23 | Stop reason: HOSPADM

## 2022-02-23 RX ORDER — ROSUVASTATIN CALCIUM 20 MG/1
20 TABLET, COATED ORAL
Qty: 30 TABLET | Refills: 12 | Status: SHIPPED | OUTPATIENT
Start: 2022-02-23

## 2022-02-23 RX ORDER — CARVEDILOL 6.25 MG/1
6.25 TABLET ORAL 2 TIMES DAILY WITH MEALS
Qty: 60 TABLET | Refills: 12 | Status: SHIPPED | OUTPATIENT
Start: 2022-02-23

## 2022-02-23 RX ORDER — HYDROCODONE BITARTRATE AND ACETAMINOPHEN 5; 325 MG/1; MG/1
1 TABLET ORAL
Qty: 10 TABLET | Refills: 0 | Status: SHIPPED | OUTPATIENT
Start: 2022-02-23 | End: 2022-02-26

## 2022-02-23 RX ORDER — SPIRONOLACTONE 25 MG/1
12.5 TABLET ORAL DAILY
Status: DISCONTINUED | OUTPATIENT
Start: 2022-02-23 | End: 2022-02-23 | Stop reason: HOSPADM

## 2022-02-23 RX ORDER — LOSARTAN POTASSIUM 25 MG/1
25 TABLET ORAL DAILY
Qty: 30 TABLET | Refills: 12 | Status: SHIPPED | OUTPATIENT
Start: 2022-02-23

## 2022-02-23 RX ADMIN — LOSARTAN POTASSIUM 25 MG: 25 TABLET, FILM COATED ORAL at 10:10

## 2022-02-23 RX ADMIN — TICAGRELOR 90 MG: 90 TABLET ORAL at 10:14

## 2022-02-23 RX ADMIN — ACETAMINOPHEN 325MG 650 MG: 325 TABLET ORAL at 10:15

## 2022-02-23 RX ADMIN — ASPIRIN 81 MG CHEWABLE TABLET 81 MG: 81 TABLET CHEWABLE at 10:09

## 2022-02-23 RX ADMIN — CARVEDILOL 6.25 MG: 6.25 TABLET, FILM COATED ORAL at 10:09

## 2022-02-23 RX ADMIN — SPIRONOLACTONE 12.5 MG: 25 TABLET ORAL at 10:12

## 2022-02-23 RX ADMIN — SODIUM CHLORIDE, PRESERVATIVE FREE 10 ML: 5 INJECTION INTRAVENOUS at 06:19

## 2022-02-23 RX ADMIN — ACETAMINOPHEN 325MG 650 MG: 325 TABLET ORAL at 06:15

## 2022-02-23 NOTE — ROUTINE PROCESS
DISCHARGE SUMMARY FROM [facility] NURSE    The patient is stable for discharge. I have reviewed the discharge instructions with the patient and spouse. The patient and spouse verbalized understanding. All questions were fully answered. The patient and spouse verbalized no complaints. Hard scripts and medication handouts were given and reviewed with the patient and spouse. Appropriate educational materials and medication side effects teaching were also provided. Cardiac monitor and IV line(s) were removed. The following personal items collected during admission were returned to the patient/family  Home medications: none  Dental Appliance: Dental Appliances: None  Vision: Visual Aid: Glasses,With patient  Hearing Aid:    Jewelry: Jewelry: None  Clothing: Clothing: At bedside,Shirt,Undergarments  Other Valuables: Other Valuables: None  Valuables sent to safe: There were no personal belongings, valuables or home medications left at patient's bedside,  or safe. Patient has her LIFE VEST supplies and has her life vest on.

## 2022-02-23 NOTE — PROGRESS NOTES
PHYSICAL THERAPY EVALUATION/DISCHARGE  Patient: Senthil Kelly (23 y.o. female)  Date: 2/23/2022  Primary Diagnosis: NSTEMI (non-ST elevated myocardial infarction) ([facility] [I21.4]  Procedure(s) (LRB):  INSERT PPM BIV MULTI (N/A) 2 Days Post-Op   Precautions: PPM          ASSESSMENT  Based on the objective data described below, the patient presents with I and safe mobility, which is her baseline. Pt received in room, with  present. Education provided regarding PPM precautions. Pt without any deficits that warrant further PT intervention in the acute setting or at discharge. Pt safe to discharge home with assistance from her . Will sign off. Functional Outcome Measure: The patient scored 28/28 on the Tinetti test outcome measure which is indicative of a low fall risk. Other factors to consider for discharge: support      Further skilled acute physical therapy is not indicated at this time. PLAN :  Recommendation for discharge: (in order for the patient to meet his/her long term goals)  No skilled physical therapy/ follow up rehabilitation needs identified at this time.     This discharge recommendation:  A follow-up discussion with the attending provider and/or case management is planned    IF patient discharges home will need the following DME: none       SUBJECTIVE:   Patient stated I could've gone home yesterday, but I didn't have the life vest.    OBJECTIVE DATA SUMMARY:   HISTORY:    Past Medical History:   Diagnosis Date    Arthritis     hands,  left knee    Colon polyps     Hypertension     IBS (irritable bowel syndrome)     colostomy  revresed    Nausea & vomiting     anesthesia related    Other ill-defined conditions(799.89)     seasonal allergies     Past Surgical History:   Procedure Laterality Date    ABDOMEN SURGERY PROC UNLISTED  3/10    sigmoid colectomy (colostomy)-reversed    COLONOSCOPY N/A 9/8/2020    COLONOSCOPY performed by Nicolle Grossman MD at [facility]    HX APPENDECTOMY      HX CHOLECYSTECTOMY  09/06/13    Laparoscopic Cholecystectomty @ [address]    HX DILATION AND CURETTAGE      HX HYSTERECTOMY      RSO    HX MOHS PROCEDURES      right    HX ORTHOPAEDIC  [id number]    \"removal right kneecap\"--\"infection\"    HX ORTHOPAEDIC      right knee bursa    HX OTHER SURGICAL  2010    colonoscopy-polyps --perforated sigmoid     HX PELVIC LAPAROSCOPY      HX TONSILLECTOMY         Prior level of function: I, retired, drving, lives with . Personal factors and/or comorbidities impacting plan of care: none    Home Situation  Home Environment: Private residence  # Steps to Enter: 2  One/Two Story Residence: One story  Living Alone: No  Support Systems: Spouse/Significant Other  Patient Expects to be Discharged to[de-identified] Home with family assistance  Current DME Used/Available at Home: Grab bars (at toilet)  Tub or Shower Type: Shower    EXAMINATION/PRESENTATION/DECISION MAKING:   Critical Behavior:              Hearing: Auditory  Auditory Impairment: None  Range Of Motion:  AROM: Within functional limits                       Strength:    Strength:  Within functional limits                    Tone & Sensation:   Tone: Normal              Sensation: Intact               Coordination:  Coordination: Within functional limits  Functional Mobility:  Bed Mobility:  Rolling: Independent  Supine to Sit: Independent  Sit to Supine: Independent  Scooting: Independent  Transfers:  Sit to Stand: Independent  Stand to Sit: Independent                       Balance:   Sitting: Intact  Standing: Intact  Ambulation/Gait Training:              Gait Description (WDL): Within defined limits              Stairs:    not assessed, pt with adequate BLE ROM/Strength to safely navigate her 2 steps at home           Functional Measure:  Tinetti test:    Sitting Balance: 1  Arises: 2  Attempts to Rise: 2  Immediate Standing Balance: 2  Standing Balance: 2  Nudged: 2  Eyes Closed: 1  Turn 360 Degrees - Continuous/Discontinuous: 1  Turn 360 Degrees - Steady/Unsteady: 1  Sitting Down: 2  Balance Score: 16 Balance total score  Indication of Gait: 1  R Step Length/Height: 1  L Step Length/Height: 1  R Foot Clearance: 1  L Foot Clearance: 1  Step Symmetry: 1  Step Continuity: 1  Path: 2  Trunk: 2  Walking Time: 1  Gait Score: 12 Gait total score  Total Score: 28/28 Overall total score         Tinetti Tool Score Risk of Falls  <19 = High Fall Risk  19-24 = Moderate Fall Risk  25-28 = Low Fall Risk  Tinetti ME. Performance-Oriented Assessment of Mobility Problems in Elderly Patients. [facility] 66; [id number]. (Scoring Description: PT Bulletin Feb. 10, 1993)    Older adults: Sherline Osgood et al, 2009; n = 1000 [facility] elderly evaluated with ABC, SANGITA, ADL, and IADL)  · Mean SANGITA score for males aged 69-68 years = 26.21(3.40)  · Mean SANGITA score for females age 69-68 years = 25.16(4.30)  · Mean SANGITA score for males over 80 years = 23.29(6.02)  · Mean SANGITA score for females over 80 years = 17.20(8.32)       Activity Tolerance:   Good      After treatment patient left in no apparent distress:   Supine in bed, Call bell within reach and Caregiver / family present    COMMUNICATION/EDUCATION:   The patients plan of care was discussed with: Registered nurse. Fall prevention education was provided and the patient/caregiver indicated understanding.     Thank you for this referral.  Desiree Bill, PT   Time Calculation: 15 mins

## 2022-02-23 NOTE — DISCHARGE INSTRUCTIONS
[address], [address]   [phone]  [place], [address] [facility]    [email or web address]    Patient Discharge Instructions    Merline Sanders / [id number] : 1950    Admitted 2022 Discharged 2022       · It is important that you take the medication exactly as they are prescribed. · Keep your medication in the bottles provided by the pharmacist and keep a list of the medication names, dosages, and times to be taken in your wallet. · Do not take other medications without consulting your doctor. BRING ALL OF YOUR MEDICINES  or a list of medicines with dosages TO YOUR OFFICE VISIT. Follow-up:   Follow-up Information     Follow up With Specialties Details Why Contact Info    Jerrod Leung MD Cardio Vascular Surgery, Cardiology In 2 weeks  [address]  [id number]  [facility] [address] [phone]              DISCHARGE INSTRUCTIONS FOR PATIENTS WITH PACEMAKERS    You had a [employer] biventricular pacemaker implanted due to a progressive and dangerous slow heart rate problem which now is treated by the pacemaker. 1. Remember to call for an appointment in 2-4 weeks [phone] to check healing and implant programming with Dr. Christopher Funk nurse, [facility]. 2. Medic Alert Bracelets are available from your pharmacist to wear at all times if you choose to wear one. 3. Carry your ID card for pacemaker with you at all times. This card will be given to you in the hospital or mailed to you. 4. The pacemaker will bulge slightly under your skin. The bulge will decrease in size over the next few weeks. Please notify the doctor's office if you notice any of the following around your site:   A.  A bruise that does not go away. B.  Soreness or yellow, green, or brown drainage from the site. C. Any swelling from the site. D.   If you have a fever of 100 degrees or higher that lasts for a few days. INCISION CARE     1.  Leave skin glue over your site until it starts to fall off, usually in a few weeks. 2.  You may shower after 3 days as long as your incision isnt submerged or directly sprayed upon until well healed. 3.  For comfort, wear loose fitting clothing. 4.  Report any signs of infection, fever, pain, swelling, redness, oozing, or heat at site especially if these symptoms increase after the first 3 to 4 days. ACTIVITY PRECAUTIONS   1. Avoid rough contact with the implant site. 2. No driving for 14 days. 3. Avoid lifting your arm over your head, carrying anything on the affected side, or lifting over 10 pounds for 30 days. For the first 2 days only bend your arm at the elbow. 4. Any extreme activity such as golf, weight lifting or exercise biking should be restricted for 60 days. 5. Do not carry objects by holding them against your implant site. 6.  No shooting rifles or any type of gun with the affected shoulder permanently. SPECIAL PRECAUTIONS  1. You should avoid all strong magnetic fields, such as arc welding, large transformers, large motors. 2.  You MAY have an MRI which uses a strong magnet to take pictures if given the OK by a cardiologist.  3.  Treatments or surgery that requires diathermy or electrocautery should be discussed with your doctor before scheduled. 4. Avoid radio frequency transmitters, including radar. 5. Advise dentist or other medical personnel you see that you have a pacemaker. 6.  Cell phones and microwave oven use is okay. 7.  If you plan to move or take a trip to a new area, the doctor's office will give you a name of a doctor to contact for any problems. ANTIBIOTIC THERAPY  During the first 8 weeks after your pacemaker insertion, you may need antibiotics before any dental work or certain tests or operations. Let the dentist or doctor who is caring for you know that you have had an implanted device.     Signed By: Rickie Beltrán MD     February 21, 2022            Heart Attack: After Your Hospitalization     Your Care Instructions     A heart attack (myocardial infarction, or MI) occurs when one or more of the coronary arteries, which supply the heart with oxygen-rich blood, is blocked. A blockage usually occurs when plaque inside the artery breaks open and a blood clot forms in the artery. After a heart attack, you may be worried about your future. Over the next several weeks, your heart will start to heal. Although it is sometimes hard to break old habits, you can prevent another heart attack by making some lifestyle changes and by taking medicines. You may use the following information for ideas about what to do at home to speed your recovery. Follow-up care is a key part of your treatment and safety. Be sure to make and go to all appointments, and call your doctor if you are having problems. It is also a good idea to keep a list of the medicines you take, including dose. How can you care for yourself at home? Activity  Increase your activities slowly. Take short rest breaks when you get tired. As you are able, get more exercise. Walking is a good choice. Bit by bit, increase the amount you walk every day. Try for at least 30 minutes on most days of the week. You also may want to swim, bike, or do other activities. Cardiac rehabilitation (rehab) program is strongly recommended. Cardiac rehab includes supervised exercise, help with diet and lifestyle changes, and emotional support. It may reduce your risk of future heart problems. Do not drive until your doctor says you can. You can have sex when you are strong enough. This usually means when you can easily walk around or climb stairs. Talk with your doctor if you have any concerns. Do not take sildenafil citrate (Viagra), tadalafil (Cialis), or vardenafil (Levitra) if you are taking nitroglycerin. Lifestyle changes  Do not smoke.  Smoking increases your risk of a heart attack. If you need help quitting, talk to your doctor about stop-smoking programs and medicines. These can increase your chances of quitting for good. Eat a heart-healthy diet that is low in cholesterol, saturated fat, and salt, and is full of fruits, vegetables and whole-grains. Eat at least two servings of fish each week. You may get more details about how to eat healthy, but these tips can help you get started. Our website has more information:  www.[employer]  Avoid colds and flu. Get a pneumococcal vaccine shot. If you have had one before, ask your primary care doctor whether you need a second dose. Get a flu shot every fall. If you must be around people with colds or flu, wash your hands often. Medicines  Take your medicines exactly as prescribed. Call your doctor if you think you are having a problem with your medicine. You may need several medicines. All medicines can cause side effects. So it is important to understand the pros and cons of any medicine you take. It is also important to take your medicines exactly as your doctor tells you to. · Aspirin. It helps prevent heart attack and stroke. · ACE inhibitors. These are a type of blood pressure medicine. They can reduce the risk of heart attacks and strokes. · Statin medicines. These lower cholesterol. They can also reduce the risk of heart disease and strokes. · Beta-blocker medicines. These are a type of blood pressure and heart medicine. They can reduce the chance of early death if you have had a heart attack. If Plavix, or Brilinta, or Effient is prescribed with your aspirin, you must take them to prevent your stent from clotting. You will likely need them for at least 1 to 2 years. If your doctor has given you nitroglycerin, keep it with you at all times. If you have chest pain, sit down and rest, and take the first dose of nitroglycerin if the discomfort does not resolve.  If chest pain gets worse or is not getting better within 5 minutes, call 911 immediately. Stay on the phone with the emergency ; he or she will give you further instructions. Be sure to tell your doctor about any chest pain you have had, even if it went away. Do not take any over-the-counter medicines, vitamins, or herbal products without talking to your doctor first.    Aspirin  Taking an aspirin every day can lower your risk for a heart attack. A heart attack occurs when a blood vessel in the heart gets blocked. When this happens, oxygen can't get to the heart muscle, and part of the heart dies. Aspirin can help prevent blood clots that can block the blood vessels. Check with your doctor before you start to take aspirin every day. He or she may recommend that you take one low-dose aspirin (81 mg) tablet each day, with a meal and a full glass of water. You may not be able to use aspirin if you:  · Have asthma. · Have an ulcer or other stomach problem. · Take blood thinners such as warfarin (Coumadin). · Are allergic to aspirin. Before having a surgery or procedure, tell your doctor or dentist that you take aspirin. He or she will tell you if you should stop taking aspirin beforehand. Make sure that you understand exactly what your doctor wants you to do. Aspirin can cause side effects. Call your doctor right away if you have:  · Unusual bleeding or bruising. · Nausea, vomiting, or heartburn. · Black or bloody stools. ACE inhibitors  ACE (angiotensin-converting enzyme) inhibitors are used to lower blood pressure, protect the kidneys, and prevent heart attacks and strokes. Examples include benazepril (Lotensin), lisinopril (Prinivil, Zestril), and ramipril (Altace). Before you start taking an ACE inhibitor, make sure your doctor knows if:  · You are taking a water pill (diuretic). · You are taking potassium pills or using salt substitutes. · You are pregnant or breast-feeding.   · You have had a kidney transplant or other kidney problems. ACE inhibitors can cause side effects. Call your doctor right away if you have:  · Trouble breathing. · Swelling in your face, head, neck, or tongue. · Dizziness or lightheadedness. · A dry cough. Statins  Statins lower cholesterol. Examples include atorvastatin (Lipitor), lovastatin (Mevacor), pravastatin (Pravachol), and simvastatin (Zocor). Before you start taking a statin, make sure your doctor knows if:  · You have had a kidney transplant or other kidney problems. · You have liver disease. · You take any other prescription medicine, over-the-counter medicine, vitamins, supplements, or herbal remedies. · You are pregnant or breast-feeding. Statins can cause side effects. Call your doctor right away if you have:  · New, severe muscle aches. · Brown urine. Beta-blockers  Beta-blockers are used to lower blood pressure, reduce chest pains (angina), and reduce the chances of a second heart attack. They include metoprolol (Lopressor), atenolol (Tenormin), and labetalol (Trandate). Before you start taking a beta-blocker, make sure your doctor knows if you have:  · Severe asthma or frequent asthma attacks. · A very slow pulse (less than 55 beats a minute). Beta-blockers can cause side effects. Call your doctor right away if you have:  · Wheezing or trouble breathing. · Dizziness or lightheadedness. · Asthma that gets worse  ·   Mental health  Talk to your family, friends, or a counselor about your feelings. It is normal to feel frightened, angry, hopeless, helpless, and even guilty. Talking openly about bad feelings can help you cope. If the blues last, talk to your primary care doctor. If you are smoking, please stop. Smoking Cessation Program is a free, phone/text/email based, smoking cessation program. The program is individualized to meet each patient's needs. To enroll use this link https://[employer]/ra/survey/5010      When should you call for help?     Call 911 anytime you think you may need emergency care. For example, call if:  You have signs of a heart attack. These may include:  Chest pain or pressure. Sweating. Shortness of breath. Nausea or vomiting. Pain that spreads from the chest to the neck, jaw, or one or both shoulders or arms. Dizziness or lightheadedness. A fast or irregular pulse. After calling 911, chew 1 adult-strength aspirin. Wait for an ambulance. Do not try to drive yourself. You passed out (lost consciousness). You feel like you are having another heart attack. Call your doctor now or seek immediate medical care if:  You have had any chest pain, even if it has gone away. You have new or increased shortness of breath. You are dizzy or lightheaded, or you feel like you may faint. Watch closely for changes in your health, and be sure to contact your doctor if you have any problems, including gaining 5 pounds or more. Cardiac Catheterization  Discharge Instructions  Transradial Catheterization Discharge Instructions (WRIST)     Discharge instructions: Your radial artery in your wrist was used for your cardiac catheterization. This site may be slightly bruised and sore following your procedure. Expect mild tingling or the hand and tenderness at the puncture site for up to 3 days. Excess movement of the wrist used should be avoided for the next 24-48 hours. 1. No lifting over 2 pounds (approximately a ½ gallon of milk) with this arm for 24 hours. 2. Keep the site of the procedure covered with a bandage for 24 hours. 3. You may shower the day after your procedure. Do not take a tub bath or submerge the puncture site in water for 48 hours. 4. No heavy impact activity/lifting > 10 pounds for 1 week. If bleeding of the wrist occurs at home:   If the site on your wrist where you had the catheterization procedure begins to bleed, do not panic.    1. Place 1 or 2 fingers over the puncture site and hold pressure to stop the bleeding. You may be able to feel your pulse as you hold pressure. 2. Lift your fingers after 5 minutes to see if the bleeding has stopped. 3. Once the bleeding has stopped, gently wipe the wrist area clean and cover with a bandage. If the bleeding from your wrist does not stop after 15 minutes, or if there is a large amount of bleeding or spurting, call 911 immediately (do not drive yourself to the hospital). Other concerns: The site may be slightly bruised and sore following your procedure. Should any of the following occur, contact your physician immediately:  1. Any cool or coldness of the arm, discoloration over a large area, ongoing numbness or any abnormal sensations , moderate to severe pain or swelling in the arm. 2. Redness, soreness, swelling, chills or fever, or colored drainage at the procedure site within 3-7 days after your procedure. If Plavix, or Brilinta, or Effient is prescribed with your aspirin, you must take them to prevent your stent from clotting. You will likely need them for at least 1 to 2 years. If you are smoking, please stop. Smoking Cessation Program is a free, phone/text/email based, smoking cessation program. The program is individualized to meet each patient's needs. To enroll use this link https://[employer]/ra/survey/2860      Follow-up:   Follow-up Information     Follow up With Specialties Details Why Contact Info    Neris Hewitt MD Cardio Vascular Surgery, Cardiology In 2 weeks  [address]  [id number]  [facility] [address] [facility]      Theresa Kathleen [address] [place] [address]  [phone]            Information obtained by :  I understand that if any problems occur once I am at home I am to contact my physician. I understand and acknowledge receipt of the instructions indicated above. R.N.'s Signature                                                                  Date/Time                                                                                                                                              Patient or Representative Signature                                                          Date/Time      Donna Mitchell MD      [address] [facility], [address]    [phone]

## 2022-02-23 NOTE — PROGRESS NOTES
Cardiology Progress Note  2/23/2022     Admit Date: 2/19/2022  Admit Diagnosis: NSTEMI (non-ST elevated myocardial infarction) ([facility] [address] [I21.4]  CC: none currently  Cardiac Assessment/Plan:     1) CAD: NQWMI (peak Trop 5.1); Rx w/ LAD ANAYA   2) CM: EF 20%; Will need LifeVest on discharge. 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    7) DJD  8) IBS     Cath 2/19/21:  LM: Large caliber vessel without significant stenosis   LAD: Large caliber vessel with mild 30% disease in proximal segment and 95-99% disease in mid segment. D1: Moderate caliber vessel without significant stenosis. D2: Small caliber vessel without significant stenosis.  LCX: Large caliber vessel with mild ostial disease with up to 30-40% narrowing, mild disease in mid segment. OM1: Small caliber vessel without significant stenosis. OM2: Moderate caliber vessel with luminal irregularities.  RCA: Large caliber dominant vessel with mild disease with up to 30% narrowing. PDA: Moderate caliber vessel with luminal irregularities. PLB: Moderate caliber vessel with luminal irregularities.    LV angiography: EF 30-35% with distal 2/3 of apical hypokinesis     LAD ANAYA & temp PM (IJ). Echo 2/20/21:   Left Ventricle: Left ventricle size is normal. Normal wall thickness. Gbh-mb-ukmdrp anterior/septal, apical, & distal inferior AK. EF  is 20%. BiV PPM ([facility]) 2/21/22 2/22: No CP/dyspnea. BPs 120-160; HR 80-90s; Vpacing; 95% RA. Good UOP  Cr 1.16; K 4.4;     Cardiac meds: asa; losartan 25; metoprolol 25 bid; crestor 20; brilinta. Change metoprolol to coreg; Mobilize (d/w nursing); Needs LifeVest setup prior to discharge  Consider future aldactone (euvolemic/CKD). 2/23: No Sx; VSSAF  Nl K/Cr 0.96; LDL 16; Nl TSH. Cardiac meds: asa; losartan 25; coreg 3.125 bid; crestor 20; brilinta.     Coreg to 6.25 bid; Add aldactone 12.5; LifeVest on    For other plans, see orders. Hospital problem list     Active Hospital Problems    Diagnosis Date Noted    NSTEMI (non-ST elevated myocardial infarction) ([facility] [address] 2022        Subjective: Kimberly Colon reports       Chest pain X none  consistent with:  Non-cardiac CP         Atypical CP     None now  On going  Anginal CP     Dyspnea X none  at rest  with exertion         improved  unchanged  worse              PND X none  overnight       Orthopnea X none  improved  unchanged  worse   Presyncope X none  improved  unchanged  worse     Ambulated in hallway without symptoms  x Yes   Ambulated in room without symptoms x Yes   Objective:     Physical Exam:  Overall VSSAF;    Visit Vitals  BP (!) 147/75   Pulse 96   Temp 98.1 °F (36.7 °C)   Resp 18   Ht 5' 5\" (1.651 m)   Wt 68 kg (149 lb 14.6 oz)   SpO2 94%   BMI 24.95 kg/m²     Temp (24hrs), Av.1 °F (36.7 °C), Min:97.6 °F (36.4 °C), Max:98.4 °F (36.9 °C)    Patient Vitals for the past 8 hrs:   Pulse   22 033 96     Patient Vitals for the past 8 hrs:   Resp   22 0336 18     Patient Vitals for the past 8 hrs:   BP   22 0336 (!) 147/75       Intake/Output Summary (Last 24 hours) at 2022 0756  Last data filed at 2022 1441  Gross per 24 hour   Intake 240 ml   Output --   Net 240 ml     General Appearance: Well developed, well nourished, no acute distress. Ears/Nose/Mouth/Throat:   Normal MM; anicteric. JVP: WNL   Resp:   Lungs clear to auscultation bilaterally. Nl resp effort. Cardiovascular:  RRR, S1, S2 normal, no new murmur. No gallop or rub. Abdomen:   Soft, non-tender, bowel sounds are present. Extremities: No edema bilaterally. Skin:  Neuro: Warm and dry.   A/O x3, grossly nonfocal       cath site intact w/o hematoma or new bruit; distal pulse unchanged x Yes   Data Review:     Telemetry independently reviewed x sinus x Vpacing  parox afib  NSVT     ECG independently reviewed  NSR afib  no significant changes  NSST-Tw chgs     x no new ECG provided for review   Lab results reviewed as noted below. Current medications reviewed as noted below. No results for input(s): PH, PCO2, PO2 in the last 72 hours. No results for input(s): CPK, CKMB, CKNDX, TROIQ in the last 72 hours. Recent Labs     02/23/22  0329 02/22/22  0030 02/21/22  0437    138 139   K 4.2 4.4 4.3   * 111* 109*   CO2 19* 19* 19*   BUN 35* 45* 45*   CREA 0.96 1.16* 1.58*   GFRAA >60 56* 39*   GLU 96 97 111*   PHOS  --   --  4.2   CA 8.5 8.3* 8.7   WBC  --   --  12.1*   HGB  --   --  10.7*   HCT  --   --  32.1*   PLT  --   --  372     Lab Results   Component Value Date/Time    Cholesterol, total 76 02/23/2022 03:29 AM    HDL Cholesterol 27 02/23/2022 03:29 AM    LDL, calculated 16.4 02/23/2022 03:29 AM    Triglyceride 163 (H) 02/23/2022 03:29 AM    CHOL/HDL Ratio 2.8 02/23/2022 03:29 AM     No results for input(s): AP, TBIL, TP, ALB, GLOB, GGT, AML, LPSE in the last 72 hours. No lab exists for component: SGOT, GPT, AMYP, HLPSE  No results for input(s): INR, PTP, APTT, INREXT, INREXT in the last 72 hours.    No components found for: [place]    Current Facility-Administered Medications   Medication Dose Route Frequency    carvediloL (COREG) tablet 3.125 mg  3.125 mg Oral BID WITH MEALS    losartan (COZAAR) tablet 25 mg  25 mg Oral DAILY    alcohol 62% (NOZIN) nasal  1 Ampule  1 Ampule Topical Q12H    sodium chloride (NS) flush 5-40 mL  5-40 mL IntraVENous Q8H    sodium chloride (NS) flush 5-40 mL  5-40 mL IntraVENous PRN    acetaminophen (TYLENOL) tablet 650 mg  650 mg Oral Q4H PRN    HYDROcodone-acetaminophen (NORCO) 5-325 mg per tablet 1 Tablet  1 Tablet Oral Q6H PRN    metoprolol (LOPRESSOR) injection 5 mg  5 mg IntraVENous Q6H PRN    polyethylene glycol (MIRALAX) packet 17 g  17 g Oral DAILY    senna-docusate (PERICOLACE) 8.6-50 mg per tablet 1 Tablet  1 Tablet Oral DAILY    aspirin chewable tablet 81 mg  81 mg Oral DAILY    ticagrelor (BRILINTA) tablet 90 mg  90 mg Oral Q12H    rosuvastatin (CRESTOR) tablet 20 mg  20 mg Oral QHS    nitroglycerin (NITROBID) 2 % ointment 1 Inch  1 Inch Topical BID PRN        Donna Mitchell MD

## 2022-02-23 NOTE — PROGRESS NOTES
End of Shift Note    Bedside shift change report given to LINDA Miller (oncoming nurse) by Edgardo Nicole RN (offgoing nurse). Report included the following information SBAR, Kardex, ED Summary, Procedure Summary, Intake/Output, MAR, Recent Results, Med Rec Status and Cardiac Rhythm Biventricular paced    Shift worked:  7p-7a     Shift summary and any significant changes:    gave norco X1 and tylenol X2 for pain, nitro patch applied for elevated BP, wore lifevest all night with no issues.  Possible d/c today      Concerns for physician to address:       Zone phone for oncoming shift:            Edgardo Nicole RN

## 2022-02-23 NOTE — ROUTINE PROCESS
End of Shift Note    Bedside shift change report given to na  (oncoming nurse) by Naun Baltazar RN (offgoing nurse). Report included the following information SBAR    Shift worked:  day      Shift summary and any significant changes:     patient has her life vest on has received information regarding this. She understands her meds and will call if she has further questions     Concerns for physician to address:  none      Zone phone for oncoming shift:   na        Activity:  Activity Level: Up ad hector  Number times ambulated in hallways past shift: 0  Number of times OOB to chair past shift: 1    Cardiac:   Cardiac Monitoring: Yes      Cardiac Rhythm: Ventricular Paced    Access:   Current line(s): PIV     Genitourinary:   Urinary status: voiding    Respiratory:   O2 Device: None (Room air)  Chronic home O2 use?: NO  Incentive spirometer at bedside: NO       GI:  Last Bowel Movement Date: 02/18/22  Current diet:  ADULT DIET Regular; Low Sodium (2 gm); 1800 ml  Passing flatus: NO  Tolerating current diet: YES       Pain Management:   Patient states pain is manageable on current regimen: YES    Skin:  Juan Antonio Score: 20  Interventions: increase time out of bed    Patient Safety:  Fall Score:  Total Score: 1  Interventions: pt to call before getting OOB  High Fall Risk: Yes    Length of Stay:  Expected LOS: 5d 4h  Actual LOS: [address], RN

## 2022-02-23 NOTE — PROGRESS NOTES
Transition of Care Plan:     RUR: 10% low risk  Disposition:Home with  and follow up with PCP. Follow up appointments:PCP and cardiologist.   DME needed:She has a cane at home, new [address] at [address] [facility] at [address] [facility] or means to access home:        IM Medicare Letter: given 2/23/22  Is patient a BCPI-A Bundle:      N/A                  Is patient a  and connected with the [address]? N/a  Caregiver Contact: Mansoor [facility] Anthony  Discharge Caregiver contacted prior to discharge? Patient and her   Care Conference needed?:    n/a    Initial note:  Chart reviewed. CM met with pt to discuss d/c. She is agreeable to d/c.  2nd IM letter discussed. See form. F/U PCP appt secured. AVS updated. The pts  at bedside for d/c. The pt is ready for d/c from a CM standpoint. Care Management Interventions  PCP Verified by CM: Yes  Palliative Care Criteria Met (RRAT>21 & CHF Dx)?: No  Mode of Transport at Discharge:  Other (see comment) ( at bedside for transport)  Transition of Care Consult (CM Consult): Discharge Planning  Discharge Durable Medical Equipment: No  Physical Therapy Consult: No  Occupational Therapy Consult: No  Speech Therapy Consult: No  Support Systems: Spouse/Significant Other  Confirm Follow Up Transport: Self  Discharge Location  Patient Expects to be Discharged to[de-identified] Home     Latia eLong, ABRAN  Care Manager  [phone]

## 2022-02-23 NOTE — DISCHARGE SUMMARY
Cardiology Discharge Summary     Patient ID: Saeed Seo  [id number]  37 y.o.  1950    Admit Date: 2/19/2022  Discharge Date: 2/23/2022   Admitting Physician: Yana Espino MD   Discharge Physician: Cala Galeazzi, MD    Admission Diagnoses: NSTEMI (non-ST elevated myocardial infarction) [facility]) [I21.4]    Discharge Diagnoses: Active Problems:    NSTEMI (non-ST elevated myocardial infarction) ([address] (2/19/2022)      1) CAD: NQWMI (peak Trop 5.1); Rx w/ LAD ANAYA   2) CM: EF 20%; Will need LifeVest on discharge. 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 Then 0.96 on discharge.     7) DJD  8) IBS      Cath 2/19/21:  LM: Large caliber vessel without significant stenosis   LAD: Large caliber vessel with mild 30% disease in proximal segment and 95-99% disease in mid segment. D1: Moderate caliber vessel without significant stenosis.    D2: Small caliber vessel without significant stenosis.  LCX: Large caliber vessel with mild ostial disease with up to 30-40% narrowing, mild disease in mid segment. OM1: Small caliber vessel without significant stenosis. OM2: Moderate caliber vessel with luminal irregularities.  RCA: Large caliber dominant vessel with mild disease with up to 30% narrowing. PDA: Moderate caliber vessel with luminal irregularities. PLB: Moderate caliber vessel with luminal irregularities.    LV angiography: EF 30-35% with distal 2/3 of apical hypokinesis      LAD ANAYA & temp PM (IJ).    Echo 2/20/21:   Left Ventricle: Left ventricle size is normal. Normal wall thickness. Gkm-rh-rdjizx anterior/septal, apical, & distal inferior AK. EF  is 20%.     BiV PPM ([facility]) 2/21/22      Cardiology Procedures this Admission:  Left heart catheterization with PCI  [facility]    Hospital Course:  Pt reached maximal hospital benefit and discharged to home. Ambulating without Sx prior to discharge.   LifeVest on.    Recent Labs     02/23/22  0329 02/22/22  0030 02/21/22  0437 02/21/22  0437    138   < > 139   K 4.2 4.4   < > 4.3   BUN 35* 45*   < > 45*   CREA 0.96 1.16*   < > 1.58*   WBC  --   --   --  12.1*   HGB  --   --   --  10.7*   HCT  --   --   --  32.1*   PLT  --   --   --  372    < > = values in this interval not displayed. Lab Results   Component Value Date/Time    Cholesterol, total 76 02/23/2022 03:29 AM    HDL Cholesterol 27 02/23/2022 03:29 AM    LDL, calculated 16.4 02/23/2022 03:29 AM    Triglyceride 163 (H) 02/23/2022 03:29 AM        Patient was ambulating without symptoms prior to d/c. Consults: None  Disposition: home  Discharge Condition: Stable  Patient Instructions:   Current Discharge Medication List      START taking these medications    Details   aspirin 81 mg chewable tablet Take 1 Tablet by mouth daily. Qty: 30 Tablet, Refills: 12      carvediloL (COREG) 6.25 mg tablet Take 1 Tablet by mouth two (2) times daily (with meals). Qty: 60 Tablet, Refills: 12      HYDROcodone-acetaminophen (NORCO) 5-325 mg per tablet Take 1 Tablet by mouth every six (6) hours as needed for Pain for up to 3 days. Max Daily Amount: 4 Tablets. Qty: 10 Tablet, Refills: 0    Associated Diagnoses: Second degree heart block      losartan (COZAAR) 25 mg tablet Take 1 Tablet by mouth daily. Qty: 30 Tablet, Refills: 12      rosuvastatin (CRESTOR) 20 mg tablet Take 1 Tablet by mouth nightly. Qty: 30 Tablet, Refills: 12      spironolactone (ALDACTONE) 25 mg tablet Take 0.5 Tablets by mouth Every morning. Qty: 30 Tablet, Refills: 12      ticagrelor (BRILINTA) 90 mg tablet Take 1 Tablet by mouth every twelve (12) hours every twelve (12) hours. Qty: 60 Tablet, Refills: 12         CONTINUE these medications which have NOT CHANGED    Details   OTHER Morphine 240mg / Aquaphon 160 mg  Ointment for right knee      AZELASTINE/FLUTICASONE (DYMISTA NA) by Nasal route.  As needed      lidocaine (LIDODERM) 5 %(700 mg/patch) 1 Patch by TransDERmal route every twenty-four (24) hours. As needed for pain         STOP taking these medications       lisinopril-hydrochlorothiazide (PRINZIDE, ZESTORETIC) 20-12.5 mg per tablet Comments:   Reason for Stopping:               Referenced discharge instructions provided by nursing for diet and activity.   Follow-up:   Follow-up Information     Follow up With Specialties Details Why Contact Info    Baljit Nguyen MD Cardio Vascular Surgery, Cardiology In 2 weeks  [address]  [id number]  [facility] [address] [facility]      [address]  [address] [address] [phone]              > 30 minutes coordinating discharge x Yes     Signed:  Carolann Brown MD  2/23/2022  8:53 AM

## 2022-02-23 NOTE — PROGRESS NOTES
Problem: Pressure Injury - Risk of  Goal: *Prevention of pressure injury  Description: Document Juan Antonio Scale and appropriate interventions in the flowsheet. Outcome: Progressing Towards Goal  Note: Pressure Injury Interventions:       Moisture Interventions: Absorbent underpads,Limit adult briefs,Minimize layers,Moisture barrier    Activity Interventions: Increase time out of bed,Pressure redistribution bed/mattress(bed type),PT/OT evaluation    Mobility Interventions: Pressure redistribution bed/mattress (bed type),PT/OT evaluation    Nutrition Interventions: Document food/fluid/supplement intake,Discuss nutritional consult with provider    Friction and Shear Interventions: Apply protective barrier, creams and emollients,Foam dressings/transparent film/skin sealants,HOB 30 degrees or less,Lift sheet,Minimize layers                Problem: Patient Education: Go to Patient Education Activity  Goal: Patient/Family Education  Outcome: Progressing Towards Goal     Problem: Falls - Risk of  Goal: *Absence of Falls  Description: Document Chema Fall Risk and appropriate interventions in the flowsheet.   Outcome: Progressing Towards Goal  Note: Fall Risk Interventions:  Mobility Interventions: Bed/chair exit alarm,Communicate number of staff needed for ambulation/transfer,Patient to call before getting OOB         Medication Interventions: Bed/chair exit alarm,Patient to call before getting OOB,Teach patient to arise slowly    Elimination Interventions: Bed/chair exit alarm,Call light in reach,Patient to call for help with toileting needs,Stay With Me (per policy),Toilet paper/wipes in reach,Toileting schedule/hourly rounds              Problem: Patient Education: Go to Patient Education Activity  Goal: Patient/Family Education  Outcome: Progressing Towards Goal     Problem: Patient Education: Go to Patient Education Activity  Goal: Patient/Family Education  Outcome: Progressing Towards Goal     Problem: Unstable angina/NSTEMI: Day of Admission/Day 1  Goal: Off Pathway (Use only if patient is Off Pathway)  Outcome: Progressing Towards Goal  Goal: Activity/Safety  Outcome: Progressing Towards Goal  Goal: Consults, if ordered  Outcome: Progressing Towards Goal  Goal: Diagnostic Test/Procedures  Outcome: Progressing Towards Goal  Goal: Nutrition/Diet  Outcome: Progressing Towards Goal  Goal: Discharge Planning  Outcome: Progressing Towards Goal  Goal: Medications  Outcome: Progressing Towards Goal  Goal: Respiratory  Outcome: Progressing Towards Goal  Goal: Treatments/Interventions/Procedures  Outcome: Progressing Towards Goal  Goal: Psychosocial  Outcome: Progressing Towards Goal  Goal: *Hemodynamically stable  Outcome: Progressing Towards Goal  Goal: *Optimal pain control at patient's stated goal  Outcome: Progressing Towards Goal  Goal: *Lungs clear or at baseline  Outcome: Progressing Towards Goal     Problem: Unstable angina/NSTEMI: Day 2  Goal: Off Pathway (Use only if patient is Off Pathway)  Outcome: Progressing Towards Goal  Goal: Activity/Safety  Outcome: Progressing Towards Goal  Goal: Consults, if ordered  Outcome: Progressing Towards Goal  Goal: Diagnostic Test/Procedures  Outcome: Progressing Towards Goal  Goal: Nutrition/Diet  Outcome: Progressing Towards Goal  Goal: Discharge Planning  Outcome: Progressing Towards Goal  Goal: Medications  Outcome: Progressing Towards Goal  Goal: Respiratory  Outcome: Progressing Towards Goal  Goal: Treatments/Interventions/Procedures  Outcome: Progressing Towards Goal  Goal: Psychosocial  Outcome: Progressing Towards Goal  Goal: *Hemodynamically stable  Outcome: Progressing Towards Goal  Goal: *Optimal pain control at patient's stated goal  Outcome: Progressing Towards Goal  Goal: *Lungs clear or at baseline  Outcome: Progressing Towards Goal     Problem: Unstable Angina/NSTEMI: Discharge Outcomes  Goal: *Hemodynamically stable  Outcome: Progressing Towards Goal  Goal: *Stable cardiac rhythm  Outcome: Progressing Towards Goal  Goal: *Lungs clear or at baseline  Outcome: Progressing Towards Goal  Goal: *Optimal pain control at patient's stated goal  Outcome: Progressing Towards Goal  Goal: *Identifies cardiac risk factors  Outcome: Progressing Towards Goal  Goal: *Verbalizes home exercise program, activity guidelines, cardiac precautions  Outcome: Progressing Towards Goal  Goal: *Verbalizes understanding and describes prescribed diet  Outcome: Progressing Towards Goal  Goal: *Verbalizes name, dosage, time, side effects, and number of days to continue medications  Outcome: Progressing Towards Goal  Goal: *Anxiety reduced or absent  Outcome: Progressing Towards Goal  Goal: *Understands and describes signs and symptoms to report to providers(Stroke Metric)  Outcome: Progressing Towards Goal  Goal: *Describes follow-up/return visits to physicians  Outcome: Progressing Towards Goal  Goal: *Describes available resources and support systems  Outcome: Progressing Towards Goal  Goal: *Influenza immunization  Outcome: Progressing Towards Goal  Goal: *Pneumococcal immunization  Outcome: Progressing Towards Goal  Goal: *Describes smoking cessation resources  Outcome: Progressing Towards Goal     Problem: Patient Education: Go to Patient Education Activity  Goal: Patient/Family Education  Outcome: Progressing Towards Goal     Problem: Pacer/ICD: Pre-Procedure  Goal: Off Pathway (Use only if patient is Off Pathway)  Outcome: Progressing Towards Goal  Goal: Activity/Safety  Outcome: Progressing Towards Goal  Goal: Consults, if ordered  Outcome: Progressing Towards Goal  Goal: Diagnostic Test/Procedures  Outcome: Progressing Towards Goal  Goal: Nutrition/Diet  Outcome: Progressing Towards Goal  Goal: Discharge Planning  Outcome: Progressing Towards Goal  Goal: Medications  Outcome: Progressing Towards Goal  Goal: Respiratory  Outcome: Progressing Towards Goal  Goal: Treatments/Interventions/Procedures  Outcome: Progressing Towards Goal  Goal: Psychosocial  Outcome: Progressing Towards Goal  Goal: *Verbalize description of procedure  Outcome: Progressing Towards Goal  Goal: *Consent signed  Outcome: Progressing Towards Goal     Problem: Pacer/ICD: Post-Procedure  Goal: Off Pathway (Use only if patient is Off Pathway)  Outcome: Progressing Towards Goal  Goal: Activity/Safety  Outcome: Progressing Towards Goal  Goal: Consults, if ordered  Outcome: Progressing Towards Goal  Goal: Diagnostic Test/Procedures  Outcome: Progressing Towards Goal  Goal: Nutrition/Diet  Outcome: Progressing Towards Goal  Goal: Discharge Planning  Outcome: Progressing Towards Goal  Goal: Medications  Outcome: Progressing Towards Goal  Goal: Respiratory  Outcome: Progressing Towards Goal  Goal: Treatments/Interventions/Procedures  Outcome: Progressing Towards Goal  Goal: Psychosocial  Outcome: Progressing Towards Goal  Goal: *Hemodynamically stable  Outcome: Progressing Towards Goal  Goal: *Optimal pain control at patient's stated goal  Outcome: Progressing Towards Goal  Goal: *Absence of signs and symptoms of infection or wound complication  Outcome: Progressing Towards Goal     Problem: Pacer/ICD: Day 1  Goal: Off Pathway (Use only if patient is Off Pathway)  Outcome: Progressing Towards Goal  Goal: Activity/Safety  Outcome: Progressing Towards Goal  Goal: Diagnostic Test/Procedures  Outcome: Progressing Towards Goal  Goal: Nutrition/Diet  Outcome: Progressing Towards Goal  Goal: Discharge Planning  Outcome: Progressing Towards Goal  Goal: Medications  Outcome: Progressing Towards Goal  Goal: Respiratory  Outcome: Progressing Towards Goal  Goal: Treatments/Interventions/Procedures  Outcome: Progressing Towards Goal  Goal: Psychosocial  Outcome: Progressing Towards Goal     Problem: Pacer/ICD: Discharge Outcomes  Goal: *Hemodynamically stable  Outcome: Progressing Towards Goal  Goal: *Stable cardiac rhythm  Outcome: Progressing Towards Goal  Goal: *Lungs clear or at baseline  Outcome: Progressing Towards Goal  Goal: *Demonstrates ability to perform prescribed activity without shortness of breath or discomfort  Outcome: Progressing Towards Goal  Goal: *Verbalizes home exercise program, activity guidelines, cardiac precautions  Outcome: Progressing Towards Goal  Goal: *Verbalizes understanding and describes prescribed diet  Outcome: Progressing Towards Goal  Goal: *Verbalizes understanding and describes medication purposes and frequencies  Outcome: Progressing Towards Goal  Goal: *Identifies cardiac risk factors  Outcome: Progressing Towards Goal  Goal: *No signs and symptoms of infection or wound complications  Outcome: Progressing Towards Goal  Goal: *Anxiety reduced or absent  Outcome: Progressing Towards Goal  Goal: *Understands and describes signs and symptoms to report to providers(Stroke Metric)  Outcome: Progressing Towards Goal  Goal: *Describes follow-up/return visits to physicians  Outcome: Progressing Towards Goal  Goal: *Describes available resources and support systems  Outcome: Progressing Towards Goal  Goal: *Influenza immunization  Outcome: Progressing Towards Goal  Goal: *Pneumococcal immunization  Outcome: Progressing Towards Goal  Goal: *Optimal pain control at patient's stated goal  Outcome: Progressing Towards Goal

## 2022-03-09 ENCOUNTER — HOSPITAL ENCOUNTER (OUTPATIENT)
Dept: CARDIAC REHAB | Age: 72
Discharge: HOME | End: 2022-03-09
Payer: MEDICARE

## 2022-03-09 VITALS — BODY MASS INDEX: 24.49 KG/M2 | WEIGHT: 147 LBS | HEIGHT: 65 IN

## 2022-03-09 PROCEDURE — 93798 PHYS/QHP OP CAR RHAB W/ECG: CPT

## 2022-03-09 NOTE — CARDIO/PULMONARY
[facility]    Cardiac Rehabilitation Program    Intake Appointment  3/9/2022           NAME: Diana Jacob : 1950 AGE: 66 y.o. GENDER: female    CARDIAC REHAB ADMITTING DIAGNOSIS: Non-ST elevation (NSTEMI) myocardial infarction [I21.4]    REFERRING PHYSICIAN: Amira Saldana MD    MEDICAL HX:  Past Medical History:   Diagnosis Date    Arthritis     hands,  left knee    Colon polyps     Hypertension     IBS (irritable bowel syndrome)     colostomy  revresed    Nausea & vomiting     anesthesia related    Other ill-defined conditions(799.89)     seasonal allergies       LABS:     No results found for: HBA1C, OJB7YGPR, [id number]  Lab Results   Component Value Date/Time    Cholesterol, total 76 2022 03:29 AM    HDL Cholesterol 27 2022 03:29 AM    LDL, calculated 16.4 2022 03:29 AM    VLDL, calculated 32.6 2022 03:29 AM    Triglyceride 163 (H) 2022 03:29 AM    CHOL/HDL Ratio 2.8 2022 03:29 AM         MEDICATIONS/SUPPLEMENTS:     Prior to Admission medications    Medication Sig Start Date End Date Taking? Authorizing Provider   aspirin 81 mg chewable tablet Take 1 Tablet by mouth daily. 22  Yes Amira Saldana MD   carvediloL (COREG) 6.25 mg tablet Take 1 Tablet by mouth two (2) times daily (with meals). 22  Yes Amira Saldana MD   losartan (COZAAR) 25 mg tablet Take 1 Tablet by mouth daily. 22  Yes Amira Saldana MD   rosuvastatin (CRESTOR) 20 mg tablet Take 1 Tablet by mouth nightly. 22  Yes Amira Saldana MD   spironolactone (ALDACTONE) 25 mg tablet Take 0.5 Tablets by mouth Every morning. 22  Yes Amira Saldana MD   ticagrelor (BRILINTA) 90 mg tablet Take 1 Tablet by mouth every twelve (12) hours every twelve (12) hours. 22  Yes Amira Saldana MD   AZELASTINE/FLUTICASONE (DYMISTA NA) by Nasal route.  As needed   Yes Provider, Historical   lidocaine (LIDODERM) 5 %(700 mg/patch) 1 Patch by TransDERmal route every twenty-four (24) hours. As needed for pain   Yes Provider, Historical   OTHER Morphine 240mg / Aquaphon 160 mg  Ointment for right knee  Patient not taking: Reported on 3/9/2022    Provider, Historical       ANTHROPOMETRICS:      Ht Readings from Last 1 Encounters:   03/09/22 5' 5\" (1.651 m)      Wt Readings from Last 1 Encounters:   03/09/22 66.7 kg (147 lb)        WAIST: 34    SCREENING SCORES:                       [place]: 7.2  [facility]: 28  Rate Your Plate: 54  Cardiac Knowledge: 8  PHQ-9: 2       VISIT SUMMARY:    Easton Camp 66 y.o. presented to [facility] Cardiac Rehab for program orientation and 6 minute walk test today with a primary diagnosis of Non-ST elevation (NSTEMI) myocardial infarction [I21.4]. Kayley Farrell has a history that includes: gallstones, arthritis, hypertension, irritable bowel syndrome and sedentary lifestyle. Cardiac risk factors include family history, dyslipidemia, stress. EF is 20 %. Kayley Farrell is   and lives with her . PHQ9, depression score, is  2 and this is considered mild. The result was discussed with patient who affirms  score to be accurate and no action was taken. Patient denied chest pain or SOB during 6 minute walk test and was in paced/BBB. Patient walked 260 meters at a speed of 1.8  and grade of 0 for a final MET level of 2.3. Exercise prescription developed around patient stated goals, to be supplemented with home exercise recommendations. Education manual given to patient and reviewed. Patient will attend cardiac rehab 2-3 times/week and also plans to participate in educational classes.         Marita Carmona RN

## 2022-03-11 ENCOUNTER — HOSPITAL ENCOUNTER (OUTPATIENT)
Dept: CARDIAC REHAB | Age: 72
Discharge: HOME | End: 2022-03-11
Payer: MEDICARE

## 2022-03-11 PROCEDURE — 93798 PHYS/QHP OP CAR RHAB W/ECG: CPT

## 2022-03-14 ENCOUNTER — HOSPITAL ENCOUNTER (OUTPATIENT)
Dept: CARDIAC REHAB | Age: 72
Discharge: HOME | End: 2022-03-14
Payer: MEDICARE

## 2022-03-14 VITALS — WEIGHT: 145.8 LBS | BODY MASS INDEX: 24.26 KG/M2

## 2022-03-14 PROCEDURE — 93797 PHYS/QHP OP CAR RHAB WO ECG: CPT

## 2022-03-14 PROCEDURE — 93798 PHYS/QHP OP CAR RHAB W/ECG: CPT

## 2022-03-14 NOTE — CARDIO/PULMONARY
Cardiac Rehab Nutrition Assessment - 1:1 Evaluation         NAME: Sharon Leon : 1950 AGE: 66 y.o. GENDER: female  CARDIAC REHAB ADMITTING DIAGNOSIS: s/p NSTEMI ( maker) on     PROBLEM LIST:  Patient Active Problem List   Diagnosis Code    Gallstone K80.20    [facility]non-ST [facility] I[facility].4         LABS:   No results found for: HBA1C, [id number], [id number]  Lab Results   Component Value Date/Time    Cholesterol, total 76 2022 03:29 AM    HDL Cholesterol 27 2022 03:29 AM    LDL, calculated 16.4 2022 03:29 AM    VLDL, calculated 32.6 2022 03:29 AM    Triglyceride 163 (H) 2022 03:29 AM    CHOL/HDL Ratio 2.8 2022 03:29 AM         MEDICATIONS/SUPPLEMENTS:   Prior to Admission medications    Medication Sig Start Date End Date Taking? Authorizing Provider   aspirin 81 mg chewable tablet Take 1 Tablet by mouth daily. 22   Tyler Escobedo MD   carvediloL (COREG) 6.25 mg tablet Take 1 Tablet by mouth two (2) times daily (with meals). 22   Tyler Escobedo MD   losartan (COZAAR) 25 mg tablet Take 1 Tablet by mouth daily. 22   Tyler Escobedo MD   rosuvastatin (CRESTOR) 20 mg tablet Take 1 Tablet by mouth nightly. 22   Tyler Escobedo MD   spironolactone (ALDACTONE) 25 mg tablet Take 0.5 Tablets by mouth Every morning. 22   Tyler Escobedo MD   ticagrelor (BRILINTA) 90 mg tablet Take 1 Tablet by mouth every twelve (12) hours every twelve (12) hours. 22   Tyler Escobedo MD   OTHER Morphine 240mg / Aquaphon 160 mg  Ointment for right knee  Patient not taking: Reported on 3/9/2022    Provider, Historical   AZELASTINE/FLUTICASONE (DYMISTA NA) by Nasal route. As needed    Provider, Historical   lidocaine (LIDODERM) 5 %(700 mg/patch) 1 Patch by TransDERmal route every twenty-four (24) hours.  As needed for pain    Provider, Historical       ANTHROPOMETRICS:    Ht Readings from Last 1 Encounters:   22 5' 5\" (1.651 m) Wt Readings from Last 10 Encounters:   03/09/22 66.7 kg (147 lb)   02/23/22 68 kg (149 lb 14.6 oz)   09/08/20 65.8 kg (145 lb)   07/20/14 67 kg (147 lb 11.3 oz)   09/27/13 68 kg (150 lb)   09/06/13 67.7 kg (149 lb 5 oz)   08/30/13 69.9 kg (154 lb)   08/12/13 69.4 kg (153 lb)   09/03/12 67.2 kg (148 lb 2 oz)   07/15/10 65.4 kg (144 lb 2 oz)          BMI: 24.46 kg/M2 Category: normal  Waist: 34 inches    Reported Wt Hx: Weight has been stable around 145# which is her UBW. Reported Diet Hx: Goes out once a week with friends.  notes pt and him typically dine out multiple times in a week - cracker barrel is a favorite and [employer]. Admits to not eating many vegetables or fruit and know this is something she and  need to improve. Pt and  are very motivated for change - state they know what to be eating but have not yet put it into practice. Rate Your Plate Score: 54  (Score 58-72: Making many healthy choices; 41-57: Some choices need improving 24-40: many choices need improving)    24 HOUR DIET RECALL  Breakfast Oatmeal    Lunch Tuna fish OR ham sandwiches with lettuce and guzman OR leftovers    Dinner Meat, starch    Snacks    Beverages      [address] [place]     Environmental/Social: Lives with . Does not currently exercise at home. NUTRITION INTERVENTION:  Nutrition 60 minute one-on-one education & goal setting with [address] [place] [address] with Andrzej Stands relevant labs compared to ideals. Reviewed weight history and patient's verbalized weight goal as well as any real or perceived barriers to obtaining the goal. Collaborated with patient to set a specific short and long term weight goal.     Reviewed Rate Your Plate and conducted a verbal diet recall.   Assessed for environmental, financial, psychosocial, physical and comorbidities that may impact the food and eating patterns / behaviors of [facility]. Stadion with patient to set specific nutrient goals as well as specific food / behavior changes that will help patient meet the overall goal of following a heart healthy eating pattern (using guidelines as set forth by the American Heart Association and modeled after healthful eating patterns as recognized by the USDA Dietary Guidelines such as DASH, Mediterranean or plant-based). Briefly reviewed with Uvaldo Bergeron the nutrition information in the Cardiac Rehab patient education book and encouraged Uvaldo Bergeron to read thoroughly, ask questions as needed, and use for future reference for heart healthy nutrition information. Uvaldo Bergeron is scheduled to participate in Cardiac Rehab group nutrition classes. PATIENT GOALS:    Weight Goals: Maintenance    Nutrition Goals:  Daily Recommendations:  Calories: 1500 /day  (using [address] with AF 1.4)    Saturated Fat: no more than 8 g/day  Trans Fat: 0 g/day  Sodium: no more than 2300 mg/day  Fruit: 1-2 cups / day  Vegetables: 2 cups/day    Other:  - read and compare food labels  - add vegetable to dinner 5x/week      Keeping a food diary was recommended. Questions addressed. Follow-up plans discussed. Uvaldo Bergeron verbalized understanding.             Malik Hallman RD

## 2022-03-16 ENCOUNTER — APPOINTMENT (OUTPATIENT)
Dept: CARDIAC REHAB | Age: 72
End: 2022-03-16
Payer: MEDICARE

## 2022-03-18 ENCOUNTER — HOSPITAL ENCOUNTER (OUTPATIENT)
Dept: CARDIAC REHAB | Age: 72
Discharge: HOME | End: 2022-03-18
Payer: MEDICARE

## 2022-03-18 VITALS — BODY MASS INDEX: 24.3 KG/M2 | WEIGHT: 146 LBS

## 2022-03-18 PROCEDURE — 93798 PHYS/QHP OP CAR RHAB W/ECG: CPT

## 2022-03-19 PROBLEM — I21.4 NSTEMI (NON-ST ELEVATED MYOCARDIAL INFARCTION) (HCC): Status: ACTIVE | Noted: 2022-02-19

## 2022-03-21 ENCOUNTER — HOSPITAL ENCOUNTER (OUTPATIENT)
Dept: CARDIAC REHAB | Age: 72
Discharge: HOME | End: 2022-03-21
Payer: MEDICARE

## 2022-03-21 VITALS — BODY MASS INDEX: 24.4 KG/M2 | WEIGHT: 146.6 LBS

## 2022-03-21 PROCEDURE — 93797 PHYS/QHP OP CAR RHAB WO ECG: CPT

## 2022-03-21 PROCEDURE — 93798 PHYS/QHP OP CAR RHAB W/ECG: CPT

## 2022-03-23 ENCOUNTER — HOSPITAL ENCOUNTER (OUTPATIENT)
Dept: CARDIAC REHAB | Age: 72
Discharge: HOME | End: 2022-03-23
Payer: MEDICARE

## 2022-03-23 VITALS — BODY MASS INDEX: 24.46 KG/M2 | WEIGHT: 147 LBS

## 2022-03-23 PROCEDURE — 93798 PHYS/QHP OP CAR RHAB W/ECG: CPT

## 2022-03-25 ENCOUNTER — HOSPITAL ENCOUNTER (OUTPATIENT)
Dept: CARDIAC REHAB | Age: 72
Discharge: HOME | End: 2022-03-25
Payer: MEDICARE

## 2022-03-25 VITALS — BODY MASS INDEX: 24.26 KG/M2 | WEIGHT: 145.8 LBS

## 2022-03-25 PROCEDURE — 93798 PHYS/QHP OP CAR RHAB W/ECG: CPT

## 2022-03-28 ENCOUNTER — HOSPITAL ENCOUNTER (OUTPATIENT)
Dept: CARDIAC REHAB | Age: 72
Discharge: HOME | End: 2022-03-28
Payer: MEDICARE

## 2022-03-28 VITALS — BODY MASS INDEX: 24.79 KG/M2 | WEIGHT: 149 LBS

## 2022-03-28 PROCEDURE — 93798 PHYS/QHP OP CAR RHAB W/ECG: CPT

## 2022-03-28 PROCEDURE — 93797 PHYS/QHP OP CAR RHAB WO ECG: CPT

## 2022-03-30 ENCOUNTER — HOSPITAL ENCOUNTER (OUTPATIENT)
Dept: CARDIAC REHAB | Age: 72
Discharge: HOME | End: 2022-03-30
Payer: MEDICARE

## 2022-03-30 VITALS — BODY MASS INDEX: 24.63 KG/M2 | WEIGHT: 148 LBS

## 2022-03-30 PROCEDURE — 93798 PHYS/QHP OP CAR RHAB W/ECG: CPT

## 2022-04-01 ENCOUNTER — HOSPITAL ENCOUNTER (OUTPATIENT)
Dept: CARDIAC REHAB | Age: 72
Discharge: HOME | End: 2022-04-01
Payer: MEDICARE

## 2022-04-01 VITALS — BODY MASS INDEX: 24.79 KG/M2 | WEIGHT: 149 LBS

## 2022-04-01 PROCEDURE — 93798 PHYS/QHP OP CAR RHAB W/ECG: CPT

## 2022-04-04 ENCOUNTER — HOSPITAL ENCOUNTER (OUTPATIENT)
Dept: CARDIAC REHAB | Age: 72
Discharge: HOME | End: 2022-04-04
Payer: MEDICARE

## 2022-04-04 VITALS — BODY MASS INDEX: 24.79 KG/M2 | WEIGHT: 149 LBS

## 2022-04-04 PROCEDURE — 93798 PHYS/QHP OP CAR RHAB W/ECG: CPT

## 2022-04-04 PROCEDURE — 93797 PHYS/QHP OP CAR RHAB WO ECG: CPT

## 2022-04-06 ENCOUNTER — HOSPITAL ENCOUNTER (OUTPATIENT)
Dept: CARDIAC REHAB | Age: 72
Discharge: HOME | End: 2022-04-06
Payer: MEDICARE

## 2022-04-06 VITALS — BODY MASS INDEX: 24.63 KG/M2 | WEIGHT: 148 LBS

## 2022-04-06 PROCEDURE — 93798 PHYS/QHP OP CAR RHAB W/ECG: CPT

## 2022-04-08 ENCOUNTER — HOSPITAL ENCOUNTER (OUTPATIENT)
Dept: CARDIAC REHAB | Age: 72
Discharge: HOME | End: 2022-04-08
Payer: MEDICARE

## 2022-04-08 VITALS — BODY MASS INDEX: 24.46 KG/M2 | WEIGHT: 147 LBS

## 2022-04-08 PROCEDURE — 93798 PHYS/QHP OP CAR RHAB W/ECG: CPT

## 2022-04-11 ENCOUNTER — HOSPITAL ENCOUNTER (OUTPATIENT)
Dept: CARDIAC REHAB | Age: 72
Discharge: HOME | End: 2022-04-11
Payer: MEDICARE

## 2022-04-11 VITALS — WEIGHT: 149 LBS | BODY MASS INDEX: 24.79 KG/M2

## 2022-04-11 PROCEDURE — 93798 PHYS/QHP OP CAR RHAB W/ECG: CPT

## 2022-04-11 PROCEDURE — 93797 PHYS/QHP OP CAR RHAB WO ECG: CPT

## 2022-04-13 ENCOUNTER — HOSPITAL ENCOUNTER (OUTPATIENT)
Dept: CARDIAC REHAB | Age: 72
Discharge: HOME | End: 2022-04-13
Payer: MEDICARE

## 2022-04-13 VITALS — WEIGHT: 147 LBS | BODY MASS INDEX: 24.46 KG/M2

## 2022-04-13 PROCEDURE — 93798 PHYS/QHP OP CAR RHAB W/ECG: CPT

## 2022-04-15 ENCOUNTER — HOSPITAL ENCOUNTER (OUTPATIENT)
Dept: CARDIAC REHAB | Age: 72
Discharge: HOME | End: 2022-04-15
Payer: MEDICARE

## 2022-04-15 VITALS — BODY MASS INDEX: 24.46 KG/M2 | WEIGHT: 147 LBS

## 2022-04-15 PROCEDURE — 93798 PHYS/QHP OP CAR RHAB W/ECG: CPT

## 2022-04-18 ENCOUNTER — HOSPITAL ENCOUNTER (OUTPATIENT)
Dept: CARDIAC REHAB | Age: 72
Discharge: HOME | End: 2022-04-18
Payer: MEDICARE

## 2022-04-18 VITALS — BODY MASS INDEX: 24.96 KG/M2 | WEIGHT: 150 LBS

## 2022-04-18 PROCEDURE — 93798 PHYS/QHP OP CAR RHAB W/ECG: CPT

## 2022-04-18 PROCEDURE — 93797 PHYS/QHP OP CAR RHAB WO ECG: CPT

## 2022-04-20 ENCOUNTER — APPOINTMENT (OUTPATIENT)
Dept: CARDIAC REHAB | Age: 72
End: 2022-04-20
Payer: MEDICARE

## 2022-04-22 ENCOUNTER — HOSPITAL ENCOUNTER (OUTPATIENT)
Dept: CARDIAC REHAB | Age: 72
Discharge: HOME | End: 2022-04-22
Payer: MEDICARE

## 2022-04-22 VITALS — WEIGHT: 147 LBS | BODY MASS INDEX: 24.46 KG/M2

## 2022-04-22 PROCEDURE — 93798 PHYS/QHP OP CAR RHAB W/ECG: CPT

## 2022-04-25 ENCOUNTER — HOSPITAL ENCOUNTER (OUTPATIENT)
Dept: CARDIAC REHAB | Age: 72
Discharge: HOME | End: 2022-04-25
Payer: MEDICARE

## 2022-04-25 VITALS — WEIGHT: 146 LBS | BODY MASS INDEX: 24.3 KG/M2

## 2022-04-25 PROCEDURE — 93798 PHYS/QHP OP CAR RHAB W/ECG: CPT

## 2022-04-25 PROCEDURE — 93797 PHYS/QHP OP CAR RHAB WO ECG: CPT

## 2022-04-29 ENCOUNTER — HOSPITAL ENCOUNTER (OUTPATIENT)
Dept: CARDIAC REHAB | Age: 72
Discharge: HOME | End: 2022-04-29
Payer: MEDICARE

## 2022-04-29 VITALS — BODY MASS INDEX: 24.3 KG/M2 | WEIGHT: 146 LBS

## 2022-04-29 PROCEDURE — 93798 PHYS/QHP OP CAR RHAB W/ECG: CPT

## 2022-05-02 ENCOUNTER — HOSPITAL ENCOUNTER (OUTPATIENT)
Dept: CARDIAC REHAB | Age: 72
Discharge: HOME | End: 2022-05-02
Payer: MEDICARE

## 2022-05-02 VITALS — BODY MASS INDEX: 24.3 KG/M2 | WEIGHT: 146 LBS

## 2022-05-02 PROCEDURE — 93798 PHYS/QHP OP CAR RHAB W/ECG: CPT

## 2022-05-02 PROCEDURE — 93797 PHYS/QHP OP CAR RHAB WO ECG: CPT

## 2022-05-04 ENCOUNTER — HOSPITAL ENCOUNTER (OUTPATIENT)
Dept: CARDIAC REHAB | Age: 72
Discharge: HOME | End: 2022-05-04
Payer: MEDICARE

## 2022-05-04 VITALS — WEIGHT: 147 LBS | BODY MASS INDEX: 24.46 KG/M2

## 2022-05-04 PROCEDURE — 93798 PHYS/QHP OP CAR RHAB W/ECG: CPT

## 2022-05-06 ENCOUNTER — APPOINTMENT (OUTPATIENT)
Dept: CARDIAC REHAB | Age: 72
End: 2022-05-06
Payer: MEDICARE

## 2022-05-09 ENCOUNTER — HOSPITAL ENCOUNTER (OUTPATIENT)
Dept: CARDIAC REHAB | Age: 72
Discharge: HOME | End: 2022-05-09
Payer: MEDICARE

## 2022-05-09 VITALS — BODY MASS INDEX: 24.46 KG/M2 | WEIGHT: 147 LBS

## 2022-05-09 PROCEDURE — 93797 PHYS/QHP OP CAR RHAB WO ECG: CPT

## 2022-05-09 PROCEDURE — 93798 PHYS/QHP OP CAR RHAB W/ECG: CPT

## 2022-05-11 ENCOUNTER — HOSPITAL ENCOUNTER (OUTPATIENT)
Dept: CARDIAC REHAB | Age: 72
Discharge: HOME | End: 2022-05-11
Payer: MEDICARE

## 2022-05-11 VITALS — BODY MASS INDEX: 24.3 KG/M2 | WEIGHT: 146 LBS

## 2022-05-11 PROCEDURE — 93798 PHYS/QHP OP CAR RHAB W/ECG: CPT

## 2022-05-13 ENCOUNTER — HOSPITAL ENCOUNTER (OUTPATIENT)
Dept: CARDIAC REHAB | Age: 72
Discharge: HOME | End: 2022-05-13
Payer: MEDICARE

## 2022-05-13 VITALS — BODY MASS INDEX: 24.46 KG/M2 | WEIGHT: 147 LBS

## 2022-05-13 PROCEDURE — 93798 PHYS/QHP OP CAR RHAB W/ECG: CPT

## 2022-05-16 ENCOUNTER — HOSPITAL ENCOUNTER (OUTPATIENT)
Dept: CARDIAC REHAB | Age: 72
Discharge: HOME | End: 2022-05-16
Payer: MEDICARE

## 2022-05-16 ENCOUNTER — APPOINTMENT (OUTPATIENT)
Dept: CARDIAC REHAB | Age: 72
End: 2022-05-16
Payer: MEDICARE

## 2022-05-16 VITALS — WEIGHT: 147 LBS | BODY MASS INDEX: 24.46 KG/M2

## 2022-05-16 PROCEDURE — 93798 PHYS/QHP OP CAR RHAB W/ECG: CPT

## 2022-05-16 NOTE — CARDIO/PULMONARY
CP REHAB EXIT NOTE     Patient completed 36/36 visits. She was independent in his exercise routine and progressed well while in rehab. Mrs. Camp improved her [place] Activity from 7.2 to a 18.95, [place] improved by 11 points, Rate Your Plate improved 13 points, and PHQ 9 score went to a zero. Patient was also able to increase her 6 MWT from 260 meters to 402 meters and his METS improved from a 2.4 to 4.1. Mrs. Camp uses her elliptical and treadmill at home 3x/week for 30 mins and walks for 30 mins on opposite days.

## 2022-05-18 ENCOUNTER — APPOINTMENT (OUTPATIENT)
Dept: CARDIAC REHAB | Age: 72
End: 2022-05-18
Payer: MEDICARE

## 2022-05-23 ENCOUNTER — APPOINTMENT (OUTPATIENT)
Dept: CARDIAC REHAB | Age: 72
End: 2022-05-23
Payer: MEDICARE

## 2022-07-18 NOTE — H&P
Colon and Rectal Surgery History and Physical    Subjective:      Jamaal Omer is a 69 y.o. female who had guaiac + stool. Patient Active Problem List    Diagnosis Date Noted    Gallstone 08/12/2013     Past Medical History:   Diagnosis Date    Arthritis     hands,  left knee    Colon polyps     Hypertension     IBS (irritable bowel syndrome)     colostomy  revresed    Nausea & vomiting     anesthesia related    Other ill-defined conditions(799.89)     seasonal allergies      Past Surgical History:   Procedure Laterality Date    ABDOMEN SURGERY PROC UNLISTED  3/10    sigmoid colectomy (colostomy)-reversed    HX APPENDECTOMY      HX CHOLECYSTECTOMY  09/06/13    [facility]    HX DILATION AND CURETTAGE      HX HYSTERECTOMY      RSO    HX MOHS PROCEDURES      right    HX ORTHOPAEDIC  [id number]    \"removal right kneecap\"--\"infection\"    HX ORTHOPAEDIC      right knee bursa    HX OTHER SURGICAL  2010    colonoscopy-polyps --perforated sigmoid     HX PELVIC LAPAROSCOPY      HX TONSILLECTOMY        Social History     Tobacco Use    Smoking status: Never Smoker    Smokeless tobacco: Never Used   Substance Use Topics    Alcohol use: No      Family History   Problem Relation Age of Onset    Heart Failure Mother     Heart Attack Father     Diabetes Father     Heart Failure Brother       Prior to Admission medications    Medication Sig Start Date End Date Taking? Authorizing Provider   OTHER Morphine 240mg / Aquaphon 160 mg  Ointment for right knee   Yes Provider, Historical   AZELASTINE/FLUTICASONE (DYMISTA NA) by Nasal route. As needed   Yes Provider, Historical   lidocaine (LIDODERM) 5 %(700 mg/patch) 1 Patch by TransDERmal route every twenty-four (24) hours. As needed for pain   Yes Provider, Historical   lisinopril-hydrochlorothiazide (PRINZIDE, ZESTORETIC) 20-12.5 mg per tablet Take 1 Tab by mouth every morning.    Yes Other, MD Brennon     Allergies   Allergen Reactions    Adhesive Tape Rash     Severe rash if adhesive on for more than one day    Scopolamine Other (comments)     Had scopolamine patch when removed area looked burned and skin red and irritated    Codeine Nausea and Vomiting    Sulfa (Sulfonamide Antibiotics) Nausea and Vomiting    Azithromycin Rash    Ceclor [Cefaclor] Nausea and Vomiting    Trazodone Other (comments)     dizzy    Zolpidem Other (comments)     dizzy        Review of Systems:    A comprehensive review of systems was negative except for that written in the History of Present Illness. Objective:     Visit Vitals  /59   Pulse 82   Temp 98 °F (36.7 °C)   Resp 19   Ht 5' 5\" (1.651 m)   Wt 65.8 kg (145 lb)   SpO2 100%   Breastfeeding No   BMI 24.13 kg/m²        Physical Exam:   General:  Alert, cooperative, no distress, appears stated age. Head:  Normocephalic, without obvious abnormality, atraumatic. Eyes:  Conjunctivae/corneas clear. PERRL, EOMs intact. Ears:  Normal external ear canals both ears. Nose: Nares normal. Septum midline. No drainage. Throat: Lips, mucosa, and tongue normal. Teeth and gums normal.   Neck: Supple, symmetrical, trachea midline, no adenopathy   Back:   Symmetric, no curvature. ROM normal.   Lungs:   Clear   Chest wall:  No tenderness or deformity. Heart:  Regular rate and rhythm       Abdomen:   Soft, non-tender. Bowel sounds normal. No masses,  No organomegaly. Genitalia:  deferred       Extremities: Extremities normal, atraumatic, no cyanosis or edema. Skin: Skin color, texture, turgor normal. No rashes or lesions. Neurologic: CNII-XII intact. Normal strength, sensation and reflexes throughout. Imaging:  images and reports reviewed    Lab Review:  No results found for this or any previous visit (from the past 24 hour(s)). Labs and radiology: images and reports reviewed      Assessment:     Hx charanjit. Guaiac + stool     Plan:     1.  I recommend proceeding with colonoscopy. Treatment alternatives were discussed. 2. Discussed aspects of surgical intervention, methods, risks (including by not limited to infection, bleeding, hematoma, and perforation of the intestines or solid organs), and the risks of general anesthetic. The patient understands the risks; any and all questions were answered to the patient's satisfaction.     Signed By: Naina Shaw MD     September 8, 2020 Suturegard Intro: Intraoperative tissue expansion was performed, utilizing the SUTUREGARD device, in order to reduce wound tension.

## 2022-11-06 ENCOUNTER — HOSPITAL ENCOUNTER (EMERGENCY)
Age: 72
Discharge: HOME | End: 2022-11-06
Attending: EMERGENCY MEDICINE
Payer: MEDICARE

## 2022-11-06 VITALS
TEMPERATURE: 98.1 F | RESPIRATION RATE: 16 BRPM | DIASTOLIC BLOOD PRESSURE: 71 MMHG | OXYGEN SATURATION: 98 % | SYSTOLIC BLOOD PRESSURE: 134 MMHG | HEART RATE: 74 BPM

## 2022-11-06 DIAGNOSIS — M79.671 PAIN OF RIGHT HEEL: Primary | ICD-10-CM

## 2022-11-06 PROCEDURE — 99283 EMERGENCY DEPT VISIT LOW MDM: CPT

## 2022-11-06 RX ORDER — HYDROCODONE BITARTRATE AND ACETAMINOPHEN 5; 325 MG/1; MG/1
1 TABLET ORAL
Qty: 9 TABLET | Refills: 0 | Status: SHIPPED | OUTPATIENT
Start: 2022-11-06 | End: 2022-11-09

## 2022-11-06 NOTE — ED PROVIDER NOTES
EMERGENCY DEPARTMENT HISTORY AND PHYSICAL EXAM      Please note that this dictation was completed with [employer], the computer voice recognition software. Quite often unanticipated grammatical, syntax, homophones, and other interpretive errors are inadvertently transcribed by the computer software. Please disregard these errors. Please excuse any errors that have escaped final proofreading. Date: 11/6/2022  Patient Name: Charly Nix    History of Presenting Illness     Chief Complaint   Patient presents with    Foot Pain     Ambulatory into triage with cc of right heel pain beginning yesterday, pt denies new medications, trauma, extensive walking       History Provided By: Patient and Patient's     HPI: Charly Nix, 67 y.o. female with history of decreased renal function and others as listed below presents ambulatory with her  to the ED with cc of right heel pain that started yesterday after taking off her shoe. Patient tells us that the pain is in the back of her heel and is made worse with walking. Reports pain radiates up the back of her ankle. Pain is reduced with pressure including shoe wearing. Denies fall or other trauma and states this has not happened in the past.  Denies recent sick symptoms including N/V/D, cough, fever, or others. Patient states that she is able to ambulate and has a slight limp due to a surgery she had in 2009. Chief Complaint: R heel pain  Duration: 1 Days  Timing:  Acute  Location: Posterolateral heel, right  Quality: Sharp and Pressure  Severity: Moderate  Modifying Factors: Better with shoe on, worse with walking  Associated Symptoms: denies any other associated signs or symptoms    There are no other complaints, changes, or physical findings at this time.     PCP: Pierce Farfan MD    Current Outpatient Medications   Medication Sig Dispense Refill    HYDROcodone-acetaminophen (NORCO) 5-325 mg per tablet Take 1 Tablet by mouth every eight (8) hours as needed for Pain for up to 3 days. Max Daily Amount: 3 Tablets. 9 Tablet 0    aspirin 81 mg chewable tablet Take 1 Tablet by mouth daily. 30 Tablet 12    carvediloL (COREG) 6.25 mg tablet Take 1 Tablet by mouth two (2) times daily (with meals). 60 Tablet 12    losartan (COZAAR) 25 mg tablet Take 1 Tablet by mouth daily. 30 Tablet 12    rosuvastatin (CRESTOR) 20 mg tablet Take 1 Tablet by mouth nightly. 30 Tablet 12    spironolactone (ALDACTONE) 25 mg tablet Take 0.5 Tablets by mouth Every morning. 30 Tablet 12    ticagrelor (BRILINTA) 90 mg tablet Take 1 Tablet by mouth every twelve (12) hours every twelve (12) hours. 60 Tablet 12    OTHER Morphine 240mg / Aquaphon 160 mg  Ointment for right knee (Patient not taking: Reported on 3/9/2022)      AZELASTINE/FLUTICASONE (DYMISTA NA) by Nasal route. As needed      lidocaine (LIDODERM) 5 %(700 mg/patch) 1 Patch by TransDERmal route every twenty-four (24) hours.  As needed for pain       Past History     Past Medical History:  Past Medical History:   Diagnosis Date    Arthritis     hands,  left knee    Colon polyps     Hypertension     IBS (irritable bowel syndrome)     colostomy  revresed    Nausea & vomiting     anesthesia related    Other ill-defined conditions(799.89)     seasonal allergies       Past Surgical History:  Past Surgical History:   Procedure Laterality Date    COLONOSCOPY N/A 9/8/2020    COLONOSCOPY performed by Shar Terrazas MD at [facility] ENDOSCOPY    HX APPENDECTOMY      HX CHOLECYSTECTOMY  09/06/13    Laparoscopic Cholecystectomty @ [facility]    HX DILATION AND CURETTAGE      HX HYSTERECTOMY      RSO    HX MOHS PROCEDURES      right    HX ORTHOPAEDIC  5640-3507    \"removal right kneecap\"--\"infection\"    HX ORTHOPAEDIC      right knee bursa    HX OTHER SURGICAL  2010    colonoscopy-polyps --perforated sigmoid     HX PELVIC LAPAROSCOPY      HX TONSILLECTOMY      MA ABDOMEN SURGERY PROC UNLISTED  3/10    sigmoid colectomy (colostomy)-reversed       Family History:  Family History   Problem Relation Age of Onset    Heart Failure Mother     Heart Attack Father     Diabetes Father     Heart Failure Brother        Social History:  Social History     Tobacco Use    Smoking status: Never    Smokeless tobacco: Never   Substance Use Topics    Alcohol use: No    Drug use: No     Types: Prescription, OTC       Allergies: Allergies   Allergen Reactions    Adhesive Tape Rash     Severe rash if adhesive on for more than one day    Scopolamine Other (comments)     Had scopolamine patch when removed area looked burned and skin red and irritated    Codeine Nausea and Vomiting    Sulfa (Sulfonamide Antibiotics) Nausea and Vomiting    Azithromycin Rash    Ceclor [Cefaclor] Nausea and Vomiting    Trazodone Other (comments)     dizzy    Zolpidem Other (comments)     dizzy    Diclofenac Other (comments)     Avoid due to Renal Dysfunction      Review of Systems   Review of Systems   Constitutional:  Negative for fever. HENT:  Negative for sore throat. Eyes:  Negative for pain. Respiratory:  Negative for shortness of breath. Cardiovascular:  Negative for chest pain. Gastrointestinal:  Negative for abdominal pain. Genitourinary:  Negative for flank pain. Musculoskeletal:  Positive for gait problem (R leg - had patella removed in 2009) and myalgias (R heel pain). Negative for back pain. Skin:  Negative for rash. Neurological:  Negative for headaches. Physical Exam   Physical Exam  Vitals and nursing note reviewed. Constitutional:       General: She is not in acute distress. Appearance: She is well-developed. She is not toxic-appearing. HENT:      Head: Normocephalic and atraumatic. No right periorbital erythema or left periorbital erythema. Right Ear: External ear normal.      Left Ear: External ear normal.      Nose: Nose normal.      Mouth/Throat:      Mouth: Mucous membranes are moist.   Eyes:      General: No scleral icterus.         Right eye: No discharge. Left eye: No discharge. Conjunctiva/sclera: Conjunctivae normal.      Pupils: Pupils are equal, round, and reactive to light. Cardiovascular:      Rate and Rhythm: Normal rate. Pulmonary:      Effort: Pulmonary effort is normal. No respiratory distress. Abdominal:      Palpations: Abdomen is soft. Tenderness: There is no abdominal tenderness. Musculoskeletal:         General: Normal range of motion. Cervical back: Normal range of motion. Feet:    Feet:      Right foot:      Skin integrity: Skin integrity normal. No ulcer, blister, erythema or warmth. Toenail Condition: Right toenails are normal.      Comments: RIGHT ANKLE/FOOT:  5 toes present. Cecille deformity. No edema, erythema or heat. Full ROM; sensation intact. Skin warm and dry. Tenderness to palpation distal to the lateral malleolus and over the posterolateral calcaneus. Soares squeeze elicits plantar flexion. Skin:     Findings: No rash. Neurological:      Mental Status: She is alert and oriented to person, place, and time. Cranial Nerves: No cranial nerve deficit. Sensory: No sensory deficit. Psychiatric:         Speech: Speech normal.     Diagnostic Study Results     Labs -   No results found for this or any previous visit (from the past 12 hour(s)). Radiologic Studies -   No orders to display     CT Results  (Last 48 hours)      None          CXR Results  (Last 48 hours)      None          Medical Decision Making   I am the first provider for this patient. I reviewed the vital signs, available nursing notes, past medical history, past surgical history, family history and social history. Vital Signs-Reviewed the patient's vital signs.   Patient Vitals for the past 12 hrs:   Temp Pulse Resp BP SpO2   11/06/22 0958 98.1 °F (36.7 °C) 74 16 134/71 98 %       Pulse Oximetry Analysis - 98% on RA    Records Reviewed: Nursing Notes and Old Medical Records    Provider Notes (Medical Decision Making):   DDx: Plantar fasciitis, achilles tendinopathy, arthritis, retrocalcaneal bursitis    Afebrile and well-appearing. Patient presents with right lateral posterior heel pain after 1 day. There was no injury. 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 Soares squeeze elicits plantar flexion and there is no pain along the Achilles tendon. She does have mild tenderness of the posterior lateral heel without redness or swelling. She is able to walk unassisted. Additional testing deferred. She is anticoagulated with Brilinta and for this reason I will prescribe Lortab for her pain. Refer to orthopedics. An Ace wrap is applied for comfort. Return precautions for worsening symptoms or any concerns. ED Course:   Initial assessment performed. The patients presenting problems have been discussed, and they are in agreement with the care plan formulated and outlined with them. I have encouraged them to ask questions as they arise throughout their visit. Disposition:  Discharge. PLAN:  1. Discharge Medication List as of 11/6/2022 12:24 PM        START taking these medications    Details   HYDROcodone-acetaminophen (NORCO) 5-325 mg per tablet Take 1 Tablet by mouth every eight (8) hours as needed for Pain for up to 3 days. Max Daily Amount: 3 Tablets., Normal, Disp-9 Tablet, R-0           CONTINUE these medications which have NOT CHANGED    Details   aspirin 81 mg chewable tablet Take 1 Tablet by mouth daily. , No Print, Disp-30 Tablet, R-12      carvediloL (COREG) 6.25 mg tablet Take 1 Tablet by mouth two (2) times daily (with meals). , Print, Disp-60 Tablet, R-12      losartan (COZAAR) 25 mg tablet Take 1 Tablet by mouth daily. , Print, Disp-30 Tablet, R-12      rosuvastatin (CRESTOR) 20 mg tablet Take 1 Tablet by mouth nightly. , Print, Disp-30 Tablet, R-12      spironolactone (ALDACTONE) 25 mg tablet Take 0.5 Tablets by mouth Every morning., Print, Disp-30 Tablet, R-12      ticagrelor (BRILINTA) 90 mg tablet Take 1 Tablet by mouth every twelve (12) hours every twelve (12) hours. , Print, Disp-60 Tablet, R-12      OTHER Morphine 240mg / Aquaphon 160 mg  Ointment for right knee, Historical Med      AZELASTINE/FLUTICASONE (DYMISTA NA) by Nasal route. As needed, Historical Med      lidocaine (LIDODERM) 5 %(700 mg/patch) 1 Patch by TransDERmal route every twenty-four (24) hours. As needed for pain, Historical Med           2. Follow-up Information       Follow up With Specialties Details Why Contact Info    Katlin Cartagena MD Orthopedic Surgery Call  ORTHO: as needed if symptoms persist [address]  [phone]            Return to ED if worse     Diagnosis     Clinical Impression:   1. Pain of right heel              Attestations: This note is prepared in part by EDMOND NEGRETE, during her clinical rotation. The Physician Assistant student's documentation has been prepared under my direction and personally reviewed by me in its entirety. I confirm that the note above accurately reflects all work, treatment, procedures, and medical decision making performed by me.     PURNIMA Guzman

## 2024-04-23 ENCOUNTER — APPOINTMENT (OUTPATIENT)
Facility: HOSPITAL | Age: 74
End: 2024-04-23
Payer: MEDICARE

## 2024-04-23 ENCOUNTER — HOSPITAL ENCOUNTER (INPATIENT)
Facility: HOSPITAL | Age: 74
LOS: 2 days | Discharge: HOME | End: 2024-04-25
Attending: SURGERY | Admitting: SURGERY
Payer: MEDICARE

## 2024-04-23 DIAGNOSIS — K56.600 PARTIAL INTESTINAL OBSTRUCTION, UNSPECIFIED CAUSE (HCC): Primary | ICD-10-CM

## 2024-04-23 PROBLEM — K56.609 SBO (SMALL BOWEL OBSTRUCTION) (HCC): Status: ACTIVE | Noted: 2024-04-23

## 2024-04-23 LAB
ALBUMIN SERPL-MCNC: 4.9 G/DL (ref 3.5–5)
ALBUMIN/GLOB SERPL: 1.2 (ref 1.1–2.2)
ALP SERPL-CCNC: 111 U/L (ref 45–117)
ALT SERPL-CCNC: 24 U/L (ref 12–78)
ANION GAP SERPL CALC-SCNC: 6 MMOL/L (ref 5–15)
APPEARANCE UR: CLEAR
AST SERPL-CCNC: 22 U/L (ref 15–37)
BACTERIA URNS QL MICRO: NEGATIVE /HPF
BASOPHILS # BLD: 0.1 K/UL (ref 0–0.1)
BASOPHILS NFR BLD: 0 % (ref 0–1)
BILIRUB SERPL-MCNC: 0.8 MG/DL (ref 0.2–1)
BILIRUB UR QL: NEGATIVE
BUN SERPL-MCNC: 31 MG/DL (ref 6–20)
BUN/CREAT SERPL: 25 (ref 12–20)
CALCIUM SERPL-MCNC: 10.5 MG/DL (ref 8.5–10.1)
CHLORIDE SERPL-SCNC: 104 MMOL/L (ref 97–108)
CO2 SERPL-SCNC: 27 MMOL/L (ref 21–32)
COLOR UR: ABNORMAL
CREAT SERPL-MCNC: 1.23 MG/DL (ref 0.55–1.02)
DIFFERENTIAL METHOD BLD: ABNORMAL
EOSINOPHIL # BLD: 0 K/UL (ref 0–0.4)
EOSINOPHIL NFR BLD: 0 % (ref 0–7)
EPITH CASTS URNS QL MICRO: ABNORMAL /LPF
ERYTHROCYTE [DISTWIDTH] IN BLOOD BY AUTOMATED COUNT: 14.8 % (ref 11.5–14.5)
GLOBULIN SER CALC-MCNC: 4 G/DL (ref 2–4)
GLUCOSE SERPL-MCNC: 181 MG/DL (ref 65–100)
GLUCOSE UR STRIP.AUTO-MCNC: 500 MG/DL
HCT VFR BLD AUTO: 45.8 % (ref 35–47)
HGB BLD-MCNC: 14.6 G/DL (ref 11.5–16)
HGB UR QL STRIP: NEGATIVE
HYALINE CASTS URNS QL MICRO: ABNORMAL /LPF (ref 0–5)
IMM GRANULOCYTES # BLD AUTO: 0.1 K/UL (ref 0–0.04)
IMM GRANULOCYTES NFR BLD AUTO: 1 % (ref 0–0.5)
KETONES UR QL STRIP.AUTO: 15 MG/DL
LEUKOCYTE ESTERASE UR QL STRIP.AUTO: NEGATIVE
LIPASE SERPL-CCNC: 67 U/L (ref 13–75)
LYMPHOCYTES # BLD: 1.2 K/UL (ref 0.8–3.5)
LYMPHOCYTES NFR BLD: 8 % (ref 12–49)
MCH RBC QN AUTO: 26.5 PG (ref 26–34)
MCHC RBC AUTO-ENTMCNC: 31.9 G/DL (ref 30–36.5)
MCV RBC AUTO: 83.1 FL (ref 80–99)
MONOCYTES # BLD: 0.7 K/UL (ref 0–1)
MONOCYTES NFR BLD: 4 % (ref 5–13)
MUCOUS THREADS URNS QL MICRO: ABNORMAL /LPF
NEUTS SEG # BLD: 13.4 K/UL (ref 1.8–8)
NEUTS SEG NFR BLD: 87 % (ref 32–75)
NITRITE UR QL STRIP.AUTO: NEGATIVE
NRBC # BLD: 0 K/UL (ref 0–0.01)
NRBC BLD-RTO: 0 PER 100 WBC
PH UR STRIP: 5.5 (ref 5–8)
PLATELET # BLD AUTO: 346 K/UL (ref 150–400)
PMV BLD AUTO: 8.7 FL (ref 8.9–12.9)
POTASSIUM SERPL-SCNC: 4.3 MMOL/L (ref 3.5–5.1)
PROT SERPL-MCNC: 8.9 G/DL (ref 6.4–8.2)
PROT UR STRIP-MCNC: 30 MG/DL
RBC # BLD AUTO: 5.51 M/UL (ref 3.8–5.2)
RBC #/AREA URNS HPF: ABNORMAL /HPF (ref 0–5)
SODIUM SERPL-SCNC: 137 MMOL/L (ref 136–145)
SP GR UR REFRACTOMETRY: 1.01 (ref 1–1.03)
UROBILINOGEN UR QL STRIP.AUTO: 1 EU/DL (ref 0.2–1)
WBC # BLD AUTO: 15.4 K/UL (ref 3.6–11)
WBC URNS QL MICRO: ABNORMAL /HPF (ref 0–4)

## 2024-04-23 PROCEDURE — 6360000004 HC RX CONTRAST MEDICATION

## 2024-04-23 PROCEDURE — 6360000002 HC RX W HCPCS

## 2024-04-23 PROCEDURE — 85025 COMPLETE CBC W/AUTO DIFF WBC: CPT

## 2024-04-23 PROCEDURE — 6370000000 HC RX 637 (ALT 250 FOR IP): Performed by: SURGERY

## 2024-04-23 PROCEDURE — 6360000002 HC RX W HCPCS: Performed by: SURGERY

## 2024-04-23 PROCEDURE — 74018 RADEX ABDOMEN 1 VIEW: CPT

## 2024-04-23 PROCEDURE — 83690 ASSAY OF LIPASE: CPT

## 2024-04-23 PROCEDURE — 74177 CT ABD & PELVIS W/CONTRAST: CPT

## 2024-04-23 PROCEDURE — 2580000003 HC RX 258: Performed by: SURGERY

## 2024-04-23 PROCEDURE — 81001 URINALYSIS AUTO W/SCOPE: CPT

## 2024-04-23 PROCEDURE — 99285 EMERGENCY DEPT VISIT HI MDM: CPT

## 2024-04-23 PROCEDURE — 93005 ELECTROCARDIOGRAM TRACING: CPT

## 2024-04-23 PROCEDURE — 2500000003 HC RX 250 WO HCPCS: Performed by: SURGERY

## 2024-04-23 PROCEDURE — 96374 THER/PROPH/DIAG INJ IV PUSH: CPT

## 2024-04-23 PROCEDURE — 99222 1ST HOSP IP/OBS MODERATE 55: CPT | Performed by: SURGERY

## 2024-04-23 PROCEDURE — 80053 COMPREHEN METABOLIC PANEL: CPT

## 2024-04-23 PROCEDURE — 36415 COLL VENOUS BLD VENIPUNCTURE: CPT

## 2024-04-23 PROCEDURE — 1100000003 HC PRIVATE W/ TELEMETRY

## 2024-04-23 PROCEDURE — 93005 ELECTROCARDIOGRAM TRACING: CPT | Performed by: SURGERY

## 2024-04-23 RX ORDER — SODIUM CHLORIDE 9 MG/ML
INJECTION, SOLUTION INTRAVENOUS PRN
Status: DISCONTINUED | OUTPATIENT
Start: 2024-04-23 | End: 2024-04-25 | Stop reason: HOSPADM

## 2024-04-23 RX ORDER — LORAZEPAM 2 MG/ML
0.5 INJECTION INTRAMUSCULAR ONCE
Status: COMPLETED | OUTPATIENT
Start: 2024-04-23 | End: 2024-04-23

## 2024-04-23 RX ORDER — ENOXAPARIN SODIUM 100 MG/ML
40 INJECTION SUBCUTANEOUS DAILY
Status: DISCONTINUED | OUTPATIENT
Start: 2024-04-23 | End: 2024-04-25 | Stop reason: HOSPADM

## 2024-04-23 RX ORDER — SODIUM CHLORIDE 0.9 % (FLUSH) 0.9 %
5-40 SYRINGE (ML) INJECTION PRN
Status: DISCONTINUED | OUTPATIENT
Start: 2024-04-23 | End: 2024-04-25 | Stop reason: HOSPADM

## 2024-04-23 RX ORDER — POTASSIUM CHLORIDE 7.45 MG/ML
10 INJECTION INTRAVENOUS PRN
Status: DISCONTINUED | OUTPATIENT
Start: 2024-04-23 | End: 2024-04-25 | Stop reason: HOSPADM

## 2024-04-23 RX ORDER — ASCORBIC ACID 500 MG
500 TABLET ORAL DAILY
COMMUNITY

## 2024-04-23 RX ORDER — DAPAGLIFLOZIN 10 MG/1
10 TABLET, FILM COATED ORAL EVERY MORNING
COMMUNITY

## 2024-04-23 RX ORDER — METOPROLOL TARTRATE 1 MG/ML
5 INJECTION, SOLUTION INTRAVENOUS EVERY 6 HOURS
Status: DISCONTINUED | OUTPATIENT
Start: 2024-04-23 | End: 2024-04-24

## 2024-04-23 RX ORDER — POTASSIUM CHLORIDE 20 MEQ/1
40 TABLET, EXTENDED RELEASE ORAL PRN
Status: DISCONTINUED | OUTPATIENT
Start: 2024-04-23 | End: 2024-04-25 | Stop reason: HOSPADM

## 2024-04-23 RX ORDER — MAGNESIUM SULFATE IN WATER 40 MG/ML
2000 INJECTION, SOLUTION INTRAVENOUS PRN
Status: DISCONTINUED | OUTPATIENT
Start: 2024-04-23 | End: 2024-04-25 | Stop reason: HOSPADM

## 2024-04-23 RX ORDER — ZINC GLUCONATE 50 MG
50 TABLET ORAL DAILY
COMMUNITY

## 2024-04-23 RX ORDER — ONDANSETRON 2 MG/ML
4 INJECTION INTRAMUSCULAR; INTRAVENOUS EVERY 6 HOURS PRN
Status: DISCONTINUED | OUTPATIENT
Start: 2024-04-23 | End: 2024-04-25 | Stop reason: HOSPADM

## 2024-04-23 RX ORDER — MORPHINE SULFATE 2 MG/ML
2 INJECTION, SOLUTION INTRAMUSCULAR; INTRAVENOUS EVERY 4 HOURS PRN
Status: DISCONTINUED | OUTPATIENT
Start: 2024-04-23 | End: 2024-04-25 | Stop reason: HOSPADM

## 2024-04-23 RX ORDER — SODIUM CHLORIDE 0.9 % (FLUSH) 0.9 %
5-40 SYRINGE (ML) INJECTION EVERY 12 HOURS SCHEDULED
Status: DISCONTINUED | OUTPATIENT
Start: 2024-04-23 | End: 2024-04-25 | Stop reason: HOSPADM

## 2024-04-23 RX ORDER — HYDROMORPHONE HYDROCHLORIDE 1 MG/ML
0.25 INJECTION, SOLUTION INTRAMUSCULAR; INTRAVENOUS; SUBCUTANEOUS
Status: DISCONTINUED | OUTPATIENT
Start: 2024-04-23 | End: 2024-04-25 | Stop reason: HOSPADM

## 2024-04-23 RX ORDER — DEXTROSE MONOHYDRATE, SODIUM CHLORIDE, AND POTASSIUM CHLORIDE 50; 1.49; 4.5 G/1000ML; G/1000ML; G/1000ML
INJECTION, SOLUTION INTRAVENOUS CONTINUOUS
Status: DISCONTINUED | OUTPATIENT
Start: 2024-04-23 | End: 2024-04-25 | Stop reason: HOSPADM

## 2024-04-23 RX ORDER — HYDROMORPHONE HYDROCHLORIDE 1 MG/ML
0.5 INJECTION, SOLUTION INTRAMUSCULAR; INTRAVENOUS; SUBCUTANEOUS
Status: DISCONTINUED | OUTPATIENT
Start: 2024-04-23 | End: 2024-04-25 | Stop reason: HOSPADM

## 2024-04-23 RX ORDER — HYDROMORPHONE HYDROCHLORIDE 1 MG/ML
0.5 INJECTION, SOLUTION INTRAMUSCULAR; INTRAVENOUS; SUBCUTANEOUS ONCE
Status: COMPLETED | OUTPATIENT
Start: 2024-04-23 | End: 2024-04-23

## 2024-04-23 RX ORDER — PROCHLORPERAZINE EDISYLATE 5 MG/ML
10 INJECTION INTRAMUSCULAR; INTRAVENOUS EVERY 6 HOURS PRN
Status: DISCONTINUED | OUTPATIENT
Start: 2024-04-23 | End: 2024-04-25 | Stop reason: HOSPADM

## 2024-04-23 RX ORDER — CALCIUM CARBONATE 500(1250)
500 TABLET ORAL 2 TIMES DAILY
COMMUNITY

## 2024-04-23 RX ADMIN — IOPAMIDOL 100 ML: 755 INJECTION, SOLUTION INTRAVENOUS at 16:32

## 2024-04-23 RX ADMIN — Medication 1 LOZENGE: at 22:46

## 2024-04-23 RX ADMIN — TOPICAL ANESTHETIC: 200 SPRAY DENTAL; PERIODONTAL at 17:45

## 2024-04-23 RX ADMIN — HYDROMORPHONE HYDROCHLORIDE 0.25 MG: 1 INJECTION, SOLUTION INTRAMUSCULAR; INTRAVENOUS; SUBCUTANEOUS at 22:20

## 2024-04-23 RX ADMIN — PHENOL 1 SPRAY: 1.5 LIQUID ORAL at 22:45

## 2024-04-23 RX ADMIN — LORAZEPAM 0.5 MG: 2 INJECTION INTRAMUSCULAR; INTRAVENOUS at 17:42

## 2024-04-23 RX ADMIN — ENOXAPARIN SODIUM 40 MG: 100 INJECTION SUBCUTANEOUS at 20:41

## 2024-04-23 RX ADMIN — SODIUM CHLORIDE, PRESERVATIVE FREE 10 ML: 5 INJECTION INTRAVENOUS at 20:45

## 2024-04-23 RX ADMIN — POTASSIUM CHLORIDE, DEXTROSE MONOHYDRATE AND SODIUM CHLORIDE: 150; 5; 450 INJECTION, SOLUTION INTRAVENOUS at 21:09

## 2024-04-23 RX ADMIN — METOPROLOL TARTRATE 5 MG: 5 INJECTION INTRAVENOUS at 20:43

## 2024-04-23 RX ADMIN — HYDROMORPHONE HYDROCHLORIDE 0.5 MG: 1 INJECTION, SOLUTION INTRAMUSCULAR; INTRAVENOUS; SUBCUTANEOUS at 17:44

## 2024-04-23 RX ADMIN — ONDANSETRON 4 MG: 2 INJECTION INTRAMUSCULAR; INTRAVENOUS at 17:54

## 2024-04-23 RX ADMIN — ONDANSETRON 4 MG: 2 INJECTION INTRAMUSCULAR; INTRAVENOUS at 16:02

## 2024-04-23 ASSESSMENT — PAIN DESCRIPTION - ORIENTATION
ORIENTATION: LEFT
ORIENTATION: RIGHT;ANTERIOR

## 2024-04-23 ASSESSMENT — ENCOUNTER SYMPTOMS
DIARRHEA: 1
EYES NEGATIVE: 1
SHORTNESS OF BREATH: 0
VOMITING: 1
COUGH: 0
ABDOMINAL PAIN: 1
BACK PAIN: 0
SORE THROAT: 0
RESPIRATORY NEGATIVE: 1
NAUSEA: 1
ABDOMINAL DISTENTION: 0
CONSTIPATION: 1
DIARRHEA: 0
ABDOMINAL DISTENTION: 1
ALLERGIC/IMMUNOLOGIC NEGATIVE: 1

## 2024-04-23 ASSESSMENT — PAIN DESCRIPTION - LOCATION
LOCATION: THROAT
LOCATION: ABDOMEN

## 2024-04-23 ASSESSMENT — PAIN SCALES - GENERAL
PAINLEVEL_OUTOF10: 7
PAINLEVEL_OUTOF10: 4
PAINLEVEL_OUTOF10: 5
PAINLEVEL_OUTOF10: 3
PAINLEVEL_OUTOF10: 4

## 2024-04-23 ASSESSMENT — PAIN - FUNCTIONAL ASSESSMENT: PAIN_FUNCTIONAL_ASSESSMENT: ACTIVITIES ARE NOT PREVENTED

## 2024-04-23 ASSESSMENT — PAIN DESCRIPTION - DESCRIPTORS
DESCRIPTORS: DISCOMFORT;BURNING
DESCRIPTORS: ACHING

## 2024-04-24 ENCOUNTER — APPOINTMENT (OUTPATIENT)
Facility: HOSPITAL | Age: 74
End: 2024-04-24
Payer: MEDICARE

## 2024-04-24 LAB
ANION GAP SERPL CALC-SCNC: 6 MMOL/L (ref 5–15)
BASOPHILS # BLD: 0 K/UL (ref 0–0.1)
BASOPHILS NFR BLD: 0 % (ref 0–1)
BUN SERPL-MCNC: 31 MG/DL (ref 6–20)
BUN/CREAT SERPL: 26 (ref 12–20)
CALCIUM SERPL-MCNC: 9.1 MG/DL (ref 8.5–10.1)
CHLORIDE SERPL-SCNC: 109 MMOL/L (ref 97–108)
CO2 SERPL-SCNC: 23 MMOL/L (ref 21–32)
CREAT SERPL-MCNC: 1.19 MG/DL (ref 0.55–1.02)
DIFFERENTIAL METHOD BLD: ABNORMAL
EKG ATRIAL RATE: 103 BPM
EKG DIAGNOSIS: NORMAL
EKG P-R INTERVAL: 224 MS
EKG Q-T INTERVAL: 384 MS
EKG QRS DURATION: 136 MS
EKG QTC CALCULATION (BAZETT): 503 MS
EKG R AXIS: 243 DEGREES
EKG T AXIS: 56 DEGREES
EKG VENTRICULAR RATE: 103 BPM
EOSINOPHIL # BLD: 0 K/UL (ref 0–0.4)
EOSINOPHIL NFR BLD: 0 % (ref 0–7)
ERYTHROCYTE [DISTWIDTH] IN BLOOD BY AUTOMATED COUNT: 14.9 % (ref 11.5–14.5)
GLUCOSE BLD STRIP.AUTO-MCNC: 123 MG/DL (ref 65–117)
GLUCOSE BLD STRIP.AUTO-MCNC: 127 MG/DL (ref 65–117)
GLUCOSE BLD STRIP.AUTO-MCNC: 136 MG/DL (ref 65–117)
GLUCOSE BLD STRIP.AUTO-MCNC: 136 MG/DL (ref 65–117)
GLUCOSE SERPL-MCNC: 171 MG/DL (ref 65–100)
HCT VFR BLD AUTO: 41.2 % (ref 35–47)
HGB BLD-MCNC: 13 G/DL (ref 11.5–16)
IMM GRANULOCYTES # BLD AUTO: 0 K/UL (ref 0–0.04)
IMM GRANULOCYTES NFR BLD AUTO: 0 % (ref 0–0.5)
LYMPHOCYTES # BLD: 1.4 K/UL (ref 0.8–3.5)
LYMPHOCYTES NFR BLD: 12 % (ref 12–49)
MCH RBC QN AUTO: 26.3 PG (ref 26–34)
MCHC RBC AUTO-ENTMCNC: 31.6 G/DL (ref 30–36.5)
MCV RBC AUTO: 83.4 FL (ref 80–99)
MONOCYTES # BLD: 0.9 K/UL (ref 0–1)
MONOCYTES NFR BLD: 8 % (ref 5–13)
NEUTS SEG # BLD: 9.1 K/UL (ref 1.8–8)
NEUTS SEG NFR BLD: 80 % (ref 32–75)
NRBC # BLD: 0 K/UL (ref 0–0.01)
NRBC BLD-RTO: 0 PER 100 WBC
PLATELET # BLD AUTO: 293 K/UL (ref 150–400)
PMV BLD AUTO: 8.9 FL (ref 8.9–12.9)
POTASSIUM SERPL-SCNC: 4.4 MMOL/L (ref 3.5–5.1)
RBC # BLD AUTO: 4.94 M/UL (ref 3.8–5.2)
SERVICE CMNT-IMP: ABNORMAL
SODIUM SERPL-SCNC: 138 MMOL/L (ref 136–145)
WBC # BLD AUTO: 11.5 K/UL (ref 3.6–11)

## 2024-04-24 PROCEDURE — 1100000003 HC PRIVATE W/ TELEMETRY

## 2024-04-24 PROCEDURE — 71045 X-RAY EXAM CHEST 1 VIEW: CPT

## 2024-04-24 PROCEDURE — 80048 BASIC METABOLIC PNL TOTAL CA: CPT

## 2024-04-24 PROCEDURE — 74019 RADEX ABDOMEN 2 VIEWS: CPT

## 2024-04-24 PROCEDURE — 2580000003 HC RX 258: Performed by: SURGERY

## 2024-04-24 PROCEDURE — 2500000003 HC RX 250 WO HCPCS: Performed by: NURSE PRACTITIONER

## 2024-04-24 PROCEDURE — 99231 SBSQ HOSP IP/OBS SF/LOW 25: CPT | Performed by: SURGERY

## 2024-04-24 PROCEDURE — 82962 GLUCOSE BLOOD TEST: CPT

## 2024-04-24 PROCEDURE — 6360000004 HC RX CONTRAST MEDICATION: Performed by: NURSE PRACTITIONER

## 2024-04-24 PROCEDURE — 6360000002 HC RX W HCPCS: Performed by: SURGERY

## 2024-04-24 PROCEDURE — 36415 COLL VENOUS BLD VENIPUNCTURE: CPT

## 2024-04-24 PROCEDURE — 85025 COMPLETE CBC W/AUTO DIFF WBC: CPT

## 2024-04-24 PROCEDURE — 2500000003 HC RX 250 WO HCPCS: Performed by: SURGERY

## 2024-04-24 RX ORDER — METOPROLOL TARTRATE 1 MG/ML
2.5 INJECTION, SOLUTION INTRAVENOUS EVERY 6 HOURS
Status: DISCONTINUED | OUTPATIENT
Start: 2024-04-24 | End: 2024-04-25 | Stop reason: HOSPADM

## 2024-04-24 RX ORDER — INSULIN LISPRO 100 [IU]/ML
0-4 INJECTION, SOLUTION INTRAVENOUS; SUBCUTANEOUS EVERY 6 HOURS
Status: DISCONTINUED | OUTPATIENT
Start: 2024-04-24 | End: 2024-04-25 | Stop reason: HOSPADM

## 2024-04-24 RX ORDER — LABETALOL HYDROCHLORIDE 5 MG/ML
10 INJECTION INTRAVENOUS EVERY 6 HOURS PRN
Status: DISCONTINUED | OUTPATIENT
Start: 2024-04-24 | End: 2024-04-25 | Stop reason: HOSPADM

## 2024-04-24 RX ORDER — INSULIN LISPRO 100 [IU]/ML
0-4 INJECTION, SOLUTION INTRAVENOUS; SUBCUTANEOUS NIGHTLY
Status: DISCONTINUED | OUTPATIENT
Start: 2024-04-24 | End: 2024-04-25 | Stop reason: HOSPADM

## 2024-04-24 RX ORDER — GLUCAGON 1 MG/ML
1 KIT INJECTION PRN
Status: DISCONTINUED | OUTPATIENT
Start: 2024-04-24 | End: 2024-04-25 | Stop reason: HOSPADM

## 2024-04-24 RX ORDER — DEXTROSE MONOHYDRATE 100 MG/ML
INJECTION, SOLUTION INTRAVENOUS CONTINUOUS PRN
Status: DISCONTINUED | OUTPATIENT
Start: 2024-04-24 | End: 2024-04-25 | Stop reason: HOSPADM

## 2024-04-24 RX ORDER — ROSUVASTATIN CALCIUM 20 MG/1
20 TABLET, COATED ORAL NIGHTLY
Status: DISCONTINUED | OUTPATIENT
Start: 2024-04-24 | End: 2024-04-25 | Stop reason: HOSPADM

## 2024-04-24 RX ORDER — ASPIRIN 81 MG/1
81 TABLET, CHEWABLE ORAL DAILY
Status: DISCONTINUED | OUTPATIENT
Start: 2024-04-24 | End: 2024-04-25 | Stop reason: HOSPADM

## 2024-04-24 RX ORDER — INSULIN LISPRO 100 [IU]/ML
0-4 INJECTION, SOLUTION INTRAVENOUS; SUBCUTANEOUS EVERY 6 HOURS
Status: DISCONTINUED | OUTPATIENT
Start: 2024-04-24 | End: 2024-04-24

## 2024-04-24 RX ORDER — LIDOCAINE 4 G/G
1 PATCH TOPICAL DAILY PRN
Status: DISCONTINUED | OUTPATIENT
Start: 2024-04-24 | End: 2024-04-25 | Stop reason: HOSPADM

## 2024-04-24 RX ORDER — CARVEDILOL 6.25 MG/1
6.25 TABLET ORAL 2 TIMES DAILY WITH MEALS
Status: DISCONTINUED | OUTPATIENT
Start: 2024-04-24 | End: 2024-04-25 | Stop reason: HOSPADM

## 2024-04-24 RX ADMIN — ENOXAPARIN SODIUM 40 MG: 100 INJECTION SUBCUTANEOUS at 09:31

## 2024-04-24 RX ADMIN — HYDROMORPHONE HYDROCHLORIDE 0.25 MG: 1 INJECTION, SOLUTION INTRAMUSCULAR; INTRAVENOUS; SUBCUTANEOUS at 14:14

## 2024-04-24 RX ADMIN — POTASSIUM CHLORIDE, DEXTROSE MONOHYDRATE AND SODIUM CHLORIDE: 150; 5; 450 INJECTION, SOLUTION INTRAVENOUS at 05:05

## 2024-04-24 RX ADMIN — PROCHLORPERAZINE EDISYLATE 10 MG: 5 INJECTION INTRAMUSCULAR; INTRAVENOUS at 11:16

## 2024-04-24 RX ADMIN — DIATRIZOATE MEGLUMINE AND DIATRIZOATE SODIUM 80 ML: 660; 100 LIQUID ORAL; RECTAL at 09:17

## 2024-04-24 RX ADMIN — METOPROLOL TARTRATE 5 MG: 5 INJECTION INTRAVENOUS at 01:59

## 2024-04-24 RX ADMIN — METOPROLOL TARTRATE 2.5 MG: 5 INJECTION INTRAVENOUS at 09:26

## 2024-04-24 RX ADMIN — METOPROLOL TARTRATE 2.5 MG: 5 INJECTION INTRAVENOUS at 21:20

## 2024-04-24 RX ADMIN — METOPROLOL TARTRATE 2.5 MG: 5 INJECTION INTRAVENOUS at 14:20

## 2024-04-24 RX ADMIN — SODIUM CHLORIDE, PRESERVATIVE FREE 10 ML: 5 INJECTION INTRAVENOUS at 09:38

## 2024-04-24 RX ADMIN — POTASSIUM CHLORIDE, DEXTROSE MONOHYDRATE AND SODIUM CHLORIDE: 150; 5; 450 INJECTION, SOLUTION INTRAVENOUS at 12:36

## 2024-04-24 RX ADMIN — POTASSIUM CHLORIDE, DEXTROSE MONOHYDRATE AND SODIUM CHLORIDE: 150; 5; 450 INJECTION, SOLUTION INTRAVENOUS at 21:28

## 2024-04-24 ASSESSMENT — PAIN DESCRIPTION - LOCATION: LOCATION: ABDOMEN

## 2024-04-24 ASSESSMENT — PAIN SCALES - GENERAL
PAINLEVEL_OUTOF10: 7
PAINLEVEL_OUTOF10: 0

## 2024-04-24 ASSESSMENT — PAIN DESCRIPTION - DESCRIPTORS: DESCRIPTORS: THROBBING

## 2024-04-24 ASSESSMENT — PAIN DESCRIPTION - ORIENTATION: ORIENTATION: MID

## 2024-04-24 NOTE — H&P
HISTORY AND PHYSICAL             Date: 4/23/2024        Patient Name: Karlie Barrios     YOB: 1950      Age:  73 y.o.    Chief Complaint     Chief Complaint   Patient presents with    Emesis     Patient wheeled into triage with chief complaint of vomiting since 0300 this morning with diarrhea. Hx pacemaker left chest. Pt has not been able to keep down any food or drink. Denies CP/SOB, no fever.           History Obtained From   patient    History of Present Illness   72 yo woman well known to [facility] following Inna's procedure in March 2010 by Dr Brown for colonoscopic sigmoid colon perforation.  She had a colostomy closure in July of 2010 and has done well since.  Last night she had a hamburger at a restaurant and woke up at 3 am with protracted nausea, vomiting and abdominal pain.  She had a BM at that time and nothing NH since.  She continues to have diffuse abdominal pain without localization.  CT shows dilated jejunum to 3.1 cm with transition in the pelvis.  Pt also has h/o abdominal hysterectomy many years ago.    PMH includes HTN, IBS, pacer dependent heart rhythm  PSH as above plus lap nnamdi, appendectomy,     Past Medical History     Past Medical History:   Diagnosis Date    Arthritis     hands,  left knee    Colon polyps     Hypertension     IBS (irritable bowel syndrome)     colostomy  revresed    Nausea & vomiting     anesthesia related    Other ill-defined conditions(799.89)     seasonal allergies        Past Surgical History     Past Surgical History:   Procedure Laterality Date    APPENDECTOMY      CHOLECYSTECTOMY  09/06/13    Laparoscopic Cholecystectomty @ [facility]    COLONOSCOPY N/A 9/8/2020    COLONOSCOPY performed by Srikanth Moore MD at [facility] ENDOSCOPY    DILATION AND CURETTAGE OF UTERUS      HYSTERECTOMY (CERVIX STATUS UNKNOWN)      RSO    MOHS SURGERY      right    ORTHOPEDIC SURGERY  [id number]    \"removal right kneecap\"--\"infection\"    ORTHOPEDIC SURGERY      right knee bursa     and dry.      Findings: No bruising, erythema or rash.   Neurological:      Mental Status: She is alert and oriented to person, place, and time.   Psychiatric:         Mood and Affect: Mood normal.         Thought Content: Thought content normal.         Labs      Recent Results (from the past 24 hour(s))   CBC with Diff    Collection Time: 04/23/24  1:50 PM   Result Value Ref Range    WBC 15.4 (H) 3.6 - 11.0 K/uL    RBC 5.51 (H) 3.80 - 5.20 M/uL    Hemoglobin 14.6 11.5 - 16.0 g/dL    Hematocrit 45.8 35.0 - 47.0 %    MCV 83.1 80.0 - 99.0 FL    MCH 26.5 26.0 - 34.0 PG    MCHC 31.9 30.0 - 36.5 g/dL    RDW 14.8 (H) 11.5 - 14.5 %    Platelets 346 150 - 400 K/uL    MPV 8.7 (L) 8.9 - 12.9 FL    Nucleated RBCs 0.0 0  WBC    nRBC 0.00 0.00 - 0.01 K/uL    Neutrophils % 87 (H) 32 - 75 %    Lymphocytes % 8 (L) 12 - 49 %    Monocytes % 4 (L) 5 - 13 %    Eosinophils % 0 0 - 7 %    Basophils % 0 0 - 1 %    Immature Granulocytes % 1 (H) 0.0 - 0.5 %    Neutrophils Absolute 13.4 (H) 1.8 - 8.0 K/UL    Lymphocytes Absolute 1.2 0.8 - 3.5 K/UL    Monocytes Absolute 0.7 0.0 - 1.0 K/UL    Eosinophils Absolute 0.0 0.0 - 0.4 K/UL    Basophils Absolute 0.1 0.0 - 0.1 K/UL    Immature Granulocytes Absolute 0.1 (H) 0.00 - 0.04 K/UL    Differential Type AUTOMATED     CMP    Collection Time: 04/23/24  1:50 PM   Result Value Ref Range    Sodium 137 136 - 145 mmol/L    Potassium 4.3 3.5 - 5.1 mmol/L    Chloride 104 97 - 108 mmol/L    CO2 27 21 - 32 mmol/L    Anion Gap 6 5 - 15 mmol/L    Glucose 181 (H) 65 - 100 mg/dL    BUN 31 (H) 6 - 20 MG/DL    Creatinine 1.23 (H) 0.55 - 1.02 MG/DL    Bun/Cre Ratio 25 (H) 12 - 20      Est, Glom Filt Rate 46 (L) >60 ml/min/1.73m2    Calcium 10.5 (H) 8.5 - 10.1 MG/DL    Total Bilirubin 0.8 0.2 - 1.0 MG/DL    ALT 24 12 - 78 U/L    AST 22 15 - 37 U/L    Alk Phosphatase 111 45 - 117 U/L    Total Protein 8.9 (H) 6.4 - 8.2 g/dL    Albumin 4.9 3.5 - 5.0 g/dL    Globulin 4.0 2.0 - 4.0 g/dL    Albumin/Globulin Ratio

## 2024-04-24 NOTE — CONSULTS
[facility]              [address] [id number]                              CONSULTATION      PATIENT NAME: YURIY TANG               : 1950  MED REC NO: [id number]                       ROOM: 3127  ACCOUNT NO: [id number]                       ADMIT DATE: 2024  PROVIDER: Mauricio Hinojosa MD    DATE OF SERVICE:  2024    ATTENDING PHYSICIAN:  Eddie Parikh MD    REASON FOR CONSULTATION:  Preop evaluation prior to small bowel obstruction surgery.    CHIEF COMPLAINT:  Nausea and vomiting.    HISTORY OF PRESENT ILLNESS:  The patient is a 73-year-old female with a history of coronary artery disease with prior stenting of the LAD in .  At that time, she had a non-ST-elevation MI.  She was also diagnosed with cardiomyopathy at that time and had an EF as low as 20%.  She underwent biventricular pacemaker placement by Dr. Fairchild in  and her most recent EF in 2022 was normal.  The patient has had no recent cardiac issues.  She is followed by Dr. Seth Gonzales and last saw him in .  She is able to walk up 2 flights of stairs without difficulty and denies chest pain or shortness of breath.  No palpitations.  No other complaints.  Blood pressures apparently have been controlled.  She was admitted after developing nausea and vomiting and was found to have a small bowel obstruction based on CT.  Surgery is being considered.  She is currently comfortable and receiving IV metoprolol.  Prior to admission, she was on carvedilol as well as aspirin and a statin.    PAST MEDICAL HISTORY:  As noted above, mild chronic kidney disease, DJD, irritable bowel syndrome.    PAST SURGICAL HISTORY:  Status post Inna's procedure, status post hysterectomy, status post appendectomy, status post cholecystectomy, status post tonsillectomy.    CURRENT MEDICATIONS:  Lovenox subcu, IV Dilaudid, Humalog sliding scale, metoprolol IV, IV D5 half-normal saline  with potassium chloride.    SOCIAL HISTORY:  The patient lives locally.  She does not smoke or drink.    FAMILY HISTORY:  Both parents had heart disease.    REVIEW OF SYSTEMS:  As noted above, otherwise noncontributory.    PHYSICAL EXAMINATION:  GENERAL:  Reveals an elderly white female, in no acute distress.  VITAL SIGNS:  Blood pressure 153/74, pulse 88, respirations 15-18.  HEENT:  Pupils are equal and reactive.  There is an NG tube present in the patient's nares.  Oropharynx unremarkable.  NECK:  Supple.  No masses or thyromegaly.  No cervical or supraclavicular adenopathy.  No carotid bruits.  No JVD.  CHEST:  Clear anteriorly.  SKIN:  Warm and dry.  CARDIAC:  Regular rate and rhythm.  Normal S1, S2.  No obvious murmurs, rubs, gallops, or clicks.  ABDOMEN:  Soft, nontender.  No obvious masses or organomegaly.  Bowel sounds positive.  EXTREMITIES:  No cyanosis, clubbing, or edema.  Distal pulses 1 to 2+ in the feet bilaterally.  NEURO:  No obvious gross motor deficits.    LABORATORY DATA:  BUN 31, creatinine 1.2.  Hemoglobin 13.    Chest x-ray negative.  EKG shows sinus rhythm with first-degree AV block, 100% biventricular pacing, no obvious pacemaker malfunction.    IMPRESSION:    1. Preoperative evaluation prior to possible surgery for small bowel obstruction.  2. History of coronary artery disease with remote stenting of the LAD, stable without obvious angina.  3. History of cardiomyopathy with prior EF 20%, most recent EF normal, and no evidence of heart failure on exam.  4. Hypertension.  5. Dyslipidemia.  6. Mild chronic kidney disease.  7. Prior Inna's procedure.    RECOMMENDATIONS:  The patient appears stable from a cardiac standpoint with no recent cardiac events or symptoms to suggest angina or heart failure.  Okay to proceed with surgery at acceptable cardiac risk.  Continue IV metoprolol perioperatively and until the patient is able to take p.o.  can resume her prior oral cardiac medications at

## 2024-04-24 NOTE — CONSULTS
Hospitalist Consultation Note    NAME:  Karlie Barrios   :   1950   MRN:   [id number]     ATTENDING: Eddie Parikh MD  PCP:  Jose Alejandro Jamison MD    Date/Time:  2024 2:09 AM      Recommendations/Plan:       ASSESSMENT:    Hospitalist group asked to consult by Dr Parikh surgery, for Med mgmt consult while pt is npo with NGT for sbo per surgery request to kindly perform consult overnight    RECOMMENDATIONS:    1) chronic condition medication mgmt review for PMH section dx as below-, po meds placed on hold, consider resume once able to take po. Continue telemetry monitoring per already ordered.  PMH:  CAD-  off ATC, hold baby asa while npo, consider resuming as appropriate  HTN- home po meds placed on hold, consider resuming when taking po and as appropriate- lopressor 2.5mg iv q6h while npo, labetalol 10mg iv prn w/parameters ordered while npo- bp presently controlled  HLD- hold while npo- pt aware being held, consider resuming as appropriate  IBS- not presently on meds, would consider bowel regimen should pt require vs probiotics when able to take po  HFrEF- has PPM states 2/2 h/o HF- 22 EF 20%- as above holding antihtn while npo, consider resuming as appropriate.. on exam no evidence of FVO and not on chronic diuretics at home, not chf exacerbation appearing on exam.  OA left knee- resume home lidocaine patch prn daily, presently no knee pain reported  2) acute mild hyperglycemia w/o setting of known DM, is npo- A1c- pending, insulin protocol while npo q6h, hypoglycemia protocol, accucheck q6h, ssi, no basal insulin for now  3) acute abd pain likely 2/2 sbo and NGT contributing to throat soreness- may continue Pain mgmt per surgery- pt reported pain is well controlled on present regimen.   4)  pre-op remaining work-up, presently being med mgmt by sx, no sx presently planned- however, given light of significant cards h/o of pt- cardiac consult pre-op clearance ordered- appreciate expertise.

## 2024-04-24 NOTE — PROGRESS NOTES
per surgery- lovenox     Subjective:     Chief Complaint / Reason for Physician Visit  Follow-up hypertension discussed with RN events overnight.   Heart rate controlled, blood pressure slightly elevated  No acute complaints    Objective:     VITALS:   Last 24hrs VS reviewed since prior progress note. Most recent are:  Patient Vitals for the past 24 hrs:   BP Temp Temp src Pulse Resp SpO2 Height Weight   04/24/24 1414 -- -- -- -- 18 -- -- --   04/24/24 1128 (!) 169/76 98.2 °F (36.8 °C) Oral 81 18 94 % -- --   04/24/24 0720 (!) 153/74 98.4 °F (36.9 °C) Oral 88 18 94 % -- --   04/24/24 0600 -- -- -- -- -- -- -- 62 kg (136 lb 11 oz)   04/24/24 0207 (!) 147/63 -- -- 75 -- -- -- --   04/24/24 0202 (!) 148/71 -- -- 79 -- -- -- --   04/24/24 0158 (!) 154/72 97.9 °F (36.6 °C) Oral 85 16 92 % -- --   04/23/24 2250 -- -- -- -- 16 -- -- --   04/23/24 2249 (!) 168/77 97.9 °F (36.6 °C) Oral 93 16 92 % -- --   04/23/24 2220 -- -- -- -- 16 -- -- --   04/23/24 2041 (!) 165/89 -- -- (!) 106 -- -- -- --   04/23/24 2008 (!) 157/88 98.6 °F (37 °C) Oral (!) 107 20 91 % -- --   04/23/24 1900 (!) 154/85 98.8 °F (37.1 °C) Oral (!) 104 20 95 % -- --   04/23/24 1830 -- -- -- (!) 105 19 94 % -- --   04/23/24 1815 (!) 183/88 -- -- (!) 103 23 95 % -- --   04/23/24 1800 (!) 173/111 -- -- (!) 101 24 96 % -- --   04/23/24 1747 -- -- -- -- -- -- 1.651 m (5' 5\") 63 kg (138 lb 14.2 oz)   04/23/24 1746 -- 99 °F (37.2 °C) Oral -- -- -- -- --   04/23/24 1745 (!) 164/86 -- -- (!) 104 20 96 % -- --   04/23/24 1730 (!) 163/103 -- -- (!) 105 18 97 % -- --   04/23/24 1715 (!) 155/80 -- -- (!) 102 20 97 % -- --   04/23/24 1700 (!) 145/77 -- -- 98 18 95 % -- --   04/23/24 1645 -- -- -- 96 16 97 % -- --   04/23/24 1619 (!) 147/84 -- -- 97 18 98 % -- --   04/23/24 1601 -- -- -- 97 19 99 % -- --   04/23/24 1600 (!) 101/90 -- -- -- -- 98 % -- --         Intake/Output Summary (Last 24 hours) at 4/24/2024 1430  Last data filed at 4/24/2024 1422  Gross per 24 hour

## 2024-04-24 NOTE — PROGRESS NOTES
Admit Date: 2024    Subjective:     Patient feeling much better today.  No flatus or BM yet.  500 cc NG o/p overnight, now thinner bilious o/p.     Objective:     Blood pressure (!) 153/74, pulse 88, temperature 98.4 °F (36.9 °C), temperature source Oral, resp. rate 18, height 1.651 m (5' 5\"), weight 62 kg (136 lb 11 oz), SpO2 94 %.    Temp (24hrs), Av.4 °F (36.9 °C), Min:97.9 °F (36.6 °C), Max:99 °F (37.2 °C)      Physical Exam:  GENERAL: alert, appears stated age, and cooperative, LUNG: clear to auscultation bilaterally, HEART: regular rate and rhythm, ABDOMEN: soft, ND, NT, EXTREMITIES:  extremities normal, atraumatic, no cyanosis or edema    Labs:   Recent Results (from the past 24 hour(s))   CBC with Diff    Collection Time: 24  1:50 PM   Result Value Ref Range    WBC 15.4 (H) 3.6 - 11.0 K/uL    RBC 5.51 (H) 3.80 - 5.20 M/uL    Hemoglobin 14.6 11.5 - 16.0 g/dL    Hematocrit 45.8 35.0 - 47.0 %    MCV 83.1 80.0 - 99.0 FL    MCH 26.5 26.0 - 34.0 PG    MCHC 31.9 30.0 - 36.5 g/dL    RDW 14.8 (H) 11.5 - 14.5 %    Platelets 346 150 - 400 K/uL    MPV 8.7 (L) 8.9 - 12.9 FL    Nucleated RBCs 0.0 0  WBC    nRBC 0.00 0.00 - 0.01 K/uL    Neutrophils % 87 (H) 32 - 75 %    Lymphocytes % 8 (L) 12 - 49 %    Monocytes % 4 (L) 5 - 13 %    Eosinophils % 0 0 - 7 %    Basophils % 0 0 - 1 %    Immature Granulocytes % 1 (H) 0.0 - 0.5 %    Neutrophils Absolute 13.4 (H) 1.8 - 8.0 K/UL    Lymphocytes Absolute 1.2 0.8 - 3.5 K/UL    Monocytes Absolute 0.7 0.0 - 1.0 K/UL    Eosinophils Absolute 0.0 0.0 - 0.4 K/UL    Basophils Absolute 0.1 0.0 - 0.1 K/UL    Immature Granulocytes Absolute 0.1 (H) 0.00 - 0.04 K/UL    Differential Type AUTOMATED     CMP    Collection Time: 24  1:50 PM   Result Value Ref Range    Sodium 137 136 - 145 mmol/L    Potassium 4.3 3.5 - 5.1 mmol/L    Chloride 104 97 - 108 mmol/L    CO2 27 21 - 32 mmol/L    Anion Gap 6 5 - 15 mmol/L    Glucose 181 (H) 65 - 100 mg/dL    BUN 31 (H) 6 - 20 MG/DL     Creatinine 1.23 (H) 0.55 - 1.02 MG/DL    Bun/Cre Ratio 25 (H) 12 - 20      Est, Glom Filt Rate 46 (L) >60 ml/min/1.73m2    Calcium 10.5 (H) 8.5 - 10.1 MG/DL    Total Bilirubin 0.8 0.2 - 1.0 MG/DL    ALT 24 12 - 78 U/L    AST 22 15 - 37 U/L    Alk Phosphatase 111 45 - 117 U/L    Total Protein 8.9 (H) 6.4 - 8.2 g/dL    Albumin 4.9 3.5 - 5.0 g/dL    Globulin 4.0 2.0 - 4.0 g/dL    Albumin/Globulin Ratio 1.2 1.1 - 2.2     Lipase    Collection Time: 04/23/24  1:50 PM   Result Value Ref Range    Lipase 67 13 - 75 U/L   Urinalysis    Collection Time: 04/23/24  5:00 PM   Result Value Ref Range    Color, UA YELLOW/STRAW      Appearance CLEAR CLEAR      Specific Gravity, UA 1.015 1.003 - 1.030      pH, Urine 5.5 5.0 - 8.0      Protein, UA 30 (A) NEG mg/dL    Glucose,  (A) NEG mg/dL    Ketones, Urine 15 (A) NEG mg/dL    Bilirubin Urine Negative NEG      Blood, Urine Negative NEG      Urobilinogen, Urine 1.0 0.2 - 1.0 EU/dL    Nitrite, Urine Negative NEG      Leukocyte Esterase, Urine Negative NEG      WBC, UA 0-4 0 - 4 /hpf    RBC, UA 0-5 0 - 5 /hpf    Epithelial Cells UA FEW FEW /lpf    BACTERIA, URINE Negative NEG /hpf    Mucus, UA TRACE (A) NEG /lpf    Hyaline Casts, UA 0-2 0 - 5 /lpf   EKG 12 Lead    Collection Time: 04/23/24  6:35 PM   Result Value Ref Range    Ventricular Rate 103 BPM    Atrial Rate 103 BPM    P-R Interval 224 ms    QRS Duration 136 ms    Q-T Interval 384 ms    QTc Calculation (Bazett) 503 ms    R Axis 243 degrees    T Axis 56 degrees    Diagnosis       ** Suspect arm lead reversal, interpretation assumes no reversal  Sinus tachycardia with 1st degree AV block  Right bundle branch block  Inferior infarct , age undetermined  Anterolateral infarct (cited on or before 23-APR-2024)   Confirmed by Mamta Phillip M.D. ([id number]) on 4/24/2024 8:32:13 AM     Basic Metabolic Panel w/ Reflex to MG    Collection Time: 04/24/24  2:26 AM   Result Value Ref Range    Sodium 138 136 - 145 mmol/L    Potassium

## 2024-04-24 NOTE — PROGRESS NOTES
End of Shift Note    Bedside shift change report given to TRENT Lozano (oncoming nurse) by Yuliana Kiser RN (offgoing nurse).  Report included the following information SBAR, Kardex, ED Summary, Procedure Summary, Intake/Output, MAR, Recent Results, Med Rec Status, and Cardiac Rhythm AV paced - sinus tachy to NSR    Shift worked:  1566-3236     Shift summary and any significant changes:     Pain managed w/ throat spray and 1x dose of 0.25 mg IV diluadid    Pt voiding - ambulating to bathroom, 1 unmeasured output, 200 mL measured    NGT output - greenish brown  575 mL    No flatus     Concerns for physician to address:  D/c PRN morphine order from ED?     Zone phone for oncoming shift:   [phone]           Yuliana Kiser RN

## 2024-04-24 NOTE — PROGRESS NOTES
End of Shift Note    Bedside shift change report given to TRENT Umanzor (oncoming nurse) by Josefa Linder RN (offgoing nurse).  Report included the following information SBAR, Procedure Summary, Intake/Output, and Recent Results    Shift worked:  7839-6845     Shift summary and any significant changes:     Pt did gastrografin challenge, Pt was given oral contrast 0917, suction was stopped until 13:30, and pt went to Xray at 1500 for serial rads.     Pt complained of some nausea and pain while NG tube not on suction. Given compazine inj  at 1116.  Given 0.25 mg Dilaudid at 1414.      850 mL total output from NG tube today.     Concerns for physician to address:      Zone phone for oncoming shift:   [phone]       Activity:     Number times ambulated in hallways past shift: 0  Number of times OOB to chair past shift: 5    Cardiac:   Cardiac Monitoring: Yes           Access:  Current line(s): PIV     Genitourinary:   Urinary status: voiding    Respiratory:      Chronic home O2 use?: NO  Incentive spirometer at bedside: NO       GI:     Current diet:  Diet NPO  Passing flatus: NO  Tolerating current diet: YES       Pain Management:   Patient states pain is manageable on current regimen: YES    Skin:     Interventions: increase time out of bed and limit briefs    Patient Safety:  Fall Score:    Interventions: gripper socks and pt to call before getting OOB       Length of Stay:  Expected LOS: 3  Actual LOS: 1      Josefa Linder RN

## 2024-04-24 NOTE — CONSULTS
Pt seen and examined    Full consult dictated    Stable cardiac wise for SBO surgery if needed.    Low cardiac risk    Resume ASA when able

## 2024-04-24 NOTE — CARE COORDINATION
Care Management Initial Assessment       RUR: calculating  Readmission? No  1st IM letter given? Yes -   1st  letter given: No     CM completed assessment w/pt at bedside.  No history of HH/IPR or DME use.  Patient is independent w/ADL to include driving.  Support system includes, , two son's, brother in-law & sister in-law.  Patient uses [facility].  No needs or concerns identified at this time.   will transport at d/c       04/24/24 0824   Service Assessment   Patient Orientation Alert and Oriented   Cognition Alert   History Provided By Patient   Primary Caregiver Self   Support Systems Spouse/Significant Other;Family Members  (two sons, brother in-law & sister in-law)   PCP Verified by CM Yes   Last Visit to PCP Within last 6 months   Prior Functional Level Independent in ADLs/IADLs   Current Functional Level Independent in ADLs/IADLs   Can patient return to prior living arrangement Yes   Family able to assist with home care needs: Yes   Would you like for me to discuss the discharge plan with any other family members/significant others, and if so, who? Yes  ()   Financial Resources Medicare   Community Resources None   Social/Functional History   Lives With Spouse   Type of Home House  (one story home w/2 MARIAN[address])   Home Equipment None   Active  Yes     Lana Yan  Ext [phone]

## 2024-04-24 NOTE — ED PROVIDER NOTES
EKG 12 lead    Date/Time: 4/24/2024 7:03 PM    Performed by: Julien Guzmán MD  Authorized by: Eddie Parikh MD    ECG interpreted by ED Physician in the absence of a cardiologist: yes    Interpretation:     Interpretation: non-specific    Rate:     ECG rate assessment: tachycardic    Rhythm:     Rhythm: sinus rhythm, sinus tachycardia and A-V block      A-V block: 1st Degree    Ectopy:     Ectopy: none    QRS:     QRS intervals:  Wide    QRS conduction: RBBB            Julien Guzmán MD  04/24/24 7189    
was due to a critical illness that impaired one or more vital organ systems, such that there was a high probability of imminent or life-threatening deterioration in the patient's condition. This care involved the highest level of preparedness to intervene urgently. This care involved high complexity decision making to assess, manipulate, and support vital system functions, to treat this degree of vital organ system failure, and to prevent further life threatening deterioration of the patient’s condition requiring frequent assessments and interventions.    Tamika Schafer PA-C      EMERGENCY DEPARTMENT COURSE and DIFFERENTIAL DIAGNOSIS/MDM   Vitals:    Vitals:    04/23/24 1600 04/23/24 1601 04/23/24 1619   BP: (!) 101/90  (!) 147/84   Pulse:  97 97   Resp:  19 18   SpO2: 98% 99% 98%        Patient was given the following medications:  Medications   ondansetron (ZOFRAN) injection 4 mg (4 mg IntraVENous Given 4/23/24 1602)   benzocaine (HURRICAINE) 20 % oral spray (has no administration in time range)   LORazepam (ATIVAN) injection 0.5 mg (has no administration in time range)   HYDROmorphone HCl PF (DILAUDID) injection 0.5 mg (has no administration in time range)   morphine (PF) injection 2 mg (has no administration in time range)   iopamidol (ISOVUE-370) 76 % injection 100 mL (100 mLs IntraVENous Given 4/23/24 1632)       CONSULTS: (Who and What was discussed)  IP CONSULT TO HOSPITALIST    Chronic Conditions:  has a past medical history of Arthritis, Colon polyps, Hypertension, IBS (irritable bowel syndrome), Nausea & vomiting, and Other ill-defined conditions(799.89).     Social Determinants affecting Dx or Tx: None    Records Reviewed (source and summary of external records): Nursing Notes, Old Medical Records, Previous Radiology Studies, and Previous Laboratory Studies    CC/HPI Summary, DDx, ED Course, and Reassessment: Patient is a 73-year-old female presents emergency room today for intractable nausea and vomiting

## 2024-04-25 ENCOUNTER — APPOINTMENT (OUTPATIENT)
Facility: HOSPITAL | Age: 74
End: 2024-04-25
Payer: MEDICARE

## 2024-04-25 VITALS
BODY MASS INDEX: 22.77 KG/M2 | SYSTOLIC BLOOD PRESSURE: 152 MMHG | WEIGHT: 136.69 LBS | OXYGEN SATURATION: 96 % | RESPIRATION RATE: 16 BRPM | HEIGHT: 65 IN | DIASTOLIC BLOOD PRESSURE: 80 MMHG | HEART RATE: 89 BPM | TEMPERATURE: 98.4 F

## 2024-04-25 LAB
ALBUMIN SERPL-MCNC: 3.8 G/DL (ref 3.5–5)
ALBUMIN/GLOB SERPL: 1.1 (ref 1.1–2.2)
ALP SERPL-CCNC: 79 U/L (ref 45–117)
ALT SERPL-CCNC: 16 U/L (ref 12–78)
ANION GAP SERPL CALC-SCNC: 4 MMOL/L (ref 5–15)
APTT PPP: 23.9 SEC (ref 22.1–31)
AST SERPL-CCNC: 18 U/L (ref 15–37)
BASOPHILS # BLD: 0 K/UL (ref 0–0.1)
BASOPHILS NFR BLD: 0 % (ref 0–1)
BILIRUB SERPL-MCNC: 0.6 MG/DL (ref 0.2–1)
BUN SERPL-MCNC: 31 MG/DL (ref 6–20)
BUN/CREAT SERPL: 32 (ref 12–20)
CALCIUM SERPL-MCNC: 9.1 MG/DL (ref 8.5–10.1)
CHLORIDE SERPL-SCNC: 107 MMOL/L (ref 97–108)
CO2 SERPL-SCNC: 26 MMOL/L (ref 21–32)
CREAT SERPL-MCNC: 0.98 MG/DL (ref 0.55–1.02)
DIFFERENTIAL METHOD BLD: ABNORMAL
EOSINOPHIL # BLD: 0.1 K/UL (ref 0–0.4)
EOSINOPHIL NFR BLD: 1 % (ref 0–7)
ERYTHROCYTE [DISTWIDTH] IN BLOOD BY AUTOMATED COUNT: 14.9 % (ref 11.5–14.5)
EST. AVERAGE GLUCOSE BLD GHB EST-MCNC: 120 MG/DL
GLOBULIN SER CALC-MCNC: 3.5 G/DL (ref 2–4)
GLUCOSE BLD STRIP.AUTO-MCNC: 109 MG/DL (ref 65–117)
GLUCOSE BLD STRIP.AUTO-MCNC: 127 MG/DL (ref 65–117)
GLUCOSE SERPL-MCNC: 145 MG/DL (ref 65–100)
HBA1C MFR BLD: 5.8 % (ref 4–5.6)
HCT VFR BLD AUTO: 40.2 % (ref 35–47)
HGB BLD-MCNC: 12.5 G/DL (ref 11.5–16)
IMM GRANULOCYTES # BLD AUTO: 0.1 K/UL (ref 0–0.04)
IMM GRANULOCYTES NFR BLD AUTO: 1 % (ref 0–0.5)
INR PPP: 1 (ref 0.9–1.1)
LYMPHOCYTES # BLD: 1.6 K/UL (ref 0.8–3.5)
LYMPHOCYTES NFR BLD: 15 % (ref 12–49)
MAGNESIUM SERPL-MCNC: 2.4 MG/DL (ref 1.6–2.4)
MCH RBC QN AUTO: 26.4 PG (ref 26–34)
MCHC RBC AUTO-ENTMCNC: 31.1 G/DL (ref 30–36.5)
MCV RBC AUTO: 84.8 FL (ref 80–99)
MONOCYTES # BLD: 1.1 K/UL (ref 0–1)
MONOCYTES NFR BLD: 10 % (ref 5–13)
NEUTS SEG # BLD: 7.5 K/UL (ref 1.8–8)
NEUTS SEG NFR BLD: 73 % (ref 32–75)
NRBC # BLD: 0 K/UL (ref 0–0.01)
NRBC BLD-RTO: 0 PER 100 WBC
PLATELET # BLD AUTO: 274 K/UL (ref 150–400)
PMV BLD AUTO: 9.1 FL (ref 8.9–12.9)
POTASSIUM SERPL-SCNC: 4.5 MMOL/L (ref 3.5–5.1)
PROT SERPL-MCNC: 7.3 G/DL (ref 6.4–8.2)
PROTHROMBIN TIME: 10.8 SEC (ref 9–11.1)
RBC # BLD AUTO: 4.74 M/UL (ref 3.8–5.2)
SERVICE CMNT-IMP: ABNORMAL
SERVICE CMNT-IMP: NORMAL
SODIUM SERPL-SCNC: 137 MMOL/L (ref 136–145)
THERAPEUTIC RANGE: NORMAL SECS (ref 58–77)
WBC # BLD AUTO: 10.2 K/UL (ref 3.6–11)

## 2024-04-25 PROCEDURE — 80053 COMPREHEN METABOLIC PANEL: CPT

## 2024-04-25 PROCEDURE — 99024 POSTOP FOLLOW-UP VISIT: CPT | Performed by: NURSE PRACTITIONER

## 2024-04-25 PROCEDURE — 83735 ASSAY OF MAGNESIUM: CPT

## 2024-04-25 PROCEDURE — 85025 COMPLETE CBC W/AUTO DIFF WBC: CPT

## 2024-04-25 PROCEDURE — 36415 COLL VENOUS BLD VENIPUNCTURE: CPT

## 2024-04-25 PROCEDURE — 85610 PROTHROMBIN TIME: CPT

## 2024-04-25 PROCEDURE — 82962 GLUCOSE BLOOD TEST: CPT

## 2024-04-25 PROCEDURE — 85730 THROMBOPLASTIN TIME PARTIAL: CPT

## 2024-04-25 PROCEDURE — 6360000002 HC RX W HCPCS: Performed by: SURGERY

## 2024-04-25 PROCEDURE — 83036 HEMOGLOBIN GLYCOSYLATED A1C: CPT

## 2024-04-25 PROCEDURE — 0D9670Z DRAINAGE OF STOMACH WITH DRAINAGE DEVICE, VIA NATURAL OR ARTIFICIAL OPENING: ICD-10-PCS | Performed by: SURGERY

## 2024-04-25 PROCEDURE — 2580000003 HC RX 258: Performed by: SURGERY

## 2024-04-25 PROCEDURE — 74019 RADEX ABDOMEN 2 VIEWS: CPT

## 2024-04-25 PROCEDURE — 2500000003 HC RX 250 WO HCPCS: Performed by: NURSE PRACTITIONER

## 2024-04-25 RX ADMIN — METOPROLOL TARTRATE 2.5 MG: 5 INJECTION INTRAVENOUS at 02:22

## 2024-04-25 RX ADMIN — SODIUM CHLORIDE, PRESERVATIVE FREE 10 ML: 5 INJECTION INTRAVENOUS at 08:56

## 2024-04-25 RX ADMIN — ENOXAPARIN SODIUM 40 MG: 100 INJECTION SUBCUTANEOUS at 08:56

## 2024-04-25 RX ADMIN — METOPROLOL TARTRATE 2.5 MG: 5 INJECTION INTRAVENOUS at 08:56

## 2024-04-25 ASSESSMENT — PAIN SCALES - GENERAL
PAINLEVEL_OUTOF10: 0

## 2024-04-25 NOTE — PROGRESS NOTES
Hospitalist Consult Followup Note    NAME:   Karlie Barrios   : 1950   MRN: [id number]     Date/Time: 2024 5:15 PM  Patient PCP: Jose Alejandro Jamison MD      Assessment / Plan:  chronic condition medication mgmt review     PMH:  CAD-  off ATC, resume baby asa   HTN- resume home po meds  HLD- resume home meds   IBS- not presently on meds  HFrEF- has PPM states 2/2 h/o HF- 22 EF 20%-   Resume home meds. on exam no evidence of FVO and not on chronic diuretics at home, not chf exacerbation appearing on exam.  OA left knee- resume home lidocaine patch prn daily, presently no knee pain reported  2) acute mild hyperglycemia w/o setting of known DM, is npo- A1c- pending, insulin protocol while npo q6h, hypoglycemia protocol, accucheck q6h, ssi, no basal insulin for now  3) acute abd pain likely 2/2 sbo and NGT contributing to throat soreness- may continue Pain mgmt per surgery- pt reported pain is well controlled on present regimen.   - Cardiology evaluated, is stable  - did not undergo surgery, symptoms improved    Ok for discharge from Hospitalist standpoint       Subjective:     Chief Complaint / Reason for Physician Visit  Follow-up hypertension discussed with RN events overnight.   Feels ok, having bowel movements.    Objective:     VITALS:   Last 24hrs VS reviewed since prior progress note. Most recent are:  Patient Vitals for the past 24 hrs:   BP Temp Temp src Pulse Resp SpO2   24 0845 (!) 152/80 98.4 °F (36.9 °C) Oral 89 16 96 %   24 0400 (!) 156/62 98.2 °F (36.8 °C) Oral 84 16 95 %   24 0220 (!) 160/71 98.1 °F (36.7 °C) Oral 89 16 --   24 2347 (!) 155/79 98.8 °F (37.1 °C) -- 86 16 95 %   24 2109 (!) 151/71 -- -- -- -- --   24 2106 (!) 164/72 99.1 °F (37.3 °C) -- 90 17 92 %           Intake/Output Summary (Last 24 hours) at 2024 1715  Last data filed at 2024 0745  Gross per 24 hour   Intake 1498.56 ml   Output 1950 ml   Net -451.44 ml          I  had a face to face encounter and independently examined this patient on 4/25/2024, as outlined below:  PHYSICAL EXAM:  General: Alert, cooperative  EENT:  EOMI. Anicteric sclerae.  Resp:  CTA bilaterally, no wheezing or rales.  No accessory muscle use  CV:  Regular  rate,  No edema  GI:  Soft, Non distended, Non tender.  +Bowel sounds  Neurologic:  Alert and oriented X 3, normal speech,   Skin:  No rashes.  No jaundice    Reviewed most current lab test results and cultures  YES  Reviewed most current radiology test results   YES  Review and summation of old records today    NO  Reviewed patient's current orders and MAR    YES  PMH/SH reviewed - no change compared to H&P    Procedures: see electronic medical records for all procedures/Xrays and details which were not copied into this note but were reviewed prior to creation of Plan.      LABS:  I reviewed today's most current labs and imaging studies.  Pertinent labs include:  Recent Labs     04/23/24  1350 04/24/24  0226 04/25/24  0348   WBC 15.4* 11.5* 10.2   HGB 14.6 13.0 12.5   HCT 45.8 41.2 40.2    293 274       Recent Labs     04/23/24  1350 04/24/24  0226 04/25/24  0348    138 137   K 4.3 4.4 4.5    109* 107   CO2 27 23 26   GLUCOSE 181* 171* 145*   BUN 31* 31* 31*   CREATININE 1.23* 1.19* 0.98   CALCIUM 10.5* 9.1 9.1   MG  --   --  2.4   BILITOT 0.8  --  0.6   AST 22  --  18   ALT 24  --  16   INR  --   --  1.0         Signed: David L Mendel, MD

## 2024-04-25 NOTE — PROGRESS NOTES
Cardiology Progress Note  4/25/2024     Admit Date: 4/23/2024  Admit Diagnosis: SBO (small bowel obstruction) ([facility]) [K56.609]  Partial intestinal obstruction, unspecified cause ([facility]) [K56.600]  CC: none currently  Cardiac Assessment/Plan (as noted by Dr Hinojosa):     IMPRESSION:    1. Preoperative evaluation prior to possible surgery for small bowel obstruction.  2. History of coronary artery disease with remote stenting of the LAD, stable without obvious angina.  3. History of cardiomyopathy with prior EF 20%, most recent EF normal, and no evidence of heart failure on exam.  4. Hypertension.  5. Dyslipidemia.  6. Mild chronic kidney disease.  7. Prior Inna's procedure.     RECOMMENDATIONS:  The patient appears stable from a cardiac standpoint with no recent cardiac events or symptoms to suggest angina or heart failure.  Okay to proceed with surgery at acceptable cardiac risk.  Continue IV metoprolol perioperatively and until the patient is able to take p.o.  can resume her prior oral cardiac medications at discharge including aspirin.  No additional recommendations at this time     4/25: No CP/dyspnea  -160; HR 80-90s; Intermittent pacing  K 4.5; Cr 1; Hg 12.5    Cardiac meds: metoprolol 2.5 IV q6;     Rec: add prn NTG paste for BP until taking her usual PO meds.  Stable cardiac status; Dispo per primary team  Restart usual home meds when able.    For other plans, see orders.  Hospital problem list     Active Hospital Problems    Diagnosis Date Noted    SBO (small bowel obstruction) ([facility]) 04/23/2024     Priority: Low        Subjective: Karlie Barrios reports       Chest pain X none  consistent with:  Non-cardiac CP         Atypical CP     None now  On going  Anginal CP     Dyspnea X none  at rest  with exertion         improved  unchanged  worse              PND X none  overnight       Orthopnea X none  improved  unchanged  worse   Presyncope X none  improved  unchanged  worse      Ambulated in hallway without symptoms   Yes   Ambulated in room without symptoms  Yes   Objective:     Physical Exam:  Overall VSSAF;  BP (!) 152/80   Pulse 89   Temp 98.4 °F (36.9 °C) (Oral)   Resp 16   Ht 1.651 m (5' 5\")   Wt 62 kg (136 lb 11 oz)   SpO2 96%   BMI 22.75 kg/m²   Temp (24hrs), Av.4 °F (36.9 °C), Min:97.7 °F (36.5 °C), Max:99.1 °F (37.3 °C)    Patient Vitals for the past 8 hrs:   Pulse   24 0845 89   24 0400 84   24 0220 89     Patient Vitals for the past 8 hrs:   Resp   24 0845 16   24 0400 16   24 0220 16     Patient Vitals for the past 8 hrs:   BP   24 0845 (!) 152/80   24 0400 (!) 156/62   24 0220 (!) 160/71       Intake/Output Summary (Last 24 hours) at 2024 1017  Last data filed at 2024 0745  Gross per 24 hour   Intake 1498.56 ml   Output 2500 ml   Net -1001.44 ml     General Appearance: Well developed, well nourished, no acute distress.   Ears/Nose/Mouth/Throat:   Normal MM; anicteric.   JVP: WNL   Resp:   Lungs clear to auscultation bilaterally.  Nl resp effort.   Cardiovascular:  RRR, S1, S2 normal, no new murmur. No gallop or rub.   Abdomen:   Soft, non-tender, bowel sounds are present.   Extremities: No edema bilaterally.    Skin:  Neuro: Warm and dry.  A/O x3, grossly nonfocal       cath site intact w/o hematoma or new bruit; distal pulse unchanged  Yes   Data Review:     Telemetry independently reviewed x sinus  chronic afib  parox afib  NSVT     ECG independently reviewed  NSR  afib  no significant changes  NSST-Tw chgs     x no new ECG provided for review   Lab results reviewed as noted below.  Current medications reviewed as noted below.    No results for input(s): \"PH\", \"PCO2\", \"PO2\" in the last 72 hours.  No results for input(s): \"CPK\", \"CKMB\" in the last 72 hours.    Invalid input(s): \"CKNDX\", \"TROIQ\"  Recent Labs     24  1350 24  0226 24  0348    138 137   K 4.3 4.4 4.5

## 2024-04-25 NOTE — PROGRESS NOTES
Attempted to schedule PCP hospital follow up appointment. Unable to reach anyone, unable to leave voicemail. [facility] placed [facility] information AVS for patient resource.  Pending patient discharge. Kayla Pace, Care Management Assistant

## 2024-04-25 NOTE — PLAN OF CARE
Problem: Discharge Planning  Goal: Discharge to home or other facility with appropriate resources  Outcome: Progressing     Problem: Safety - Adult  Goal: Free from fall injury  Outcome: Progressing     Problem: ABCDS Injury Assessment  Goal: Absence of physical injury  Outcome: Progressing     Problem: Skin/Tissue Integrity - Adult  Goal: Skin integrity remains intact  Outcome: Progressing  Flowsheets (Taken 4/24/2024 2106)  Skin Integrity Remains Intact: Monitor for areas of redness and/or skin breakdown     Problem: Gastrointestinal - Adult  Goal: Minimal or absence of nausea and vomiting  Outcome: Progressing

## 2024-04-25 NOTE — DISCHARGE SUMMARY
Discharge Summary    Patient ID:  Karlie Barrios  [id number]  female  73 y.o.  1950    Admit date: 4/23/2024    Discharge date: 4/25/2024    Admitting Physician: Eddie Parikh MD     Consulting Physician(s):   Treatment Team: Attending Provider: Eddie Parikh MD; Consulting Physician: Hill Waggoner MD; Utilization Reviewer: Melinda Stephen, RN; Consulting Physician: Mauricio Hinojosa MD; : Lana Heredia; Registered Nurse: Carito Ellis, RN; Registered Nurse: Chanell Leonard RN                         HPI:  Pt is a 73 y.o. female who presents at this time with SBO requiring acute care monitoring and/or treatment.    Problem List:   Patient Active Problem List   Diagnosis    Gallstone    NSTEMI (non-ST elevated myocardial infarction) ([facility])    SBO (small bowel obstruction) ([facility])        Hospital Course:  Patient was managed conservatively with bowel rest and improved as expected. On the day of discharge, patient was able to tolerate a diet. She had NGT decompression and gastrografin was given to ensure passage into the colon.     Pain Management:  Oxycodone     Transfusions:    Number of units banked PRBCs =   none     Activity: Patient mobilized with nursing and was found to be safe and steady with ambulation.     Discharged to: Home     Condition on Discharge: Stable     Discharge instructions:    - Take medications as prescribed  - Diet - low fiber  - Discharge activity:    - Activity as tolerated    - Ambulate several times a day   - Do not drive if taking opioid pain medications    Allergies:    Allergies   Allergen Reactions    Adhesive Tape Rash     Severe rash if adhesive on for more than one day    Scopolamine Other (See Comments)     Had scopolamine patch when removed area looked burned and skin red and irritated    Codeine Nausea And Vomiting    Sulfa Antibiotics Nausea And Vomiting    Trazodone Other (See Comments)     dizzy    Zolpidem Other (See Comments)     dizzy    Azithromycin

## 2024-04-25 NOTE — PLAN OF CARE
Problem: Discharge Planning  Goal: Discharge to home or other facility with appropriate resources  4/25/2024 1023 by Carito Ellis RN  Outcome: Progressing  4/25/2024 0755 by Noé Martinez RN  Outcome: Progressing     Problem: Safety - Adult  Goal: Free from fall injury  4/25/2024 1023 by Carito Ellis RN  Outcome: Progressing  4/25/2024 0755 by Noé Martinez RN  Outcome: Progressing     Problem: ABCDS Injury Assessment  Goal: Absence of physical injury  4/25/2024 1023 by Carito Ellis RN  Outcome: Progressing  4/25/2024 0755 by Noé Martinez RN  Outcome: Progressing     Problem: Pain  Goal: Verbalizes/displays adequate comfort level or baseline comfort level  4/25/2024 1023 by Carito Ellis RN  Outcome: Progressing  4/25/2024 0755 by Noé Martinez RN  Outcome: Progressing     Problem: Cardiovascular - Adult  Goal: Maintains optimal cardiac output and hemodynamic stability  Outcome: Progressing  Goal: Absence of cardiac dysrhythmias or at baseline  Outcome: Progressing     Problem: Skin/Tissue Integrity - Adult  Goal: Skin integrity remains intact  4/25/2024 1023 by Carito Ellis RN  Outcome: Progressing  4/25/2024 0755 by Noé Martinez RN  Outcome: Progressing  Flowsheets (Taken 4/24/2024 2106)  Skin Integrity Remains Intact: Monitor for areas of redness and/or skin breakdown     Problem: Gastrointestinal - Adult  Goal: Minimal or absence of nausea and vomiting  4/25/2024 1023 by Carito Ellis RN  Outcome: Progressing  4/25/2024 0755 by Noé Martinez RN  Outcome: Progressing  Goal: Maintains or returns to baseline bowel function  Outcome: Progressing  Goal: Maintains adequate nutritional intake  Outcome: Progressing     Problem: Metabolic/Fluid and Electrolytes - Adult  Goal: Electrolytes maintained within normal limits  Outcome: Progressing  Goal: Hemodynamic stability and optimal renal function maintained  Outcome: Progressing  Goal: Glucose maintained within prescribed  range  Outcome: Progressing

## 2024-04-25 NOTE — DISCHARGE INSTRUCTIONS
Bowel Obstruction: Care Instructions  Overview  A bowel blockage, also called an obstruction, can prevent gas, fluids, or food from moving through the intestines normally. It can cause constipation and, rarely, diarrhea. You may have pain, nausea, vomiting, and cramping.  Most of the time, complete blockages require a stay in the hospital and possibly surgery. But if your bowel is only partly blocked, your doctor may tell you to wait until it clears on its own and you are able to pass gas and stool. If so, there are things you can do at home to help make you feel better.  If you have had surgery for a bowel blockage, there are things you can do at home to make sure you heal well. You can also make some changes to keep your bowel from becoming blocked again.  Follow-up care is a key part of your treatment and safety. Be sure to make and go to all appointments, and call your doctor if you are having problems. It's also a good idea to know your test results and keep a list of the medicines you take.  How can you care for yourself at home?  Follow your doctor's instructions. These may include eating a liquid diet to avoid complete blockage.  Be safe with medicines. Take your medicines exactly as prescribed. Call your doctor if you think you are having a problem with your medicine.  Put a heating pad set on low on your belly to relieve mild cramps and pain.  If you had surgery:  You may shower 24 to 48 hours after surgery, if your doctor says it is okay. Pat the cut (incision) dry. Do not take a bath for the first 2 weeks, or until your doctor tells you it is okay.  If you have strips of tape on the cut (incision), leave the tape on until it falls off. Gently wash the area daily with warm, soapy water. Then rinse and pat it dry.  You may not have much of an appetite after the surgery. When you feel like eating, start with small amounts of food. Your doctor will tell you about any foods you should not eat.  What can

## 2024-04-25 NOTE — PLAN OF CARE
Problem: Discharge Planning  Goal: Discharge to home or other facility with appropriate resources  4/25/2024 1355 by Chanell Leonard RN  Outcome: Adequate for Discharge  4/25/2024 1023 by Carito Ellis RN  Outcome: Progressing  4/25/2024 0755 by Noé Martinez RN  Outcome: Progressing     Problem: Safety - Adult  Goal: Free from fall injury  4/25/2024 1355 by Chanell Leonard RN  Outcome: Adequate for Discharge  4/25/2024 1023 by Carito Ellis RN  Outcome: Progressing  4/25/2024 0755 by Noé Martinez RN  Outcome: Progressing     Problem: ABCDS Injury Assessment  Goal: Absence of physical injury  4/25/2024 1355 by Chanell Leonard RN  Outcome: Adequate for Discharge  4/25/2024 1023 by Carito Ellis RN  Outcome: Progressing  4/25/2024 0755 by Noé Martinez RN  Outcome: Progressing     Problem: Pain  Goal: Verbalizes/displays adequate comfort level or baseline comfort level  4/25/2024 1355 by Chanell Leonard RN  Outcome: Adequate for Discharge  4/25/2024 1023 by Carito Ellis RN  Outcome: Progressing  4/25/2024 0755 by Noé Martinez RN  Outcome: Progressing     Problem: Cardiovascular - Adult  Goal: Maintains optimal cardiac output and hemodynamic stability  4/25/2024 1355 by Chanell Leonard RN  Outcome: Adequate for Discharge  4/25/2024 1023 by Carito Ellis RN  Outcome: Progressing  Goal: Absence of cardiac dysrhythmias or at baseline  4/25/2024 1355 by Chanell Leonard RN  Outcome: Adequate for Discharge  4/25/2024 1023 by Carito Ellis RN  Outcome: Progressing     Problem: Skin/Tissue Integrity - Adult  Goal: Skin integrity remains intact  4/25/2024 1355 by Chanell Leonard RN  Outcome: Adequate for Discharge  4/25/2024 1023 by Carito Ellis RN  Outcome: Progressing  4/25/2024 0755 by Noé Martinez RN  Outcome: Progressing  Flowsheets (Taken 4/24/2024 2106)  Skin Integrity Remains Intact: Monitor for areas of redness and/or skin breakdown     Problem: Gastrointestinal -

## 2024-08-06 ENCOUNTER — APPOINTMENT (OUTPATIENT)
Facility: HOSPITAL | Age: 74
DRG: 390 | End: 2024-08-06
Payer: MEDICARE

## 2024-08-06 ENCOUNTER — HOSPITAL ENCOUNTER (INPATIENT)
Facility: HOSPITAL | Age: 74
LOS: 2 days | Discharge: HOME | DRG: 390 | End: 2024-08-08
Attending: STUDENT IN AN ORGANIZED HEALTH CARE EDUCATION/TRAINING PROGRAM | Admitting: SURGERY
Payer: MEDICARE

## 2024-08-06 DIAGNOSIS — K56.52 INTESTINAL ADHESIONS WITH COMPLETE OBSTRUCTION (HCC): Primary | ICD-10-CM

## 2024-08-06 LAB
ALBUMIN SERPL-MCNC: 4.8 G/DL (ref 3.5–5)
ALBUMIN/GLOB SERPL: 1.2 (ref 1.1–2.2)
ALP SERPL-CCNC: 128 U/L (ref 45–117)
ALT SERPL-CCNC: 24 U/L (ref 12–78)
ANION GAP SERPL CALC-SCNC: 11 MMOL/L (ref 5–15)
APPEARANCE UR: CLEAR
AST SERPL-CCNC: 16 U/L (ref 15–37)
BACTERIA URNS QL MICRO: NEGATIVE /HPF
BASOPHILS # BLD: 0.1 K/UL (ref 0–0.1)
BASOPHILS NFR BLD: 0 % (ref 0–1)
BILIRUB SERPL-MCNC: 0.7 MG/DL (ref 0.2–1)
BILIRUB UR QL: NEGATIVE
BUN SERPL-MCNC: 23 MG/DL (ref 6–20)
BUN/CREAT SERPL: 20 (ref 12–20)
CALCIUM SERPL-MCNC: 10.4 MG/DL (ref 8.5–10.1)
CHLORIDE SERPL-SCNC: 105 MMOL/L (ref 97–108)
CO2 SERPL-SCNC: 22 MMOL/L (ref 21–32)
COLOR UR: ABNORMAL
CREAT SERPL-MCNC: 1.17 MG/DL (ref 0.55–1.02)
DIFFERENTIAL METHOD BLD: ABNORMAL
EKG ATRIAL RATE: 87 BPM
EKG DIAGNOSIS: NORMAL
EKG P AXIS: 75 DEGREES
EKG P-R INTERVAL: 266 MS
EKG Q-T INTERVAL: 388 MS
EKG QRS DURATION: 132 MS
EKG QTC CALCULATION (BAZETT): 466 MS
EKG R AXIS: 251 DEGREES
EKG T AXIS: 69 DEGREES
EKG VENTRICULAR RATE: 87 BPM
EOSINOPHIL # BLD: 0 K/UL (ref 0–0.4)
EOSINOPHIL NFR BLD: 0 % (ref 0–7)
EPITH CASTS URNS QL MICRO: ABNORMAL /LPF
ERYTHROCYTE [DISTWIDTH] IN BLOOD BY AUTOMATED COUNT: 13.5 % (ref 11.5–14.5)
GLOBULIN SER CALC-MCNC: 4 G/DL (ref 2–4)
GLUCOSE SERPL-MCNC: 167 MG/DL (ref 65–100)
GLUCOSE UR STRIP.AUTO-MCNC: >1000 MG/DL
HCT VFR BLD AUTO: 47.2 % (ref 35–47)
HGB BLD-MCNC: 15.2 G/DL (ref 11.5–16)
HGB UR QL STRIP: NEGATIVE
HYALINE CASTS URNS QL MICRO: ABNORMAL /LPF (ref 0–2)
IMM GRANULOCYTES # BLD AUTO: 0.1 K/UL (ref 0–0.04)
IMM GRANULOCYTES NFR BLD AUTO: 1 % (ref 0–0.5)
KETONES UR QL STRIP.AUTO: ABNORMAL MG/DL
LEUKOCYTE ESTERASE UR QL STRIP.AUTO: NEGATIVE
LIPASE SERPL-CCNC: 56 U/L (ref 13–75)
LYMPHOCYTES # BLD: 0.9 K/UL (ref 0.8–3.5)
LYMPHOCYTES NFR BLD: 7 % (ref 12–49)
MCH RBC QN AUTO: 26.5 PG (ref 26–34)
MCHC RBC AUTO-ENTMCNC: 32.2 G/DL (ref 30–36.5)
MCV RBC AUTO: 82.4 FL (ref 80–99)
MONOCYTES # BLD: 0.7 K/UL (ref 0–1)
MONOCYTES NFR BLD: 5 % (ref 5–13)
NEUTS SEG # BLD: 11.6 K/UL (ref 1.8–8)
NEUTS SEG NFR BLD: 87 % (ref 32–75)
NITRITE UR QL STRIP.AUTO: NEGATIVE
NRBC # BLD: 0 K/UL (ref 0–0.01)
NRBC BLD-RTO: 0 PER 100 WBC
PH UR STRIP: 7 (ref 5–8)
PLATELET # BLD AUTO: 328 K/UL (ref 150–400)
PMV BLD AUTO: 8.7 FL (ref 8.9–12.9)
POTASSIUM SERPL-SCNC: 4.3 MMOL/L (ref 3.5–5.1)
PROT SERPL-MCNC: 8.8 G/DL (ref 6.4–8.2)
PROT UR STRIP-MCNC: 30 MG/DL
RBC # BLD AUTO: 5.73 M/UL (ref 3.8–5.2)
RBC #/AREA URNS HPF: ABNORMAL /HPF (ref 0–5)
SODIUM SERPL-SCNC: 138 MMOL/L (ref 136–145)
SP GR UR REFRACTOMETRY: 1.01 (ref 1–1.03)
TROPONIN I SERPL HS-MCNC: 5 NG/L (ref 0–51)
URINE CULTURE IF INDICATED: ABNORMAL
UROBILINOGEN UR QL STRIP.AUTO: 1 EU/DL (ref 0.2–1)
WBC # BLD AUTO: 13.3 K/UL (ref 3.6–11)
WBC URNS QL MICRO: ABNORMAL /HPF (ref 0–4)

## 2024-08-06 PROCEDURE — 6360000002 HC RX W HCPCS: Performed by: SURGERY

## 2024-08-06 PROCEDURE — 99222 1ST HOSP IP/OBS MODERATE 55: CPT | Performed by: SURGERY

## 2024-08-06 PROCEDURE — 74018 RADEX ABDOMEN 1 VIEW: CPT

## 2024-08-06 PROCEDURE — 6370000000 HC RX 637 (ALT 250 FOR IP): Performed by: SURGERY

## 2024-08-06 PROCEDURE — 74176 CT ABD & PELVIS W/O CONTRAST: CPT

## 2024-08-06 PROCEDURE — 1100000003 HC PRIVATE W/ TELEMETRY

## 2024-08-06 PROCEDURE — 81001 URINALYSIS AUTO W/SCOPE: CPT

## 2024-08-06 PROCEDURE — 80053 COMPREHEN METABOLIC PANEL: CPT

## 2024-08-06 PROCEDURE — 99285 EMERGENCY DEPT VISIT HI MDM: CPT

## 2024-08-06 PROCEDURE — 93005 ELECTROCARDIOGRAM TRACING: CPT | Performed by: STUDENT IN AN ORGANIZED HEALTH CARE EDUCATION/TRAINING PROGRAM

## 2024-08-06 PROCEDURE — 2580000003 HC RX 258: Performed by: SURGERY

## 2024-08-06 PROCEDURE — 85025 COMPLETE CBC W/AUTO DIFF WBC: CPT

## 2024-08-06 PROCEDURE — 96376 TX/PRO/DX INJ SAME DRUG ADON: CPT

## 2024-08-06 PROCEDURE — 6360000002 HC RX W HCPCS: Performed by: STUDENT IN AN ORGANIZED HEALTH CARE EDUCATION/TRAINING PROGRAM

## 2024-08-06 PROCEDURE — 0D9670Z DRAINAGE OF STOMACH WITH DRAINAGE DEVICE, VIA NATURAL OR ARTIFICIAL OPENING: ICD-10-PCS | Performed by: SURGERY

## 2024-08-06 PROCEDURE — 96374 THER/PROPH/DIAG INJ IV PUSH: CPT

## 2024-08-06 PROCEDURE — 36415 COLL VENOUS BLD VENIPUNCTURE: CPT

## 2024-08-06 PROCEDURE — 83690 ASSAY OF LIPASE: CPT

## 2024-08-06 PROCEDURE — 2500000003 HC RX 250 WO HCPCS: Performed by: SURGERY

## 2024-08-06 PROCEDURE — 71045 X-RAY EXAM CHEST 1 VIEW: CPT

## 2024-08-06 PROCEDURE — 6370000000 HC RX 637 (ALT 250 FOR IP): Performed by: STUDENT IN AN ORGANIZED HEALTH CARE EDUCATION/TRAINING PROGRAM

## 2024-08-06 PROCEDURE — 96375 TX/PRO/DX INJ NEW DRUG ADDON: CPT

## 2024-08-06 PROCEDURE — 84484 ASSAY OF TROPONIN QUANT: CPT

## 2024-08-06 PROCEDURE — 2580000003 HC RX 258: Performed by: STUDENT IN AN ORGANIZED HEALTH CARE EDUCATION/TRAINING PROGRAM

## 2024-08-06 RX ORDER — ONDANSETRON 2 MG/ML
4 INJECTION INTRAMUSCULAR; INTRAVENOUS ONCE
Status: COMPLETED | OUTPATIENT
Start: 2024-08-06 | End: 2024-08-06

## 2024-08-06 RX ORDER — ENOXAPARIN SODIUM 100 MG/ML
40 INJECTION SUBCUTANEOUS DAILY
Status: DISCONTINUED | OUTPATIENT
Start: 2024-08-06 | End: 2024-08-08 | Stop reason: HOSPADM

## 2024-08-06 RX ORDER — HYDROMORPHONE HYDROCHLORIDE 1 MG/ML
0.5 INJECTION, SOLUTION INTRAMUSCULAR; INTRAVENOUS; SUBCUTANEOUS
Status: DISCONTINUED | OUTPATIENT
Start: 2024-08-06 | End: 2024-08-08 | Stop reason: HOSPADM

## 2024-08-06 RX ORDER — SODIUM CHLORIDE 0.9 % (FLUSH) 0.9 %
5-40 SYRINGE (ML) INJECTION PRN
Status: DISCONTINUED | OUTPATIENT
Start: 2024-08-06 | End: 2024-08-08 | Stop reason: HOSPADM

## 2024-08-06 RX ORDER — LORAZEPAM 2 MG/ML
0.5 INJECTION INTRAMUSCULAR ONCE
Status: COMPLETED | OUTPATIENT
Start: 2024-08-06 | End: 2024-08-06

## 2024-08-06 RX ORDER — SODIUM CHLORIDE 9 MG/ML
INJECTION, SOLUTION INTRAVENOUS PRN
Status: DISCONTINUED | OUTPATIENT
Start: 2024-08-06 | End: 2024-08-08 | Stop reason: HOSPADM

## 2024-08-06 RX ORDER — SODIUM CHLORIDE 0.9 % (FLUSH) 0.9 %
5-40 SYRINGE (ML) INJECTION EVERY 12 HOURS SCHEDULED
Status: DISCONTINUED | OUTPATIENT
Start: 2024-08-06 | End: 2024-08-08 | Stop reason: HOSPADM

## 2024-08-06 RX ORDER — HYDROMORPHONE HYDROCHLORIDE 1 MG/ML
0.5 INJECTION, SOLUTION INTRAMUSCULAR; INTRAVENOUS; SUBCUTANEOUS ONCE
Status: COMPLETED | OUTPATIENT
Start: 2024-08-06 | End: 2024-08-06

## 2024-08-06 RX ORDER — ONDANSETRON 4 MG/1
4 TABLET, ORALLY DISINTEGRATING ORAL ONCE
Status: COMPLETED | OUTPATIENT
Start: 2024-08-06 | End: 2024-08-06

## 2024-08-06 RX ORDER — POTASSIUM CHLORIDE 7.45 MG/ML
10 INJECTION INTRAVENOUS PRN
Status: DISCONTINUED | OUTPATIENT
Start: 2024-08-06 | End: 2024-08-08 | Stop reason: HOSPADM

## 2024-08-06 RX ORDER — IPRATROPIUM BROMIDE 42 UG/1
2 SPRAY, METERED NASAL DAILY PRN
COMMUNITY

## 2024-08-06 RX ORDER — DIAZEPAM 5 MG/ML
5 INJECTION, SOLUTION INTRAMUSCULAR; INTRAVENOUS EVERY 4 HOURS PRN
Status: DISCONTINUED | OUTPATIENT
Start: 2024-08-06 | End: 2024-08-08 | Stop reason: HOSPADM

## 2024-08-06 RX ORDER — POTASSIUM CHLORIDE 29.8 MG/ML
20 INJECTION INTRAVENOUS PRN
Status: DISCONTINUED | OUTPATIENT
Start: 2024-08-06 | End: 2024-08-08 | Stop reason: HOSPADM

## 2024-08-06 RX ORDER — DEXTROSE MONOHYDRATE, SODIUM CHLORIDE, AND POTASSIUM CHLORIDE 50; 1.49; 4.5 G/1000ML; G/1000ML; G/1000ML
INJECTION, SOLUTION INTRAVENOUS CONTINUOUS
Status: DISCONTINUED | OUTPATIENT
Start: 2024-08-06 | End: 2024-08-08

## 2024-08-06 RX ORDER — NITROGLYCERIN 0.4 MG/1
0.4 TABLET SUBLINGUAL EVERY 5 MIN PRN
COMMUNITY

## 2024-08-06 RX ORDER — ONDANSETRON 4 MG/1
4 TABLET, ORALLY DISINTEGRATING ORAL EVERY 8 HOURS PRN
Status: DISCONTINUED | OUTPATIENT
Start: 2024-08-06 | End: 2024-08-08 | Stop reason: HOSPADM

## 2024-08-06 RX ORDER — MAGNESIUM SULFATE IN WATER 40 MG/ML
2000 INJECTION, SOLUTION INTRAVENOUS PRN
Status: DISCONTINUED | OUTPATIENT
Start: 2024-08-06 | End: 2024-08-08 | Stop reason: HOSPADM

## 2024-08-06 RX ORDER — ONDANSETRON 2 MG/ML
4 INJECTION INTRAMUSCULAR; INTRAVENOUS EVERY 6 HOURS PRN
Status: DISCONTINUED | OUTPATIENT
Start: 2024-08-06 | End: 2024-08-06 | Stop reason: SDUPTHER

## 2024-08-06 RX ORDER — ONDANSETRON 2 MG/ML
4 INJECTION INTRAMUSCULAR; INTRAVENOUS EVERY 6 HOURS PRN
Status: DISCONTINUED | OUTPATIENT
Start: 2024-08-06 | End: 2024-08-08 | Stop reason: HOSPADM

## 2024-08-06 RX ORDER — HYDROMORPHONE HYDROCHLORIDE 1 MG/ML
0.25 INJECTION, SOLUTION INTRAMUSCULAR; INTRAVENOUS; SUBCUTANEOUS
Status: DISCONTINUED | OUTPATIENT
Start: 2024-08-06 | End: 2024-08-08 | Stop reason: HOSPADM

## 2024-08-06 RX ORDER — MORPHINE SULFATE 4 MG/ML
4 INJECTION, SOLUTION INTRAMUSCULAR; INTRAVENOUS
Status: COMPLETED | OUTPATIENT
Start: 2024-08-06 | End: 2024-08-06

## 2024-08-06 RX ORDER — 0.9 % SODIUM CHLORIDE 0.9 %
1000 INTRAVENOUS SOLUTION INTRAVENOUS ONCE
Status: COMPLETED | OUTPATIENT
Start: 2024-08-06 | End: 2024-08-06

## 2024-08-06 RX ADMIN — POTASSIUM CHLORIDE, DEXTROSE MONOHYDRATE AND SODIUM CHLORIDE: 150; 5; 450 INJECTION, SOLUTION INTRAVENOUS at 15:15

## 2024-08-06 RX ADMIN — POTASSIUM CHLORIDE, DEXTROSE MONOHYDRATE AND SODIUM CHLORIDE: 150; 5; 450 INJECTION, SOLUTION INTRAVENOUS at 11:49

## 2024-08-06 RX ADMIN — MORPHINE SULFATE 4 MG: 4 INJECTION, SOLUTION INTRAMUSCULAR; INTRAVENOUS at 06:54

## 2024-08-06 RX ADMIN — ONDANSETRON 4 MG: 4 TABLET, ORALLY DISINTEGRATING ORAL at 06:45

## 2024-08-06 RX ADMIN — PHENOL 1 SPRAY: 1.4 SPRAY ORAL at 21:38

## 2024-08-06 RX ADMIN — HYDROMORPHONE HYDROCHLORIDE 0.5 MG: 1 INJECTION, SOLUTION INTRAMUSCULAR; INTRAVENOUS; SUBCUTANEOUS at 10:49

## 2024-08-06 RX ADMIN — SODIUM CHLORIDE 1000 ML: 9 INJECTION, SOLUTION INTRAVENOUS at 06:54

## 2024-08-06 RX ADMIN — ONDANSETRON 4 MG: 2 INJECTION INTRAMUSCULAR; INTRAVENOUS at 10:36

## 2024-08-06 RX ADMIN — LORAZEPAM 0.5 MG: 2 INJECTION INTRAMUSCULAR; INTRAVENOUS at 10:36

## 2024-08-06 RX ADMIN — ENOXAPARIN SODIUM 40 MG: 100 INJECTION SUBCUTANEOUS at 11:50

## 2024-08-06 RX ADMIN — SODIUM CHLORIDE, PRESERVATIVE FREE 10 ML: 5 INJECTION INTRAVENOUS at 15:14

## 2024-08-06 RX ADMIN — SODIUM CHLORIDE, PRESERVATIVE FREE 10 ML: 5 INJECTION INTRAVENOUS at 19:51

## 2024-08-06 RX ADMIN — ONDANSETRON 4 MG: 2 INJECTION INTRAMUSCULAR; INTRAVENOUS at 06:55

## 2024-08-06 RX ADMIN — TOPICAL ANESTHETIC: 200 SPRAY DENTAL; PERIODONTAL at 10:36

## 2024-08-06 RX ADMIN — POTASSIUM CHLORIDE, DEXTROSE MONOHYDRATE AND SODIUM CHLORIDE: 150; 5; 450 INJECTION, SOLUTION INTRAVENOUS at 21:38

## 2024-08-06 ASSESSMENT — PAIN DESCRIPTION - LOCATION
LOCATION: ABDOMEN

## 2024-08-06 ASSESSMENT — ENCOUNTER SYMPTOMS
DIARRHEA: 0
CONSTIPATION: 0
COUGH: 0
SORE THROAT: 0
NAUSEA: 1
SHORTNESS OF BREATH: 0
VOMITING: 1
ABDOMINAL DISTENTION: 0
ABDOMINAL PAIN: 1
BACK PAIN: 0

## 2024-08-06 ASSESSMENT — PAIN SCALES - GENERAL
PAINLEVEL_OUTOF10: 9
PAINLEVEL_OUTOF10: 4
PAINLEVEL_OUTOF10: 9

## 2024-08-06 ASSESSMENT — LIFESTYLE VARIABLES
HOW OFTEN DO YOU HAVE A DRINK CONTAINING ALCOHOL: NEVER
HOW MANY STANDARD DRINKS CONTAINING ALCOHOL DO YOU HAVE ON A TYPICAL DAY: PATIENT DOES NOT DRINK

## 2024-08-06 ASSESSMENT — PAIN - FUNCTIONAL ASSESSMENT: PAIN_FUNCTIONAL_ASSESSMENT: 0-10

## 2024-08-06 NOTE — ED PROVIDER NOTES
diazePAM (VALIUM) injection 5 mg (has no administration in time range)   ondansetron (ZOFRAN) injection 4 mg (has no administration in time range)   benzocaine (HURRICAINE) 20 % oral spray (has no administration in time range)   LORazepam (ATIVAN) injection 0.5 mg (has no administration in time range)   morphine sulfate (PF) injection 4 mg (4 mg IntraVENous Given 8/6/24 0654)   sodium chloride 0.9 % bolus 1,000 mL (0 mLs IntraVENous Stopped 8/6/24 1004)   ondansetron (ZOFRAN-ODT) disintegrating tablet 4 mg (4 mg Oral Given 8/6/24 0645)       CONSULTS: (Who and What was discussed)  None      Chronic Conditions: as above    Social Determinants affecting Dx or Tx: None    Records Reviewed (source and summary of external notes): Nursing Notes and Old Medical Records    CC/HPI Summary, DDx, ED Course, and Reassessment: 74-year-old female with past medical history of partial bowel obstruction that was medically managed 4/2024, colonic perforation during colonoscopy that 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  Patient's vital signs are stable.  Abdominal exma reassuring and does not present an acute surgical abdomen. She appears nontoxic on exam.  Differential includes but not limited to bowel obstruction, viral illness, pancreatitis, electrolyte abnormalities, anemia, dehydration, gallbladder disease, less likely ACS. Will evaluate with labs, imaging, and give IVF, pain meds and nausea meds. Patient agreeable to the plan.    ED Course as of 08/06/24 1023   Tue Aug 06, 2024   0716 Symptoms resolved with medical management. [LD]   0830 Labs reveal mild leukocytosis, normal hemoglobin, mildly elevated creatinine and BUN, negative troponin, negative lipase.  UA pending.  Chest x-ray within normal limits.  CT abdomen pelvis pending. [LD]   1019 Spoke with Dr Parikh about the patient's bowel obstruction. He will admit.

## 2024-08-06 NOTE — CARE COORDINATION
Care Management Initial Assessment       RUR: n/a, inpatient   Readmission? No  1st IM letter given? No, patient access to provide  1st  letter given: N/A, not /[facility] affiliated    Initial Assessment: Chart reviewed. CM met with  at bedside (pt getting xray), introduced role and confirmed demographic information in the chart - reviewed full code and next of kin:  Harmeet Barrios [phone].  Pt and  reside in a single story home with 2 MARIANNA. Pt performs ADL independently without device - pt is active . Family will transport home at discharge.  PCP: Dr. Jamison (< 6 months).   Pharmacy of choice: [facility] at [address], [phone].  No prior history of HH/SNF/IPR.   DME: none  CM will continue to follow for discharge planning. Full assessment below:     08/06/24 1117   Service Assessment   Patient Orientation Alert and Oriented   Cognition Alert   History Provided By Significant Other;Spouse  (Harmeet Barrios - pt in process getting ray)   Primary Caregiver Self   Support Systems Spouse/Significant Other;Children;Family Members;Friends/Neighbors   Patient's Healthcare Decision Maker is: Legal Next of Kin   PCP Verified by CM Yes   Last Visit to PCP Within last 3 months   Prior Functional Level Independent in ADLs/IADLs   Current Functional Level Independent in ADLs/IADLs   Can patient return to prior living arrangement Yes   Ability to make needs known: Good   Family able to assist with home care needs: Yes   Would you like for me to discuss the discharge plan with any other family members/significant others, and if so, who? No  (family will transport at discharge)   Financial Resources Medicare  (Medicare A/B and [employer] Medicare supp)   Community Resources None   CM/SW Referral   (not needed at present time)   Social/Functional History   Type of Home House   Home Layout One level   Home Access Stairs to enter with rails   Entrance Stairs - Number of

## 2024-08-06 NOTE — ED NOTES
Bedside and Verbal shift change report given to TRENT Burk (oncoming nurse) by TRENT Aguayo (offgoing nurse). Report included the following information Nurse Handoff Report, ED Encounter Summary, ED SBAR, MAR, Recent Results, Med Rec Status, and Neuro Assessment.

## 2024-08-06 NOTE — H&P
1.015 1.003 - 1.030      pH, Urine 7.0 5.0 - 8.0      Protein, UA 30 (A) NEG mg/dL    Glucose, Ur >1000 (A) NEG mg/dL    Ketones, Urine TRACE (A) NEG mg/dL    Bilirubin, Urine Negative NEG      Blood, Urine Negative NEG      Urobilinogen, Urine 1.0 0.2 - 1.0 EU/dL    Nitrite, Urine Negative NEG      Leukocyte Esterase, Urine Negative NEG      Urine Culture if Indicated CULTURE NOT INDICATED BY UA RESULT      WBC, UA 0-4 0 - 4 /hpf    RBC, UA 0-5 0 - 5 /hpf    Epithelial Cells, UA FEW FEW /lpf    BACTERIA, URINE Negative NEG /hpf    Hyaline Casts, UA 0-2 0 - 2 /lpf        Imaging/Diagnostics Last 24 Hours   CT ABDOMEN PELVIS WO CONTRAST Additional Contrast? None    Result Date: 8/6/2024  EXAM: CT ABDOMEN PELVIS WO CONTRAST ACC#: [id number]  INDICATION: pain and vomiting  COMPARISON: None. IV CONTRAST: None. ORAL CONTRAST: None. TECHNIQUE: Thin axial images were obtained through the abdomen and pelvis. Coronal and sagittal reformats were generated. CT dose reduction was achieved through use of a standardized protocol tailored for this examination and automatic exposure control for dose modulation. FINDINGS: There are atelectatic changes in the visualized lung bases. Bilateral breast implants are partially visualized. No obvious masses are visualized in the liver.   No obvious masses in the spleen.   The pancreas is grossly unremarkable. The gallbladder is surgically absent. No significant biliary dilatation. Bilateral adrenal glands appear normal.  There is no hydronephrosis.  There are no stones in the kidneys.  There are no stones in the bilateral ureters. No stones in the bladder.  No bladder wall thickening. There is no free fluid, free air, or abscesses. No significant lymphadenopathy. There are dilated loops of fluid-filled small bowel. Distal small bowel is are decompressed. Transition point may be in the lower central abdomen. There is stool and gas within the colon. There is colonic diverticulosis. The

## 2024-08-06 NOTE — ED NOTES
Pt rang out reporting that her IV infiltrated. There is swelling to her L forearm, IV removed, ace wrap and ice pack applied to site

## 2024-08-07 ENCOUNTER — APPOINTMENT (OUTPATIENT)
Facility: HOSPITAL | Age: 74
DRG: 390 | End: 2024-08-07
Payer: MEDICARE

## 2024-08-07 LAB
ANION GAP SERPL CALC-SCNC: 7 MMOL/L (ref 5–15)
BASOPHILS # BLD: 0.1 K/UL (ref 0–0.1)
BASOPHILS NFR BLD: 1 % (ref 0–1)
BUN SERPL-MCNC: 19 MG/DL (ref 6–20)
BUN/CREAT SERPL: 18 (ref 12–20)
CALCIUM SERPL-MCNC: 8.6 MG/DL (ref 8.5–10.1)
CHLORIDE SERPL-SCNC: 110 MMOL/L (ref 97–108)
CO2 SERPL-SCNC: 22 MMOL/L (ref 21–32)
CREAT SERPL-MCNC: 1.05 MG/DL (ref 0.55–1.02)
DIFFERENTIAL METHOD BLD: ABNORMAL
EOSINOPHIL # BLD: 0.4 K/UL (ref 0–0.4)
EOSINOPHIL NFR BLD: 5 % (ref 0–7)
ERYTHROCYTE [DISTWIDTH] IN BLOOD BY AUTOMATED COUNT: 13.9 % (ref 11.5–14.5)
GLUCOSE SERPL-MCNC: 110 MG/DL (ref 65–100)
HCT VFR BLD AUTO: 36.1 % (ref 35–47)
HGB BLD-MCNC: 11.3 G/DL (ref 11.5–16)
IMM GRANULOCYTES # BLD AUTO: 0 K/UL (ref 0–0.04)
IMM GRANULOCYTES NFR BLD AUTO: 0 % (ref 0–0.5)
LYMPHOCYTES # BLD: 2.7 K/UL (ref 0.8–3.5)
LYMPHOCYTES NFR BLD: 37 % (ref 12–49)
MCH RBC QN AUTO: 26.5 PG (ref 26–34)
MCHC RBC AUTO-ENTMCNC: 31.3 G/DL (ref 30–36.5)
MCV RBC AUTO: 84.7 FL (ref 80–99)
MONOCYTES # BLD: 0.7 K/UL (ref 0–1)
MONOCYTES NFR BLD: 9 % (ref 5–13)
NEUTS SEG # BLD: 3.5 K/UL (ref 1.8–8)
NEUTS SEG NFR BLD: 48 % (ref 32–75)
NRBC # BLD: 0 K/UL (ref 0–0.01)
NRBC BLD-RTO: 0 PER 100 WBC
PHOSPHATE SERPL-MCNC: 2.5 MG/DL (ref 2.6–4.7)
PLATELET # BLD AUTO: 262 K/UL (ref 150–400)
PMV BLD AUTO: 9 FL (ref 8.9–12.9)
POTASSIUM SERPL-SCNC: 4.3 MMOL/L (ref 3.5–5.1)
RBC # BLD AUTO: 4.26 M/UL (ref 3.8–5.2)
SODIUM SERPL-SCNC: 139 MMOL/L (ref 136–145)
WBC # BLD AUTO: 7.2 K/UL (ref 3.6–11)

## 2024-08-07 PROCEDURE — 6360000002 HC RX W HCPCS: Performed by: SURGERY

## 2024-08-07 PROCEDURE — 2500000003 HC RX 250 WO HCPCS: Performed by: SURGERY

## 2024-08-07 PROCEDURE — 6360000004 HC RX CONTRAST MEDICATION: Performed by: NURSE PRACTITIONER

## 2024-08-07 PROCEDURE — 36415 COLL VENOUS BLD VENIPUNCTURE: CPT

## 2024-08-07 PROCEDURE — 84100 ASSAY OF PHOSPHORUS: CPT

## 2024-08-07 PROCEDURE — 85025 COMPLETE CBC W/AUTO DIFF WBC: CPT

## 2024-08-07 PROCEDURE — 99231 SBSQ HOSP IP/OBS SF/LOW 25: CPT | Performed by: SURGERY

## 2024-08-07 PROCEDURE — 2580000003 HC RX 258: Performed by: SURGERY

## 2024-08-07 PROCEDURE — 74019 RADEX ABDOMEN 2 VIEWS: CPT

## 2024-08-07 PROCEDURE — 80048 BASIC METABOLIC PNL TOTAL CA: CPT

## 2024-08-07 PROCEDURE — 1100000003 HC PRIVATE W/ TELEMETRY

## 2024-08-07 PROCEDURE — 94760 N-INVAS EAR/PLS OXIMETRY 1: CPT

## 2024-08-07 RX ADMIN — HYDROMORPHONE HYDROCHLORIDE 0.5 MG: 1 INJECTION, SOLUTION INTRAMUSCULAR; INTRAVENOUS; SUBCUTANEOUS at 10:11

## 2024-08-07 RX ADMIN — ENOXAPARIN SODIUM 40 MG: 100 INJECTION SUBCUTANEOUS at 10:00

## 2024-08-07 RX ADMIN — POTASSIUM CHLORIDE, DEXTROSE MONOHYDRATE AND SODIUM CHLORIDE: 150; 5; 450 INJECTION, SOLUTION INTRAVENOUS at 05:30

## 2024-08-07 RX ADMIN — SODIUM CHLORIDE, PRESERVATIVE FREE 10 ML: 5 INJECTION INTRAVENOUS at 20:17

## 2024-08-07 RX ADMIN — HYDROMORPHONE HYDROCHLORIDE 0.25 MG: 1 INJECTION, SOLUTION INTRAMUSCULAR; INTRAVENOUS; SUBCUTANEOUS at 05:25

## 2024-08-07 RX ADMIN — DIATRIZOATE MEGLUMINE AND DIATRIZOATE SODIUM 80 ML: 660; 100 LIQUID ORAL; RECTAL at 10:06

## 2024-08-07 RX ADMIN — ONDANSETRON 4 MG: 2 INJECTION INTRAMUSCULAR; INTRAVENOUS at 10:12

## 2024-08-07 RX ADMIN — SODIUM CHLORIDE, PRESERVATIVE FREE 10 ML: 5 INJECTION INTRAVENOUS at 10:12

## 2024-08-07 ASSESSMENT — PAIN DESCRIPTION - ORIENTATION
ORIENTATION: MID
ORIENTATION: RIGHT
ORIENTATION: RIGHT

## 2024-08-07 ASSESSMENT — PAIN SCALES - GENERAL
PAINLEVEL_OUTOF10: 4
PAINLEVEL_OUTOF10: 2
PAINLEVEL_OUTOF10: 5

## 2024-08-07 ASSESSMENT — PAIN DESCRIPTION - LOCATION
LOCATION: ABDOMEN

## 2024-08-07 ASSESSMENT — PAIN DESCRIPTION - DESCRIPTORS
DESCRIPTORS: STABBING
DESCRIPTORS: CRAMPING;THROBBING
DESCRIPTORS: STABBING

## 2024-08-07 ASSESSMENT — PAIN - FUNCTIONAL ASSESSMENT
PAIN_FUNCTIONAL_ASSESSMENT: ACTIVITIES ARE NOT PREVENTED
PAIN_FUNCTIONAL_ASSESSMENT: ACTIVITIES ARE NOT PREVENTED

## 2024-08-08 ENCOUNTER — APPOINTMENT (OUTPATIENT)
Facility: HOSPITAL | Age: 74
DRG: 390 | End: 2024-08-08
Payer: MEDICARE

## 2024-08-08 VITALS
RESPIRATION RATE: 16 BRPM | DIASTOLIC BLOOD PRESSURE: 67 MMHG | BODY MASS INDEX: 22.88 KG/M2 | TEMPERATURE: 98.2 F | SYSTOLIC BLOOD PRESSURE: 143 MMHG | HEART RATE: 68 BPM | OXYGEN SATURATION: 95 % | HEIGHT: 65 IN | WEIGHT: 137.35 LBS

## 2024-08-08 LAB
ANION GAP SERPL CALC-SCNC: 3 MMOL/L (ref 5–15)
BASOPHILS # BLD: 0.1 K/UL (ref 0–0.1)
BASOPHILS NFR BLD: 1 % (ref 0–1)
BUN SERPL-MCNC: 10 MG/DL (ref 6–20)
BUN/CREAT SERPL: 12 (ref 12–20)
CALCIUM SERPL-MCNC: 8.6 MG/DL (ref 8.5–10.1)
CHLORIDE SERPL-SCNC: 112 MMOL/L (ref 97–108)
CO2 SERPL-SCNC: 23 MMOL/L (ref 21–32)
CREAT SERPL-MCNC: 0.83 MG/DL (ref 0.55–1.02)
DIFFERENTIAL METHOD BLD: ABNORMAL
EOSINOPHIL # BLD: 0.4 K/UL (ref 0–0.4)
EOSINOPHIL NFR BLD: 5 % (ref 0–7)
ERYTHROCYTE [DISTWIDTH] IN BLOOD BY AUTOMATED COUNT: 13.5 % (ref 11.5–14.5)
GLUCOSE SERPL-MCNC: 96 MG/DL (ref 65–100)
HCT VFR BLD AUTO: 36.3 % (ref 35–47)
HGB BLD-MCNC: 11.3 G/DL (ref 11.5–16)
IMM GRANULOCYTES # BLD AUTO: 0 K/UL (ref 0–0.04)
IMM GRANULOCYTES NFR BLD AUTO: 0 % (ref 0–0.5)
LYMPHOCYTES # BLD: 2 K/UL (ref 0.8–3.5)
LYMPHOCYTES NFR BLD: 26 % (ref 12–49)
MCH RBC QN AUTO: 26.4 PG (ref 26–34)
MCHC RBC AUTO-ENTMCNC: 31.1 G/DL (ref 30–36.5)
MCV RBC AUTO: 84.8 FL (ref 80–99)
MONOCYTES # BLD: 0.6 K/UL (ref 0–1)
MONOCYTES NFR BLD: 8 % (ref 5–13)
NEUTS SEG # BLD: 4.6 K/UL (ref 1.8–8)
NEUTS SEG NFR BLD: 60 % (ref 32–75)
NRBC # BLD: 0 K/UL (ref 0–0.01)
NRBC BLD-RTO: 0 PER 100 WBC
PLATELET # BLD AUTO: 238 K/UL (ref 150–400)
PMV BLD AUTO: 8.7 FL (ref 8.9–12.9)
POTASSIUM SERPL-SCNC: 4 MMOL/L (ref 3.5–5.1)
RBC # BLD AUTO: 4.28 M/UL (ref 3.8–5.2)
SODIUM SERPL-SCNC: 138 MMOL/L (ref 136–145)
WBC # BLD AUTO: 7.7 K/UL (ref 3.6–11)

## 2024-08-08 PROCEDURE — 85025 COMPLETE CBC W/AUTO DIFF WBC: CPT

## 2024-08-08 PROCEDURE — 36415 COLL VENOUS BLD VENIPUNCTURE: CPT

## 2024-08-08 PROCEDURE — 94760 N-INVAS EAR/PLS OXIMETRY 1: CPT

## 2024-08-08 PROCEDURE — 2500000003 HC RX 250 WO HCPCS: Performed by: SURGERY

## 2024-08-08 PROCEDURE — 74019 RADEX ABDOMEN 2 VIEWS: CPT

## 2024-08-08 PROCEDURE — 6360000002 HC RX W HCPCS: Performed by: SURGERY

## 2024-08-08 PROCEDURE — 99238 HOSP IP/OBS DSCHRG MGMT 30/<: CPT | Performed by: NURSE PRACTITIONER

## 2024-08-08 PROCEDURE — 2580000003 HC RX 258: Performed by: SURGERY

## 2024-08-08 PROCEDURE — 80048 BASIC METABOLIC PNL TOTAL CA: CPT

## 2024-08-08 RX ADMIN — POTASSIUM CHLORIDE, DEXTROSE MONOHYDRATE AND SODIUM CHLORIDE: 150; 5; 450 INJECTION, SOLUTION INTRAVENOUS at 00:54

## 2024-08-08 RX ADMIN — SODIUM CHLORIDE, PRESERVATIVE FREE 10 ML: 5 INJECTION INTRAVENOUS at 09:40

## 2024-08-08 RX ADMIN — ENOXAPARIN SODIUM 40 MG: 100 INJECTION SUBCUTANEOUS at 09:38

## 2024-08-08 NOTE — DISCHARGE SUMMARY
tablet     AZELASTINE-FLUTICASONE-NACL NA     calcium carbonate 500 MG Tabs tablet  Commonly known as: OSCAL     carvedilol 6.25 MG tablet  Commonly known as: COREG     dapagliflozin 10 MG tablet  Commonly known as: FARXIGA     ipratropium 0.06 % nasal spray  Commonly known as: ATROVENT     lidocaine 5 %  Commonly known as: LIDODERM     nitroGLYCERIN 0.4 MG SL tablet  Commonly known as: NITROSTAT     rosuvastatin 20 MG tablet  Commonly known as: CRESTOR     sacubitril-valsartan 49-51 MG per tablet  Commonly known as: ENTRESTO     vitamin C 500 MG tablet  Commonly known as: ASCORBIC ACID     zinc gluconate 50 MG tablet            STOP taking these medications      losartan 25 MG tablet  Commonly known as: COZAAR     spironolactone 25 MG tablet  Commonly known as: ALDACTONE     ticagrelor 90 MG Tabs tablet  Commonly known as: BRILINTA           per medical continuation form      - Follow up in office not required.       Signed:  Jenifer Vela  MSN, APRN, FNP-C, CWOCN-AP, RNAS-C  Surgical Nurse Practitioner    8/8/2024  10:07 AM

## 2024-08-08 NOTE — CARE COORDINATION
Transition of Care Plan:    RUR: 8%  Prior Level of Functioning:   Disposition: Home w/family  If SNF or IPR: Date FOC offered:   Date FOC received:   Accepting facility:   Date authorization started with reference number:   Date authorization received and expires:   Follow up appointments: CM unable to reach PCP office  DME needed: n/a  Transportation at discharge:   IM/IMM Medicare/ letter given: IM given 8/6  Is patient a  and connected with [facility]?    If yes, was [place] transfer form completed and [facility] notified?   Caregiver Contact:   Discharge Caregiver contacted prior to discharge? Pt will contact  Care Conference needed?   Barriers to discharge:     Patient is d/c home today without any needs.    Lana Heredia  Ext [phone]

## 2024-08-08 NOTE — PROGRESS NOTES
..End of Shift Note    Bedside shift change report given to TRENT Mao (oncoming nurse) by Rigoberto Slaughter RN (offgoing nurse).  Report included the following information SBAR    Shift worked:  0700 - 1900     Shift summary and any significant changes:    Pt. Arrived on unit in afternoon with family at bedside.  NG tube hooked to suction continued.  Patient resting comfortably.     Concerns for physician to address: No     Zone phone for oncoming shift:  No       Activity:     Number times ambulated in hallways past shift: 0  Number of times OOB to chair past shift: 0    Cardiac:   Cardiac Monitoring: Yes           Access:  Current line(s): PIV     Genitourinary:   Urinary status: voiding    Respiratory:      Chronic home O2 use?: NO  Incentive spirometer at bedside: NO       GI:     Current diet:  Diet NPO Exceptions are: Sips of Water with Meds, Ice Chips  Passing flatus: YES  Tolerating current diet: YES       Pain Management:   Patient states pain is manageable on current regimen: YES    Skin:     Interventions: increase time out of bed    Patient Safety:  Fall Score:    Interventions: gripper socks and pt to call before getting OOB       Length of Stay:  Expected LOS: 5  Actual LOS: 0      Rigoberto Slaughter RN                            
1341 - POST DISCHARGE NOTE: PCP hospital follow-up transitional care appointment has been scheduled with Dr. Jose Alejandro Jamison on 8/12/24 2615. PCP staff stated they will call the patient to give appt time/date information. [facility] placed [facility] information AVS for patient resource.   Pending patient discharge.  Kayla Pace Care Management Assistant     Attempted to schedule PCP hospital follow up appointment. Unable to reach anyone, left voicemail. [facility] placed [facility] information AVS for patient resource. Pending patient discharge. Kayla Pace Care Management Assistant  
Admit Date: 2024    Subjective:     Patient has no new complaints. Scant NG o/p and had a BM in ED before transfer to room.  + flatus.    Objective:     Blood pressure (!) 151/67, pulse 80, temperature 98.4 °F (36.9 °C), temperature source Oral, resp. rate 16, height 1.651 m (5' 5\"), weight 62.3 kg (137 lb 5.6 oz), SpO2 96 %.    Temp (24hrs), Av.2 °F (36.8 °C), Min:97.3 °F (36.3 °C), Max:98.8 °F (37.1 °C)      Physical Exam:  GENERAL: alert, appears stated age, and cooperative, LUNG: clear to auscultation bilaterally, HEART: regular rate and rhythm, ABDOMEN: soft, non-tender; bowel sounds normal; no masses,  no organomegaly, EXTREMITIES:  extremities normal, atraumatic, no cyanosis or edema    Labs:   Recent Results (from the past 24 hour(s))   Basic Metabolic Panel w/ Reflex to MG    Collection Time: 24  5:14 AM   Result Value Ref Range    Sodium 139 136 - 145 mmol/L    Potassium 4.3 3.5 - 5.1 mmol/L    Chloride 110 (H) 97 - 108 mmol/L    CO2 22 21 - 32 mmol/L    Anion Gap 7 5 - 15 mmol/L    Glucose 110 (H) 65 - 100 mg/dL    BUN 19 6 - 20 MG/DL    Creatinine 1.05 (H) 0.55 - 1.02 MG/DL    BUN/Creatinine Ratio 18 12 - 20      Est, Glom Filt Rate 55 (L) >60 ml/min/1.73m2    Calcium 8.6 8.5 - 10.1 MG/DL   Phosphorus    Collection Time: 24  5:14 AM   Result Value Ref Range    Phosphorus 2.5 (L) 2.6 - 4.7 MG/DL   CBC with Auto Differential    Collection Time: 24  5:14 AM   Result Value Ref Range    WBC 7.2 3.6 - 11.0 K/uL    RBC 4.26 3.80 - 5.20 M/uL    Hemoglobin 11.3 (L) 11.5 - 16.0 g/dL    Hematocrit 36.1 35.0 - 47.0 %    MCV 84.7 80.0 - 99.0 FL    MCH 26.5 26.0 - 34.0 PG    MCHC 31.3 30.0 - 36.5 g/dL    RDW 13.9 11.5 - 14.5 %    Platelets 262 150 - 400 K/uL    MPV 9.0 8.9 - 12.9 FL    Nucleated RBCs 0.0 0  WBC    nRBC 0.00 0.00 - 0.01 K/uL    Neutrophils % 48 32 - 75 %    Lymphocytes % 37 12 - 49 %    Monocytes % 9 5 - 13 %    Eosinophils % 5 0 - 7 %    Basophils % 1 0 - 1 %    
DISCHARGE NOTE FROM SURGICAL-TELEMETRY NURSE    Patient determined to be stable for discharge by attending provider. I have reviewed the discharge instructions and follow-up appointments with the Patient and Spouse. They verbalized understanding and all questions were answered to their satisfaction. No complaints or further questions were expressed.      No new medication scripts Appropriate educational materials and medication side effect teaching were provided.      PIV were removed prior to discharge.     Patient did not discharge with any line, mckenna, or drain.    All personal items collected during admission were returned to the patient prior to discharge.    Post-op patient: Mónica Linton LPN   
End of Shift Note    Bedside shift change report given to TRENT Kent (oncoming nurse) by Mayco Hayden RN (offgoing nurse).  Report included the following information SBAR    Shift worked:  8644-3076     Shift summary and any significant changes:    Pt is still hooked to low continuous ngt suction.  Pain meds given once  Up dlib to the bathroom  No N/V  Uneventful shift   Concerns for physician to address:    Zone phone for oncoming shift:             Mayco Hayden RN                            
End of Shift Note    Bedside shift change report given to TRENT Mcginnis (oncoming nurse) by PERI CRUZ RN (offgoing nurse).  Report included the following information SBAR, Kardex, MAR, and Recent Results    Shift worked:  6253-1896     Shift summary and any significant changes:     Patient A&O X4, VSS, RA. Denies any pain overnight. Denies any N/V. Will continue to monitor.      Concerns for physician to address:  N/A     Zone phone for oncoming shift:          Activity:  Level of Assistance: Independent after set-up    Cardiac:   Cardiac Monitoring:  yes -     Access:  Current line(s): PIV     Genitourinary:        Respiratory:   O2 Device: None (Room air)    GI:  Current diet: ADULT DIET; Clear Liquid    Pain Management:   Patient states pain is manageable on current regimen: YES    Skin:  Rashel Scale Score: 22  Interventions: Wound Offloading (Prevention Methods): Turning  Pressure injury: no    Patient Safety:  Fall Score: Begrer Total Score: 20  Fall Risk Interventions  Nursing Judgement-Fall Risk High(Add Comments): No  Toilet Every 2 Hours-In Advance of Need: Yes  Hourly Visual Checks: Awake, In bed  Fall Visual Posted: Socks  Room Door Open: Deferred to promote rest  Alarm On: Bed  Patient Moved Closer to Nursing Station: No    Active Consults:  None    Length of Stay:  Expected LOS: 3  Actual LOS: 2      PERI CRUZ RN   
relief after 1 dose, call 911.    rosuvastatin (CRESTOR) 20 mg, Oral, Nightly    sacubitril-valsartan (ENTRESTO) 49-51 MG per tablet 1 tablet, Oral, 2 TIMES DAILY    vitamin C (ASCORBIC ACID) 500 mg, Oral, DAILY    zinc gluconate 50 mg, Oral, DAILY       Thank you,  Serena HinesWyandot [facility] Hospital  Medication History Pharmacy Technician

## 2025-01-20 ENCOUNTER — TELEPHONE (OUTPATIENT)
Age: 75
End: 2025-01-20

## 2025-01-20 NOTE — TELEPHONE ENCOUNTER
Lm to schedule consult from faxed referral, added to wq    NP LDA for recurrent sbo, daron armando, medicare and arun, notes in folder

## 2025-02-03 ENCOUNTER — OFFICE VISIT (OUTPATIENT)
Age: 75
End: 2025-02-03
Payer: MEDICARE

## 2025-02-03 VITALS
WEIGHT: 138.2 LBS | BODY MASS INDEX: 23.03 KG/M2 | OXYGEN SATURATION: 97 % | HEIGHT: 65 IN | SYSTOLIC BLOOD PRESSURE: 130 MMHG | TEMPERATURE: 97.9 F | HEART RATE: 60 BPM | RESPIRATION RATE: 12 BRPM | DIASTOLIC BLOOD PRESSURE: 78 MMHG

## 2025-02-03 DIAGNOSIS — K56.609 INTESTINAL OBSTRUCTION, UNSPECIFIED CAUSE, UNSPECIFIED WHETHER PARTIAL OR COMPLETE (HCC): Primary | ICD-10-CM

## 2025-02-03 PROCEDURE — 1036F TOBACCO NON-USER: CPT | Performed by: SURGERY

## 2025-02-03 PROCEDURE — 99214 OFFICE O/P EST MOD 30 MIN: CPT | Performed by: SURGERY

## 2025-02-03 PROCEDURE — 3017F COLORECTAL CA SCREEN DOC REV: CPT | Performed by: SURGERY

## 2025-02-03 PROCEDURE — 1090F PRES/ABSN URINE INCON ASSESS: CPT | Performed by: SURGERY

## 2025-02-03 PROCEDURE — 1123F ACP DISCUSS/DSCN MKR DOCD: CPT | Performed by: SURGERY

## 2025-02-03 PROCEDURE — 1159F MED LIST DOCD IN RCRD: CPT | Performed by: SURGERY

## 2025-02-03 PROCEDURE — 1160F RVW MEDS BY RX/DR IN RCRD: CPT | Performed by: SURGERY

## 2025-02-03 PROCEDURE — G8420 CALC BMI NORM PARAMETERS: HCPCS | Performed by: SURGERY

## 2025-02-03 PROCEDURE — G8399 PT W/DXA RESULTS DOCUMENT: HCPCS | Performed by: SURGERY

## 2025-02-03 PROCEDURE — G8427 DOCREV CUR MEDS BY ELIG CLIN: HCPCS | Performed by: SURGERY

## 2025-02-03 PROCEDURE — 1126F AMNT PAIN NOTED NONE PRSNT: CPT | Performed by: SURGERY

## 2025-02-03 ASSESSMENT — PATIENT HEALTH QUESTIONNAIRE - PHQ9
SUM OF ALL RESPONSES TO PHQ QUESTIONS 1-9: 0
SUM OF ALL RESPONSES TO PHQ9 QUESTIONS 1 & 2: 0
2. FEELING DOWN, DEPRESSED OR HOPELESS: NOT AT ALL
1. LITTLE INTEREST OR PLEASURE IN DOING THINGS: NOT AT ALL

## 2025-02-03 ASSESSMENT — ENCOUNTER SYMPTOMS
SHORTNESS OF BREATH: 0
EYE PAIN: 0
BLOOD IN STOOL: 0

## 2025-02-03 NOTE — PROGRESS NOTES
Identified pt with two pt identifiers (name and ). Reviewed chart in preparation for visit and have obtained necessary documentation.    Karlie Barrios is a 74 y.o. female Abdominal Pain (Seen at the request of Dr CAMILLA Baig for evaluation of possible adhesions and recurrent SBO)  .    Vitals:    25 1318   BP: 130/78   Site: Left Upper Arm   Position: Sitting   Pulse: 60   Resp: 12   Temp: 97.9 °F (36.6 °C)   TempSrc: Oral   SpO2: 97%   Weight: 62.7 kg (138 lb 3.2 oz)   Height: 1.651 m (5' 5\")          1. Have you been to the ER, urgent care clinic since your last visit?  Hospitalized since your last visit?  no     2. Have you seen or consulted any other health care providers outside of the [facility] since your last visit?  Include any pap smears or colon screening.  no

## 2025-02-03 NOTE — PROGRESS NOTES
Karlie Barrios (:  1950) is a 74 y.o. female, here for evaluation of the following chief complaint(s):  Abdominal Pain (Seen at the request of Dr CAMILLA Baig for evaluation of possible adhesions and recurrent SBO)      Assessment & Plan   ASSESSMENT/PLAN:  1. Intestinal obstruction, unspecified cause, unspecified whether partial or complete (HCC)  -     FL SMALL BOWEL FOLLOW THROUGH ONLY; Future      I agree with Dr. Baig that exploratory surgery without any current symptoms is typically not beneficial.  We did discuss however that she has had 2 episodes of a bowel obstruction 4 months apart which do appear to be similar location with CT scans.  Likely related to adhesive disease from her colectomy.  She is concerned that she could have another obstruction at inopportune time.    We discussed further evaluation options.  I suggested we do a small bowel follow-through to get a better look at this area.  If there is a persistent stricture or chronic obstruction in this area then surgical treatment could be considered.    Further management after the small bowel follow-through.    She expressed understanding of our discussion and is agreeable to plan.        Subjective   HPI:  HPI    2010 colectomy  2 episodes of SBO    Doing well since August  BMs normal    Was admitted for a bowel obstruction twice last year.  Once in April and once in August.  Both times symptoms resolved with nonoperative management.    Reviewed CT scan and independently interpreted the images.  She had a CT scan associated with bowel obstruction symptoms and 2024 and 2024.  There appears to be a similar transition zone just caudal to and left of the umbilicus.  This is deep near the root of the mesentery.  No associated mass.    History of cardiomyopathy with pacemaker  Followed by Dr. Gonzales.  She was cleared in April for possible surgery.     History of CKD stage II/III.  Followed by Dr. Larry        Review of Systems

## 2025-02-14 ENCOUNTER — HOSPITAL ENCOUNTER (OUTPATIENT)
Facility: HOSPITAL | Age: 75
Discharge: HOME | End: 2025-02-17
Attending: SURGERY
Payer: MEDICARE

## 2025-02-14 DIAGNOSIS — K56.609 INTESTINAL OBSTRUCTION, UNSPECIFIED CAUSE, UNSPECIFIED WHETHER PARTIAL OR COMPLETE (HCC): ICD-10-CM

## 2025-02-14 PROCEDURE — 74250 X-RAY XM SM INT 1CNTRST STD: CPT

## 2025-02-17 ENCOUNTER — TELEPHONE (OUTPATIENT)
Age: 75
End: 2025-02-17

## 2025-02-17 NOTE — TELEPHONE ENCOUNTER
Reviewed small bowel follow-through.  No transition point.    Tried calling to discuss  Hope she is doing well.  No further intervention unless she was to redevelop intermittent symptoms in which case we can consider CT enterography and/or another small bowel follow-through.    Left message.

## 2025-02-17 NOTE — TELEPHONE ENCOUNTER
Spoke with patient and let her know per Dr Hollis:  He reviewed her small bowel follow-through and he tried call her to discuss. He hopes she is doing well.  He said there is no further intervention unless she was to redevelop intermittent symptoms in which case we can consider CT enterography and/or another small bowel follow-through. She said she understood and would call if she had any concerns

## (undated) DEVICE — SUTURE VCRL 2-0 L27IN ABSRB UD PS-2 L19MM 1/2 CIR J428H

## (undated) DEVICE — SYRINGE ANGIO 10 CC BRL STD PRNT POLYCARB LT BLU MEDALLION

## (undated) DEVICE — CATH 5F 110CM PG145 -- DXTERITY

## (undated) DEVICE — CATH BLLN DIL 2.0X12MM RX -- EUPHORA

## (undated) DEVICE — SOLIDIFIER FLD 2OZ 1500CC N DISINF IN BTL DISP SAFESORB

## (undated) DEVICE — Z DISCONTINUED PER MEDLINE LINE GAS SAMPLING O2/CO2 LNG AD 13 FT NSL W/ TBNG FILTERLINE

## (undated) DEVICE — CATH IV AUTOGRD BC PNK 20GA 25 -- INSYTE

## (undated) DEVICE — NEONATAL-ADULT SPO2 SENSOR: Brand: NELLCOR

## (undated) DEVICE — RUNTHROUGH NS EXTRA FLOPPY PTCA GUIDEWIRE: Brand: RUNTHROUGH

## (undated) DEVICE — Device

## (undated) DEVICE — NEEDLE HYPO 18GA L1.5IN PNK S STL HUB POLYPR SHLD REG BVL

## (undated) DEVICE — DEVICE COMPR REG 24 CM VASC BND

## (undated) DEVICE — CATH GUID COR EB35 6FR 100CM -- LAUNCHER

## (undated) DEVICE — INTRO PEELWY HEMVLV 7F 13CM -- SHRT PRELUDE SNAP

## (undated) DEVICE — SUTURE PERMAHAND SZ 2-0 L18IN NONABSORBABLE BLK L26MM PS 1588H

## (undated) DEVICE — REM POLYHESIVE ADULT PATIENT RETURN ELECTRODE: Brand: VALLEYLAB

## (undated) DEVICE — CATH ANGI BLLN DIL 2.75X08MM -- NC EUPHORA

## (undated) DEVICE — GLIDESHEATH SLENDER ACCESS KIT: Brand: GLIDESHEATH SLENDER

## (undated) DEVICE — TOWEL 4 PLY TISS 19X30 SUE WHT

## (undated) DEVICE — PACK PROCEDURE SURG HRT CATH

## (undated) DEVICE — RADIFOCUS GLIDEWIRE: Brand: GLIDEWIRE

## (undated) DEVICE — MEDI-TRACE CADENCE ADULT, DEFIBRILLATION ELECTRODE -RTS  (10 PR/PK) - PHYSIO-CONTROL: Brand: MEDI-TRACE CADENCE

## (undated) DEVICE — COPILOT BLEEDBACK CONTROL VALVE: Brand: COPILOT

## (undated) DEVICE — 1200 GUARD II KIT W/5MM TUBE W/O VAC TUBE: Brand: GUARDIAN

## (undated) DEVICE — SUTURE COAT VCRL SZ 4-0 L18IN ABSRB UD L19MM PS-2 1/2 CIR J496G

## (undated) DEVICE — TUBING PRSS MON L6IN PVC M FEM CONN

## (undated) DEVICE — BASIN EMSIS 16OZ GRAPHITE PLAS KID SHP MOLD GRAD FOR ORAL

## (undated) DEVICE — INTRODUCER SHTH L13CM OD95FR SHT GRY HUB SEAMLESS SFSHTH

## (undated) DEVICE — RADIFOCUS OPTITORQUE ANGIOGRAPHIC CATHETER: Brand: OPTITORQUE

## (undated) DEVICE — SYR 10ML LUER LOK 1/5ML GRAD --

## (undated) DEVICE — 3M™ IOBAN™ 2 ANTIMICROBIAL INCISE DRAPE 6650EZ: Brand: IOBAN™ 2

## (undated) DEVICE — SUT SLK 0 30IN SH BLK --

## (undated) DEVICE — PACEMAKER SETUP: Brand: MEDLINE INDUSTRIES, INC.

## (undated) DEVICE — CATHETER DIAG 6FR L110CM INTRO 6FR BLLN DIA9MM 1CC PULM ART

## (undated) DEVICE — ADHESIVE SKIN CLOSURE HI VISC MIC 0.5 CC PREMIERPRO EXOFIN

## (undated) DEVICE — KIT ACCS INTRO 4FR L10CM NDL 21GA L7CM GWIRE L40CM

## (undated) DEVICE — TRAY,IRRIGATION,PISTON SYRINGE,60ML,STRL: Brand: MEDLINE

## (undated) DEVICE — SET ADMIN 16ML TBNG L100IN 2 Y INJ SITE IV PIGGY BK DISP

## (undated) DEVICE — GUIDEWIRE VASC L260CM 0.035IN J TIP L3MM PTFE FIX COR NAMIC

## (undated) DEVICE — SPLINT WR POS F/ARTERIAL ACC -- BX/10

## (undated) DEVICE — SYR 3ML LL TIP 1/10ML GRAD --

## (undated) DEVICE — 3M™ TEGADERM™ TRANSPARENT FILM DRESSING FRAME STYLE, 1626W, 4 IN X 4-3/4 IN (10 CM X 12 CM), 50/CT 4CT/CASE: Brand: 3M™ TEGADERM™

## (undated) DEVICE — GDWIRE WHISPER HITORQ EDS CSJ -- ACUITY SOLD BY BX ONLY 4648

## (undated) DEVICE — PINNACLE INTRODUCER SHEATH: Brand: PINNACLE

## (undated) DEVICE — ELECTRODE,RADIOTRANSLUCENT,FOAM,5PK: Brand: MEDLINE